# Patient Record
Sex: FEMALE | Race: BLACK OR AFRICAN AMERICAN | NOT HISPANIC OR LATINO | Employment: FULL TIME | URBAN - METROPOLITAN AREA
[De-identification: names, ages, dates, MRNs, and addresses within clinical notes are randomized per-mention and may not be internally consistent; named-entity substitution may affect disease eponyms.]

---

## 2017-01-06 DIAGNOSIS — Z11.59 SPECIAL SCREENING EXAMINATION FOR UNSPECIFIED VIRAL DISEASE: ICD-10-CM

## 2017-01-06 DIAGNOSIS — Z11.3 SCREENING EXAMINATION FOR VENEREAL DISEASE: ICD-10-CM

## 2017-01-06 DIAGNOSIS — Z11.9 SCREENING EXAMINATION FOR INFECTIOUS DISEASE: ICD-10-CM

## 2017-01-06 DIAGNOSIS — Z11.8 SPECIAL SCREENING EXAMINATION FOR UNSPECIFIED CHLAMYDIAL DISEASE: Primary | ICD-10-CM

## 2017-01-06 DIAGNOSIS — Z31.49 OTHER INVESTIGATION AND TESTING FOR PROCREATIVE MANAGEMENT: ICD-10-CM

## 2017-03-11 ENCOUNTER — OFFICE VISIT (OUTPATIENT)
Dept: INTERNAL MEDICINE | Facility: CLINIC | Age: 42
End: 2017-03-11
Payer: COMMERCIAL

## 2017-03-11 ENCOUNTER — TELEPHONE (OUTPATIENT)
Dept: INTERNAL MEDICINE | Facility: CLINIC | Age: 42
End: 2017-03-11

## 2017-03-11 VITALS
HEART RATE: 60 BPM | HEIGHT: 68 IN | SYSTOLIC BLOOD PRESSURE: 110 MMHG | DIASTOLIC BLOOD PRESSURE: 60 MMHG | WEIGHT: 152.75 LBS | RESPIRATION RATE: 10 BRPM | BODY MASS INDEX: 23.15 KG/M2

## 2017-03-11 DIAGNOSIS — H57.89 EYE SWELLING, RIGHT: Primary | ICD-10-CM

## 2017-03-11 PROCEDURE — 99999 PR PBB SHADOW E&M-EST. PATIENT-LVL III: CPT | Mod: PBBFAC,,, | Performed by: INTERNAL MEDICINE

## 2017-03-11 PROCEDURE — 1160F RVW MEDS BY RX/DR IN RCRD: CPT | Mod: S$GLB,,, | Performed by: INTERNAL MEDICINE

## 2017-03-11 PROCEDURE — 99214 OFFICE O/P EST MOD 30 MIN: CPT | Mod: S$GLB,,, | Performed by: INTERNAL MEDICINE

## 2017-03-11 RX ORDER — AMOXICILLIN AND CLAVULANATE POTASSIUM 875; 125 MG/1; MG/1
1 TABLET, FILM COATED ORAL 2 TIMES DAILY
Qty: 14 TABLET | Refills: 0 | Status: SHIPPED | OUTPATIENT
Start: 2017-03-11 | End: 2017-03-18

## 2017-03-11 NOTE — MR AVS SNAPSHOT
[facility]  [address]  [facility] [address]  Phone: [phone]  Fax: [phone]                  Radha Tabor   3/11/2017 1:20 PM   Office Visit    Description:  Female : 1975   Provider:  Jose Alberto Odonnell MD   Department:  [facility]           Reason for Visit     Facial Swelling           Diagnoses this Visit        Comments    Eye swelling, right    -  Primary            To Do List           Future Appointments        Provider Department Dept Phone    3/11/2017 1:20 PM Jose Alberto Odonnell MD [facility] [phone]    3/14/2017 8:20 AM Marry Christie, OD [facility] [phone]      Goals (5 Years of Data)     None       These Medications        Disp Refills Start End    amoxicillin-clavulanate 875-125mg (AUGMENTIN) 875-125 mg per tablet 14 tablet 0 3/11/2017 3/18/2017    Take 1 tablet by mouth 2 (two) times daily. - Oral    [facility] [address] [place] [address] AT [facility] Ph #: [phone]         [facility] On Call     [facility] On Call Nurse Care Line -  Assistance  Registered nurses in the [facility] provide clinical advisement, health education, appointment booking, and other advisory services.  Call for this free service at [phone].             Medications           Message regarding Medications     Verify the changes and/or additions to your medication regime listed below are the same as discussed with your clinician today.  If any of these changes or additions are incorrect, please notify your healthcare provider.        START taking these NEW medications        Refills    amoxicillin-clavulanate 875-125mg (AUGMENTIN) 875-125 mg per tablet 0    Sig: Take 1 tablet by mouth 2 (two) times daily.    Class: Normal    Route: Oral           Verify that the below list of medications is an accurate representation of the medications you are  "currently taking.  If none reported, the list may be blank. If incorrect, please contact your healthcare provider. Carry this list with you in case of emergency.           Current Medications     albuterol 90 mcg/actuation inhaler Inhale 1-2 puffs into the lungs every 6 (six) hours as needed for Wheezing.    amoxicillin-clavulanate 875-125mg (AUGMENTIN) 875-125 mg per tablet Take 1 tablet by mouth 2 (two) times daily.    fluticasone (FLONASE) 50 mcg/actuation nasal spray 2 sprays by Each Nare route once daily.    meloxicam (MOBIC) 15 MG tablet Take 1 tablet (15 mg total) by mouth once daily.           Clinical Reference Information           Your Vitals Were     BP Pulse Resp Height Weight Last Period    110/60 (BP Location: Left arm, Patient Position: Sitting, BP Method: Manual) 60 10 5' 8" (1.727 m) 69.3 kg (152 lb 12.5 oz) 03/07/2017    BMI                23.23 kg/m2          Blood Pressure          Most Recent Value    BP  110/60      Allergies as of 3/11/2017     No Known Allergies      Immunizations Administered on Date of Encounter - 3/11/2017     None      Orders Placed During Today's Visit      Normal Orders This Visit    Ambulatory consult to Optometry       Language Assistance Services     ATTENTION: Language assistance services are available, free of charge. Please call [phone].      ATENCIÓN: Si habla español, tiene a garcía disposición servicios gratuitos de asistencia lingüística. Llame al [phone].     ASHLEY Ý: N?u b?n nói Ti?ng Vi?t, có các d?ch v? h? tr? ngôn ng? mi?n phí dành cho b?n. G?i s? [phone].         [facility] complies with applicable Federal civil rights laws and does not discriminate on the basis of race, color, national origin, age, disability, or sex.        "

## 2017-03-11 NOTE — PROGRESS NOTES
Subjective:       Patient ID: Radha Tabor is a 41 y.o. female.    Chief Complaint: Facial Swelling (right eye)    HPI     41-year-old female here for evaluation of right eye swelling.  She noticed this yesterday.  She had makeup and fake eye lashes on.  She has pain and itching.  This started Friday.  She was rubbing her eye.  She has no trouble seeing.  The border of her eye is itchy.  She has tenderness on the medial lower lid. The swelling is localized to her lower lid.     Review of Systems    Objective:      Physical Exam   Constitutional: She is oriented to person, place, and time. She appears well-developed and well-nourished.   HENT:   Head: Normocephalic and atraumatic.   Mouth/Throat: No oropharyngeal exudate.   Eyes: EOM are normal. Pupils are equal, round, and reactive to light. Right eye exhibits no discharge. Left eye exhibits no discharge. Right conjunctiva is not injected. Right conjunctiva has no hemorrhage. Left conjunctiva is not injected. Left conjunctiva has no hemorrhage. No scleral icterus.   Neck: Normal range of motion. Neck supple. No tracheal deviation present. No thyromegaly present.   Cardiovascular: Normal rate, regular rhythm and normal heart sounds.  Exam reveals no gallop and no friction rub.    No murmur heard.  Pulmonary/Chest: Effort normal and breath sounds normal. No respiratory distress. She has no wheezes. She has no rales. She exhibits no tenderness.   Abdominal: Soft. Bowel sounds are normal. She exhibits no distension and no mass. There is no tenderness. There is no rebound and no guarding.   Musculoskeletal: Normal range of motion. She exhibits no edema or tenderness.   Neurological: She is alert and oriented to person, place, and time.   Skin: Skin is warm and dry. No rash noted. No erythema. No pallor.   Psychiatric: She has a normal mood and affect. Her behavior is normal.   Vitals reviewed.      Assessment:       1. Eye swelling, right        Plan:       1.   Augmentin 875 mg twice a day ×7 days.  Refer to optometry.  Patient advised to go to emergency room if she develops decreasing vision.

## 2017-03-31 ENCOUNTER — TELEPHONE (OUTPATIENT)
Dept: ENDOCRINOLOGY | Facility: CLINIC | Age: 42
End: 2017-03-31

## 2017-03-31 NOTE — TELEPHONE ENCOUNTER
----- Message from Julián Lopez sent at 3/30/2017  8:58 AM CDT -----  Contact: self   Pt is calling in to get an appointment for a follow up for a thyroid condition .     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     Please contact pt for more information [phone].    Thanks

## 2017-07-26 ENCOUNTER — OFFICE VISIT (OUTPATIENT)
Dept: INTERNAL MEDICINE | Facility: CLINIC | Age: 42
End: 2017-07-26
Payer: COMMERCIAL

## 2017-07-26 VITALS
WEIGHT: 147.69 LBS | TEMPERATURE: 98 F | BODY MASS INDEX: 22.38 KG/M2 | HEIGHT: 68 IN | HEART RATE: 68 BPM | SYSTOLIC BLOOD PRESSURE: 114 MMHG | DIASTOLIC BLOOD PRESSURE: 65 MMHG

## 2017-07-26 DIAGNOSIS — R10.9 ABDOMINAL PAIN, UNSPECIFIED LOCATION: Primary | ICD-10-CM

## 2017-07-26 LAB
BILIRUB UR QL STRIP: NEGATIVE
CLARITY UR REFRACT.AUTO: CLEAR
COLOR UR AUTO: YELLOW
GLUCOSE UR QL STRIP: NEGATIVE
HGB UR QL STRIP: NEGATIVE
KETONES UR QL STRIP: NEGATIVE
LEUKOCYTE ESTERASE UR QL STRIP: NEGATIVE
NITRITE UR QL STRIP: NEGATIVE
PH UR STRIP: 7 [PH] (ref 5–8)
PROT UR QL STRIP: NEGATIVE
SP GR UR STRIP: 1.01 (ref 1–1.03)
URN SPEC COLLECT METH UR: NORMAL
UROBILINOGEN UR STRIP-ACNC: NEGATIVE EU/DL

## 2017-07-26 PROCEDURE — 81003 URINALYSIS AUTO W/O SCOPE: CPT

## 2017-07-26 PROCEDURE — 99213 OFFICE O/P EST LOW 20 MIN: CPT | Mod: S$GLB,,, | Performed by: INTERNAL MEDICINE

## 2017-07-26 PROCEDURE — 99999 PR PBB SHADOW E&M-EST. PATIENT-LVL III: CPT | Mod: PBBFAC,,, | Performed by: INTERNAL MEDICINE

## 2017-07-26 PROCEDURE — 87591 N.GONORRHOEAE DNA AMP PROB: CPT

## 2017-07-26 NOTE — PROGRESS NOTES
"Clinic Note  7/26/2017      Subjective:       Patient ID:  Radha is a 42 y.o. female being seen for an established visit.    Chief Complaint: Abdominal Pain    HPI  Pt si a 43 Yo lady with PMH of endometriosis and infertility who presents to clinic complaining of suprapubic abdominal pain that started at 11:30 am yesterday, she describes it as sharp non radiating no aggravating factors alleviated with walking, denies any fever chills , n, v, her last BM was Monday describes as "pebble like" and denies any blood or melena. Pt states that she is on infertility injection treatment and she is due for retrieval in 5 days. She states that she had some spotting fri-Sunday but has stopped since. She denies any other vaginal secretions, any dysuria, frequency or urgency . She has not tried any medication for this states that drive to clinic was comfortable also and no worsening of it.       Review of Systems   Constitutional: Negative for fever and weight loss.   Gastrointestinal: Positive for abdominal pain. Negative for constipation, diarrhea, melena, nausea and vomiting.   Genitourinary: Negative for dysuria, frequency and hematuria.   Musculoskeletal: Negative for myalgias.   Neurological: Negative for sensory change, focal weakness and headaches.       Past Medical History:   Diagnosis Date    Acute hepatitis C without mention of hepatic coma     Endometriosis, site unspecified     Viral hepatitis C 6/18/2016    Viral hepatitis C 6/18/2016       Family History   Problem Relation Age of Onset    Hypertension Mother     Heart disease Mother      murmur    Hypertension Father     Heart disease Paternal Grandmother     Breast cancer Neg Hx     Colon cancer Neg Hx     Ovarian cancer Neg Hx         reports that she has never smoked. She has never used smokeless tobacco. She reports that she does not drink alcohol or use drugs.    Medication List with Changes/Refills   Current Medications    ALBUTEROL 90 " "MCG/ACTUATION INHALER    Inhale 1-2 puffs into the lungs every 6 (six) hours as needed for Wheezing.    FLUTICASONE (FLONASE) 50 MCG/ACTUATION NASAL SPRAY    2 sprays by Each Nare route once daily.    MELOXICAM (MOBIC) 15 MG TABLET    Take 1 tablet (15 mg total) by mouth once daily.     Review of patient's allergies indicates:  No Known Allergies    Patient Active Problem List   Diagnosis    Dysmenorrhea    Endometriosis of pelvis    Breast mass    Perennial allergic rhinitis    Viral hepatitis C    Primary osteoarthritis of left knee           Objective:      /65   Pulse 68   Temp 98.3 °F (36.8 °C)   Ht 5' 8" (1.727 m)   Wt 67 kg (147 lb 11.2 oz)   BMI 22.46 kg/m²   Estimated body mass index is 22.46 kg/m² as calculated from the following:    Height as of this encounter: 5' 8" (1.727 m).    Weight as of this encounter: 67 kg (147 lb 11.2 oz).  Physical Exam   Constitutional: She is oriented to person, place, and time and well-developed, well-nourished, and in no distress. No distress.   HENT:   Head: Normocephalic and atraumatic.   Mouth/Throat: Oropharynx is clear and moist.   Eyes: Right eye exhibits no discharge. Left eye exhibits no discharge. No scleral icterus.   Neck: Neck supple. No JVD present.   Cardiovascular: Normal rate, regular rhythm, normal heart sounds and intact distal pulses.  Exam reveals no friction rub.    No murmur heard.  Pulmonary/Chest: Effort normal and breath sounds normal. No respiratory distress. She has no wheezes. She has no rales.   Abdominal: Soft. Bowel sounds are normal. She exhibits no distension. There is no rebound and no guarding.   Suprapubic tenderness, no rebound or gaurding   Musculoskeletal: She exhibits no edema, tenderness or deformity.   Neurological: She is alert and oriented to person, place, and time.   Skin: Skin is warm and dry. No rash noted. She is not diaphoretic.   Psychiatric: Mood, affect and judgment normal.         Assessment and Plan: "         Radha was seen today for abdominal pain.    Diagnoses and all orders for this visit:    Abdominal pain, unspecified location  -     URINALYSIS was negative  -     C. trachomatis/N. gonorrhoeae by AMP DNA Urine  -abdominal pain likely 2/2 cyst rupture  - we recommend a vaginal Us which pt is already scheduled for tomorrow as part of her fertility treatment  -pt educated on warning signs and indications for going to ED        No Follow-up on file.    Other Orders Placed This Visit:  Orders Placed This Encounter   Procedures    C. trachomatis/N. gonorrhoeae by AMP DNA Urine    URINALYSIS         Nu Kauffman      Case reviewed and discussed with Dr. Kelly

## 2017-07-26 NOTE — PATIENT INSTRUCTIONS
if you started to develop fever (more than 100.5 F), vomiting, palpitations    ( heart rate more than 100) and low blood pressure (less than 100/60) with worsening abdominal pain please present to ED

## 2017-07-27 ENCOUNTER — TELEPHONE (OUTPATIENT)
Dept: INTERNAL MEDICINE | Facility: CLINIC | Age: 42
End: 2017-07-27

## 2017-07-27 LAB
C TRACH DNA SPEC QL NAA+PROBE: NOT DETECTED
N GONORRHOEA DNA SPEC QL NAA+PROBE: NOT DETECTED

## 2017-07-27 NOTE — TELEPHONE ENCOUNTER
attemoted to call pt and inform her of her negative gonorrhea and chlamydia test result. Unable to reach pt

## 2017-11-13 ENCOUNTER — OFFICE VISIT (OUTPATIENT)
Dept: INTERNAL MEDICINE | Facility: CLINIC | Age: 42
End: 2017-11-13
Payer: COMMERCIAL

## 2017-11-13 VITALS
HEART RATE: 76 BPM | WEIGHT: 152.13 LBS | DIASTOLIC BLOOD PRESSURE: 54 MMHG | BODY MASS INDEX: 23.05 KG/M2 | TEMPERATURE: 99 F | OXYGEN SATURATION: 99 % | HEIGHT: 68 IN | SYSTOLIC BLOOD PRESSURE: 92 MMHG

## 2017-11-13 DIAGNOSIS — H60.501 ACUTE OTITIS EXTERNA OF RIGHT EAR, UNSPECIFIED TYPE: Primary | ICD-10-CM

## 2017-11-13 DIAGNOSIS — J30.89 PERENNIAL ALLERGIC RHINITIS: ICD-10-CM

## 2017-11-13 PROCEDURE — 99214 OFFICE O/P EST MOD 30 MIN: CPT | Mod: S$GLB,,, | Performed by: NURSE PRACTITIONER

## 2017-11-13 PROCEDURE — 99999 PR PBB SHADOW E&M-EST. PATIENT-LVL III: CPT | Mod: PBBFAC,,, | Performed by: NURSE PRACTITIONER

## 2017-11-13 RX ORDER — OFLOXACIN 3 MG/ML
5 SOLUTION AURICULAR (OTIC) DAILY
Qty: 5 ML | Refills: 0 | Status: SHIPPED | OUTPATIENT
Start: 2017-11-13 | End: 2021-09-15

## 2017-11-13 RX ORDER — FLUTICASONE PROPIONATE 50 MCG
2 SPRAY, SUSPENSION (ML) NASAL DAILY
Qty: 16 G | Refills: 6 | Status: SHIPPED | OUTPATIENT
Start: 2017-11-13 | End: 2019-09-04

## 2017-11-13 NOTE — PROGRESS NOTES
"Subjective:       Patient ID: Radha Tabor is a 42 y.o. female.    Chief Complaint: Otalgia    Pt here c/o right ear pain started Tue or Wed.  Pt states that she always has a runny noise  Only using her Flonase sporadically        Otalgia    Associated symptoms include rhinorrhea. Pertinent negatives include no abdominal pain, coughing, diarrhea, headaches, neck pain, rash, sore throat or vomiting.     Past Medical History:   Diagnosis Date    Acute hepatitis C without mention of hepatic coma(070.51)     Endometriosis, site unspecified     Viral hepatitis C 6/18/2016    Viral hepatitis C 6/18/2016     Past Surgical History:   Procedure Laterality Date    Dx scope  10/16/2012    TDP hysteroscope    HYSTEROSCOPY      endometriosis    LASER LAPAROSCOPY      NASAL SEPTUM SURGERY       Social History     Social History Narrative     at [place], , 2 step kids.     Family History   Problem Relation Age of Onset    Hypertension Mother     Heart disease Mother      murmur    Hypertension Father     Heart disease Paternal Grandmother     Breast cancer Neg Hx     Colon cancer Neg Hx     Ovarian cancer Neg Hx      Vitals:    11/13/17 1502   BP: (!) 92/54   Pulse: 76   Temp: 98.8 °F (37.1 °C)   SpO2: 99%   Weight: 69 kg (152 lb 1.9 oz)   Height: 5' 8" (1.727 m)   PainSc:   4   PainLoc: Ear     Outpatient Encounter Prescriptions as of 11/13/2017   Medication Sig Dispense Refill    fluticasone (FLONASE) 50 mcg/actuation nasal spray 2 sprays by Each Nare route once daily. 16 g 6    meloxicam (MOBIC) 15 MG tablet Take 1 tablet (15 mg total) by mouth once daily. 30 tablet 2    [DISCONTINUED] fluticasone (FLONASE) 50 mcg/actuation nasal spray 2 sprays by Each Nare route once daily. 16 g 6    albuterol 90 mcg/actuation inhaler Inhale 1-2 puffs into the lungs every 6 (six) hours as needed for Wheezing. 1 Inhaler 0    ofloxacin (FLOXIN) 0.3 % otic solution Place 5 drops into the left ear once " "daily. 5 mL 0     No facility-administered encounter medications on file as of 11/13/2017.      Wt Readings from Last 3 Encounters:   11/13/17 69 kg (152 lb 1.9 oz)   07/26/17 67 kg (147 lb 11.2 oz)   03/11/17 69.3 kg (152 lb 12.5 oz)     Last 3 sets of Vitals    Vitals - 1 value per visit 3/11/2017 7/26/2017 11/13/2017   SYSTOLIC 110 114 92   DIASTOLIC 60 65 54   PULSE 60 68 76   TEMPERATURE - 98.3 98.8   RESPIRATIONS 10 - -   SPO2 - - 99   Weight (lb) 152.78 147.7 152.12   Weight (kg) 69.3 66.996 69   HEIGHT 5' 8" 5' 8" 5' 8"   BODY MASS INDEX 23.23 22.46 23.13   VISIT REPORT - - -   Pain Score  0 5 4   Some recent data might be hidden     No results found for: CBC  Review of Systems   Constitutional: Negative for chills and fever.   HENT: Positive for ear pain, postnasal drip and rhinorrhea. Negative for congestion, sore throat, tinnitus, trouble swallowing and voice change.    Eyes: Negative for discharge.   Respiratory: Negative for cough and shortness of breath.    Cardiovascular: Negative for chest pain and palpitations.   Gastrointestinal: Negative for abdominal pain, diarrhea, nausea and vomiting.   Musculoskeletal: Negative for arthralgias, myalgias, neck pain and neck stiffness.   Skin: Negative for rash.   Neurological: Negative for dizziness, facial asymmetry and headaches.   Hematological: Positive for adenopathy.   Psychiatric/Behavioral: Negative for sleep disturbance.       Objective:      Physical Exam   Constitutional: She is oriented to person, place, and time. She appears well-developed and well-nourished. No distress.   HENT:   Head: Normocephalic and atraumatic.   Right Ear: Hearing, tympanic membrane and external ear normal. There is drainage and tenderness. Tympanic membrane is not perforated.   Left Ear: Hearing, tympanic membrane, external ear and ear canal normal.   Nose: Mucosal edema and rhinorrhea present.   Mouth/Throat: Uvula is midline, oropharynx is clear and moist and mucous " membranes are normal.   Cloudy PND noted     Eyes: Conjunctivae are normal. Pupils are equal, round, and reactive to light. Right eye exhibits no discharge. Left eye exhibits no discharge.   Neck: Normal range of motion.   Cardiovascular: Normal rate, regular rhythm, normal heart sounds and intact distal pulses.    Pulmonary/Chest: Effort normal and breath sounds normal. No respiratory distress.   Abdominal: Soft.   Lymphadenopathy:        Head (right side): Submandibular adenopathy present.     She has cervical adenopathy.   Node x 1     Neurological: She is alert and oriented to person, place, and time.   Skin: Skin is warm and dry. Capillary refill takes less than 2 seconds. No rash noted. She is not diaphoretic. No erythema.   Psychiatric: She has a normal mood and affect. Her behavior is normal. Judgment and thought content normal.       Assessment:       1. Acute otitis externa of right ear, unspecified type    2. Perennial allergic rhinitis        Plan:       Recommended being consistent with Flonase  OTC Claritin or similar  Discussed keeping water out of ear  Radha was seen today for otalgia.    Diagnoses and all orders for this visit:    Acute otitis externa of right ear, unspecified type  -     ofloxacin (FLOXIN) 0.3 % otic solution; Place 5 drops into the left ear once daily.    Perennial allergic rhinitis  -     fluticasone (FLONASE) 50 mcg/actuation nasal spray; 2 sprays by Each Nare route once daily.      Patient Instructions   Ear drops once a day    Flonase twice a day for 7 days then once a day for 6 weeks    Take OTC Claritin, Zyrtec or Allegra daily.    No water in ears for 10 days

## 2017-11-13 NOTE — PATIENT INSTRUCTIONS
Ear drops once a day    Flonase twice a day for 7 days then once a day for 6 weeks    Take OTC Claritin, Zyrtec or Allegra daily.    No water in ears for 10 days

## 2018-11-25 ENCOUNTER — OFFICE VISIT (OUTPATIENT)
Dept: URGENT CARE | Facility: CLINIC | Age: 43
End: 2018-11-25
Payer: COMMERCIAL

## 2018-11-25 VITALS
BODY MASS INDEX: 22.73 KG/M2 | TEMPERATURE: 98 F | OXYGEN SATURATION: 98 % | DIASTOLIC BLOOD PRESSURE: 67 MMHG | HEIGHT: 68 IN | SYSTOLIC BLOOD PRESSURE: 122 MMHG | HEART RATE: 72 BPM | WEIGHT: 150 LBS | RESPIRATION RATE: 18 BRPM

## 2018-11-25 DIAGNOSIS — L30.9 DERMATITIS: Primary | ICD-10-CM

## 2018-11-25 PROCEDURE — 99214 OFFICE O/P EST MOD 30 MIN: CPT | Mod: S$GLB,,, | Performed by: PHYSICIAN ASSISTANT

## 2018-11-25 PROCEDURE — 3008F BODY MASS INDEX DOCD: CPT | Mod: CPTII,S$GLB,, | Performed by: PHYSICIAN ASSISTANT

## 2018-11-25 RX ORDER — METHYLPREDNISOLONE 4 MG/1
4 TABLET ORAL SEE ADMIN INSTRUCTIONS
Qty: 1 PACKAGE | Refills: 0 | Status: SHIPPED | OUTPATIENT
Start: 2018-11-25 | End: 2018-12-01

## 2018-11-25 RX ORDER — MUPIROCIN 20 MG/G
OINTMENT TOPICAL
Qty: 22 G | Refills: 1 | Status: SHIPPED | OUTPATIENT
Start: 2018-11-25 | End: 2021-09-15

## 2018-11-25 NOTE — PATIENT INSTRUCTIONS
Nonspecific Dermatitis  Dermatitis is a skin rash caused by something that touches the skin and makes it irritated and inflamed.  Your skin may be red, swollen, dry, and may be cracked. Blisters may form and ooze. The rash will itch.  Dermatitis can form on the face and neck, backs of hands, forearms, genitals, and lower legs. Dermatitis is not passed from person to person.  Talk with your health care provider about what may have caused the rash. Common things that cause skin allergies are metal in jewelry, plants like poison ivy or poison oak, and certain skin care products. You will need to avoid the source of your rash in the future to prevent it from coming back. In some cases, the cause of the dermatitis may not be found.  Treatment is done to relieve itching and prevent the rash from coming back. The rash should go away in a few days to a few weeks.  Home care  The health care provider may prescribe medications to relieve swelling and itching. Follow all instructions when using these medications.  · Avoid anything that heats up your skin, such as hot showers or baths, or direct sunlight. This can make itching worse.  · Stay away from whatever you think caused the rash.  · Bathe in warm, not hot, water. Apply a moisturizing lotion after bathing to prevent dry skin.  · Avoid skin irritants such as wool or silk clothing, grease, oils, harsh soaps, and detergents.  · Apply cold compresses to soothe your sores to help relieve your symptoms. Do this for 30 minutes 3 to 4 times a day. You can make a cold compress by soaking a cloth in cold water. Squeeze out excess water. You can add colloidal oatmeal to the water to help reduce itching. For severe itching in a small area, apply an ice pack wrapped in a thin towel. Do this for 20 minutes 3 to 4 times a day.  · You can also help relieve large areas of itching by taking a lukewarm bath with colloidal oatmeal added to the water.  · Use hydrocortisone cream for redness  and irritation, unless another medicine was prescribed. You can also use benzocaine anesthetic cream or spray.  · Use oral diphenhydramine to help reduce itching. This is an antihistamine you can buy at drug and grocery stores. It can make you sleepy, so use lower doses during the daytime. Or you can use loratadine. This is an antihistamine that will not make you sleepy. Dont use diphenhydramine if you have glaucoma or have trouble urinating because of an enlarged prostate.  · Wash your hands or use an antibacterial gel often to prevent the spread of the rash.  Follow-up care  Follow up with your health care provider. Make an appointment with your health care provider if your symptoms do not get better in the next 1 to 2 weeks.  When to seek medical advice  Call your health care provider right away if any of these occur:  · Spreading of the rash to other parts of your body  · Severe swelling of your face, eyelids, mouth, throat or tongue  · Trouble urinating due to swelling in the genital area  · Fever of 100.4°F (38°C) or higher  · Redness or swelling that gets worse  · Pain that gets worse  · Foul-smelling fluid leaking from the skin  · Yellow-brown crusts on the open blisters  · Joint pain   Date Last Reviewed: 7/23/2014  © 3964-3819 [employer]. [address] [place] [address], [place] [address]. All rights reserved. This information is not intended as a substitute for professional medical care. Always follow your healthcare professional's instructions.      Please follow up with your Primary care provider within 2-5 days if your signs and symptoms have not resolved or worsen.     If your condition worsens or fails to improve we recommend that you receive another evaluation at the emergency room immediately or contact your primary medical clinic to discuss your concerns.   You must understand that you have received an Urgent Care treatment only and that you may be released before all of your medical  problems are known or treated. You, the patient, will arrange for follow up care as instructed.     RED FLAGS/WARNING SYMPTOMS DISCUSSED WITH PATIENT THAT WOULD WARRANT EMERGENT MEDICAL ATTENTION. PATIENT VERBALIZED UNDERSTANDING.        No

## 2018-11-25 NOTE — PROGRESS NOTES
"Subjective:       Patient ID: Radha Tabor is a 43 y.o. female.    Vitals:  height is 5' 8" (1.727 m) and weight is 68 kg (150 lb). Her oral temperature is 98.4 °F (36.9 °C). Her blood pressure is 122/67 and her pulse is 72. Her respiration is 18 and oxygen saturation is 98%.     Chief Complaint: Rash    Pt states she has bumps on the back of her leg since friday. Pt states symptoms have worsened. Pt reports itching and swelling. Pt is unsure the source of the bumps.       Rash   This is a new problem. The current episode started in the past 7 days. The problem has been gradually worsening since onset. The affected locations include the left upper leg. The rash is characterized by redness, swelling and itchiness. It is unknown if there was an exposure to a precipitant. Pertinent negatives include no congestion, cough, diarrhea, fatigue, fever, shortness of breath, sore throat or vomiting.       Constitution: Negative for chills, fatigue and fever.   HENT: Negative for congestion and sore throat.    Neck: Negative for painful lymph nodes.   Cardiovascular: Negative for chest pain and leg swelling.   Eyes: Negative for double vision and blurred vision.   Respiratory: Negative for cough and shortness of breath.    Gastrointestinal: Negative for nausea, vomiting and diarrhea.   Genitourinary: Negative for dysuria, frequency, urgency and history of kidney stones.   Musculoskeletal: Negative for joint pain, joint swelling, muscle cramps and muscle ache.   Skin: Positive for rash. Negative for color change, pale, erythema and bruising.   Allergic/Immunologic: Positive for itching. Negative for seasonal allergies.   Neurological: Negative for dizziness, history of vertigo, light-headedness, passing out and headaches.   Hematologic/Lymphatic: Negative for swollen lymph nodes.   Psychiatric/Behavioral: Negative for nervous/anxious, sleep disturbance and depression. The patient is not nervous/anxious.        Objective:    "   Physical Exam   Constitutional: She is oriented to person, place, and time. She appears well-developed and well-nourished.   HENT:   Head: Normocephalic and atraumatic. Head is without abrasion, without contusion and without laceration.   Right Ear: External ear normal.   Left Ear: External ear normal.   Nose: Nose normal.   Mouth/Throat: Oropharynx is clear and moist.   Eyes: Conjunctivae, EOM and lids are normal. Pupils are equal, round, and reactive to light.   Neck: Trachea normal, full passive range of motion without pain and phonation normal. Neck supple.   Cardiovascular: Normal rate, regular rhythm and normal heart sounds.   Pulmonary/Chest: Effort normal and breath sounds normal. No stridor. No respiratory distress.   Musculoskeletal: Normal range of motion.   Neurological: She is alert and oriented to person, place, and time.   Skin: Skin is warm, dry and intact. Capillary refill takes less than 2 seconds. Rash noted. No abrasion, no bruising, no burn, no ecchymosis, no laceration, no lesion and no purpura noted. Rash is pustular. Rash is not nodular and not urticarial. No erythema.        Psychiatric: She has a normal mood and affect. Her speech is normal and behavior is normal. Judgment and thought content normal. Cognition and memory are normal.   Nursing note and vitals reviewed.      Assessment:       1. Dermatitis        Plan:         Dermatitis  -     methylPREDNISolone (MEDROL DOSEPACK) 4 mg tablet; Take 1 tablet (4 mg total) by mouth As instructed (Take as directed). Take as directed  Dispense: 1 Package; Refill: 0  -     mupirocin (BACTROBAN) 2 % ointment; Apply to affected area 3 times daily  Dispense: 22 g; Refill: 1          Nonspecific Dermatitis  Dermatitis is a skin rash caused by something that touches the skin and makes it irritated and inflamed.  Your skin may be red, swollen, dry, and may be cracked. Blisters may form and ooze. The rash will itch.  Dermatitis can form on the face and  neck, backs of hands, forearms, genitals, and lower legs. Dermatitis is not passed from person to person.  Talk with your health care provider about what may have caused the rash. Common things that cause skin allergies are metal in jewelry, plants like poison ivy or poison oak, and certain skin care products. You will need to avoid the source of your rash in the future to prevent it from coming back. In some cases, the cause of the dermatitis may not be found.  Treatment is done to relieve itching and prevent the rash from coming back. The rash should go away in a few days to a few weeks.  Home care  The health care provider may prescribe medications to relieve swelling and itching. Follow all instructions when using these medications.  · Avoid anything that heats up your skin, such as hot showers or baths, or direct sunlight. This can make itching worse.  · Stay away from whatever you think caused the rash.  · Bathe in warm, not hot, water. Apply a moisturizing lotion after bathing to prevent dry skin.  · Avoid skin irritants such as wool or silk clothing, grease, oils, harsh soaps, and detergents.  · Apply cold compresses to soothe your sores to help relieve your symptoms. Do this for 30 minutes 3 to 4 times a day. You can make a cold compress by soaking a cloth in cold water. Squeeze out excess water. You can add colloidal oatmeal to the water to help reduce itching. For severe itching in a small area, apply an ice pack wrapped in a thin towel. Do this for 20 minutes 3 to 4 times a day.  · You can also help relieve large areas of itching by taking a lukewarm bath with colloidal oatmeal added to the water.  · Use hydrocortisone cream for redness and irritation, unless another medicine was prescribed. You can also use benzocaine anesthetic cream or spray.  · Use oral diphenhydramine to help reduce itching. This is an antihistamine you can buy at drug and grocery stores. It can make you sleepy, so use lower doses  during the daytime. Or you can use loratadine. This is an antihistamine that will not make you sleepy. Dont use diphenhydramine if you have glaucoma or have trouble urinating because of an enlarged prostate.  · Wash your hands or use an antibacterial gel often to prevent the spread of the rash.  Follow-up care  Follow up with your health care provider. Make an appointment with your health care provider if your symptoms do not get better in the next 1 to 2 weeks.  When to seek medical advice  Call your health care provider right away if any of these occur:  · Spreading of the rash to other parts of your body  · Severe swelling of your face, eyelids, mouth, throat or tongue  · Trouble urinating due to swelling in the genital area  · Fever of 100.4°F (38°C) or higher  · Redness or swelling that gets worse  · Pain that gets worse  · Foul-smelling fluid leaking from the skin  · Yellow-brown crusts on the open blisters  · Joint pain   Date Last Reviewed: 7/23/2014 © 2000-2017 [employer]. [address] [place] [address]. All rights reserved. This information is not intended as a substitute for professional medical care. Always follow your healthcare professional's instructions.      Please follow up with your Primary care provider within 2-5 days if your signs and symptoms have not resolved or worsen.     If your condition worsens or fails to improve we recommend that you receive another evaluation at the emergency room immediately or contact your primary medical clinic to discuss your concerns.   You must understand that you have received an Urgent Care treatment only and that you may be released before all of your medical problems are known or treated. You, the patient, will arrange for follow up care as instructed.     RED FLAGS/WARNING SYMPTOMS DISCUSSED WITH PATIENT THAT WOULD WARRANT EMERGENT MEDICAL ATTENTION. PATIENT VERBALIZED UNDERSTANDING.

## 2018-12-05 ENCOUNTER — OFFICE VISIT (OUTPATIENT)
Dept: INTERNAL MEDICINE | Facility: CLINIC | Age: 43
End: 2018-12-05
Payer: COMMERCIAL

## 2018-12-05 VITALS
HEIGHT: 68 IN | OXYGEN SATURATION: 98 % | DIASTOLIC BLOOD PRESSURE: 68 MMHG | SYSTOLIC BLOOD PRESSURE: 110 MMHG | HEART RATE: 84 BPM | TEMPERATURE: 99 F | BODY MASS INDEX: 23.45 KG/M2 | WEIGHT: 154.75 LBS

## 2018-12-05 DIAGNOSIS — J02.9 SORE THROAT: Primary | ICD-10-CM

## 2018-12-05 DIAGNOSIS — J32.9 OTHER SINUSITIS, UNSPECIFIED CHRONICITY: ICD-10-CM

## 2018-12-05 LAB
CTP QC/QA: YES
S PYO RRNA THROAT QL PROBE: NEGATIVE

## 2018-12-05 PROCEDURE — 3008F BODY MASS INDEX DOCD: CPT | Mod: CPTII,S$GLB,, | Performed by: NURSE PRACTITIONER

## 2018-12-05 PROCEDURE — 99999 PR PBB SHADOW E&M-EST. PATIENT-LVL IV: CPT | Mod: PBBFAC,,, | Performed by: NURSE PRACTITIONER

## 2018-12-05 PROCEDURE — 96372 THER/PROPH/DIAG INJ SC/IM: CPT | Mod: 59,S$GLB,, | Performed by: NURSE PRACTITIONER

## 2018-12-05 PROCEDURE — 99214 OFFICE O/P EST MOD 30 MIN: CPT | Mod: 25,S$GLB,, | Performed by: NURSE PRACTITIONER

## 2018-12-05 PROCEDURE — 87081 CULTURE SCREEN ONLY: CPT

## 2018-12-05 PROCEDURE — 87880 STREP A ASSAY W/OPTIC: CPT | Mod: QW,S$GLB,, | Performed by: NURSE PRACTITIONER

## 2018-12-05 RX ORDER — BETAMETHASONE SODIUM PHOSPHATE AND BETAMETHASONE ACETATE 3; 3 MG/ML; MG/ML
6 INJECTION, SUSPENSION INTRA-ARTICULAR; INTRALESIONAL; INTRAMUSCULAR; SOFT TISSUE
Status: COMPLETED | OUTPATIENT
Start: 2018-12-05 | End: 2018-12-05

## 2018-12-05 RX ORDER — AMOXICILLIN AND CLAVULANATE POTASSIUM 875; 125 MG/1; MG/1
1 TABLET, FILM COATED ORAL EVERY 12 HOURS
Qty: 20 TABLET | Refills: 0 | Status: SHIPPED | OUTPATIENT
Start: 2018-12-05 | End: 2018-12-15

## 2018-12-05 RX ADMIN — BETAMETHASONE SODIUM PHOSPHATE AND BETAMETHASONE ACETATE 6 MG: 3; 3 INJECTION, SUSPENSION INTRA-ARTICULAR; INTRALESIONAL; INTRAMUSCULAR; SOFT TISSUE at 03:12

## 2018-12-05 NOTE — PROGRESS NOTES
Subjective:       Patient ID: Radha Tabor is a 43 y.o. female.    Chief Complaint: Sinusitis (sore throat, earache, cough)    HPI:  42 yo female that presents to clinic today with complaint of sore throat, cough and congestion.    States that her symptoms started about one day ago.  Reports that she is having thick brown mucus production.  States that she is also having increased head pressure.  Denies any fever, chest pain, SOB, n/v or dizziness.  States that appetite and energy level are down.    States that she took a dose of theraflu this morning but states minimal relief.    Review of Systems   Constitutional: Positive for appetite change and fatigue. Negative for activity change and fever.   HENT: Positive for congestion, postnasal drip, rhinorrhea, sinus pressure, sinus pain and sore throat. Negative for ear pain.    Eyes: Negative for photophobia and visual disturbance.   Respiratory: Positive for cough. Negative for apnea, shortness of breath and wheezing.    Cardiovascular: Negative for chest pain, palpitations and leg swelling.   Gastrointestinal: Negative for abdominal distention, abdominal pain, constipation, diarrhea, nausea and vomiting.   Musculoskeletal: Negative for arthralgias, back pain, myalgias, neck pain and neck stiffness.   Skin: Negative for color change and rash.   Neurological: Negative for dizziness, light-headedness, numbness and headaches.   Psychiatric/Behavioral: Negative for behavioral problems.       Objective:      Physical Exam   Constitutional: She is oriented to person, place, and time. She appears well-developed and well-nourished. She appears distressed.   HENT:   Head: Normocephalic and atraumatic.   Right Ear: External ear normal.   Left Ear: External ear normal.   Nose: Rhinorrhea present. Right sinus exhibits maxillary sinus tenderness and frontal sinus tenderness. Left sinus exhibits maxillary sinus tenderness and frontal sinus tenderness.   + post nasal drip and  moderate erythema to oropharynx.   Eyes: Conjunctivae and EOM are normal. Pupils are equal, round, and reactive to light.   Neck: Normal range of motion. Neck supple. No thyromegaly present.   Cardiovascular: Normal rate, regular rhythm, normal heart sounds and intact distal pulses.   No murmur heard.  Pulmonary/Chest: Effort normal and breath sounds normal. No stridor. No respiratory distress. She has no wheezes. She has no rales.   Abdominal: Soft. Bowel sounds are normal. She exhibits no distension and no mass. There is no tenderness.   Lymphadenopathy:     She has no cervical adenopathy.   Neurological: She is alert and oriented to person, place, and time. No cranial nerve deficit.   Skin: Skin is warm and dry. No erythema.   Psychiatric: Her behavior is normal.       Assessment:       1. Sore throat    2. Other sinusitis, unspecified chronicity        Plan:     1.  Sore throat  -Rapid strep screen in clinic is negative.  -Will send off for culture.  -Encouraged use of warm salt water gargles to help with sore throat relief.    2.  Sinusitis  -Given brown mucus production, will give prescription for Augmentin po bid x 10 days.  -Will give 6mg IM of betamethasone in clinic to help with symptom relief.  -Encouraged to increase water intake and get plenty of rest.  -Can take advil or tylenol for any fever, aches or pains.

## 2018-12-07 LAB — BACTERIA THROAT CULT: NORMAL

## 2019-01-20 ENCOUNTER — PATIENT MESSAGE (OUTPATIENT)
Dept: INTERNAL MEDICINE | Facility: CLINIC | Age: 44
End: 2019-01-20

## 2019-01-20 DIAGNOSIS — Z12.31 ENCOUNTER FOR SCREENING MAMMOGRAM FOR MALIGNANT NEOPLASM OF BREAST: Primary | ICD-10-CM

## 2019-01-21 ENCOUNTER — PATIENT MESSAGE (OUTPATIENT)
Dept: INTERNAL MEDICINE | Facility: CLINIC | Age: 44
End: 2019-01-21

## 2019-01-22 NOTE — TELEPHONE ENCOUNTER
Hi, please contact the patient to assist in scheduling    Orders Placed This Encounter    Mammo Digital Screening Bilateral With CAD     And a a checkup with me.    Thank you, Landon Felix

## 2019-08-03 ENCOUNTER — HOSPITAL ENCOUNTER (OUTPATIENT)
Dept: RADIOLOGY | Facility: HOSPITAL | Age: 44
Discharge: HOME | End: 2019-08-03
Attending: INTERNAL MEDICINE
Payer: COMMERCIAL

## 2019-08-03 VITALS — HEIGHT: 68 IN | WEIGHT: 154 LBS | BODY MASS INDEX: 23.34 KG/M2

## 2019-08-03 DIAGNOSIS — Z12.31 ENCOUNTER FOR SCREENING MAMMOGRAM FOR MALIGNANT NEOPLASM OF BREAST: ICD-10-CM

## 2019-08-03 PROCEDURE — 77067 SCR MAMMO BI INCL CAD: CPT | Mod: 26,,, | Performed by: RADIOLOGY

## 2019-08-03 PROCEDURE — 77067 SCR MAMMO BI INCL CAD: CPT | Mod: TC

## 2019-08-03 PROCEDURE — 77067 MAMMO DIGITAL SCREENING BILAT WITH TOMOSYNTHESIS_CAD: ICD-10-PCS | Mod: 26,,, | Performed by: RADIOLOGY

## 2019-08-03 PROCEDURE — 77063 BREAST TOMOSYNTHESIS BI: CPT | Mod: 26,,, | Performed by: RADIOLOGY

## 2019-08-03 PROCEDURE — 77063 MAMMO DIGITAL SCREENING BILAT WITH TOMOSYNTHESIS_CAD: ICD-10-PCS | Mod: 26,,, | Performed by: RADIOLOGY

## 2019-09-04 ENCOUNTER — OFFICE VISIT (OUTPATIENT)
Dept: INTERNAL MEDICINE | Facility: CLINIC | Age: 44
End: 2019-09-04
Payer: COMMERCIAL

## 2019-09-04 VITALS
HEIGHT: 68 IN | OXYGEN SATURATION: 98 % | SYSTOLIC BLOOD PRESSURE: 116 MMHG | DIASTOLIC BLOOD PRESSURE: 60 MMHG | HEART RATE: 82 BPM | BODY MASS INDEX: 24.63 KG/M2 | WEIGHT: 162.5 LBS

## 2019-09-04 DIAGNOSIS — J30.89 PERENNIAL ALLERGIC RHINITIS: ICD-10-CM

## 2019-09-04 PROCEDURE — 99999 PR PBB SHADOW E&M-EST. PATIENT-LVL III: CPT | Mod: PBBFAC,,, | Performed by: INTERNAL MEDICINE

## 2019-09-04 PROCEDURE — 3008F BODY MASS INDEX DOCD: CPT | Mod: CPTII,S$GLB,, | Performed by: INTERNAL MEDICINE

## 2019-09-04 PROCEDURE — 3008F PR BODY MASS INDEX (BMI) DOCUMENTED: ICD-10-PCS | Mod: CPTII,S$GLB,, | Performed by: INTERNAL MEDICINE

## 2019-09-04 PROCEDURE — 99213 OFFICE O/P EST LOW 20 MIN: CPT | Mod: S$GLB,,, | Performed by: INTERNAL MEDICINE

## 2019-09-04 PROCEDURE — 99213 PR OFFICE/OUTPT VISIT, EST, LEVL III, 20-29 MIN: ICD-10-PCS | Mod: S$GLB,,, | Performed by: INTERNAL MEDICINE

## 2019-09-04 PROCEDURE — 99999 PR PBB SHADOW E&M-EST. PATIENT-LVL III: ICD-10-PCS | Mod: PBBFAC,,, | Performed by: INTERNAL MEDICINE

## 2019-09-04 RX ORDER — FLUTICASONE PROPIONATE 50 MCG
2 SPRAY, SUSPENSION (ML) NASAL DAILY
Qty: 16 G | Refills: 6 | Status: SHIPPED | OUTPATIENT
Start: 2019-09-04 | End: 2020-10-01 | Stop reason: SDUPTHER

## 2019-09-04 RX ORDER — MINERAL OIL
180 ENEMA (ML) RECTAL DAILY
Qty: 30 TABLET | Refills: 6 | Status: SHIPPED | OUTPATIENT
Start: 2019-09-04 | End: 2021-09-15

## 2019-09-04 NOTE — PROGRESS NOTES
Subjective:       Patient ID: Radha Tabor is a 44 y.o. female.    Chief Complaint: Sinusitis (thick phlegm in the back of the throat, either runny or nasal congestion (never at the same time) did take Sudafed and Benadryl, had a really bad day on labor day with the sinus infection, ears clogged/popping once a month )    Here for urgent care --- nasal congestion, sneezing and hot feeling, some hives as well. Has thick phlegm at times, which she gags on on occ. No thick phlegm today. No f/c/ns. No known sick contacts. Has had long term allergies, and thinks she has hay fever. [place] snot recall every having allergy testing. No current f/c/ns.    Review of Systems   Constitutional: Negative for activity change, fatigue and fever.   HENT: Positive for congestion, postnasal drip, rhinorrhea and sneezing. Negative for sinus pain, trouble swallowing and voice change.    Respiratory: Negative for shortness of breath.        Objective:      Physical Exam   Constitutional: She appears well-developed and well-nourished. No distress.   HENT:   Head: Normocephalic and atraumatic.   Mouth/Throat: No oropharyngeal exudate.   TMs clear (L side mildly cerumen obscures view partially), sinuses nontender, nasal mucosa w/o purulence.  OP with post nasal gtt seen   Eyes: Pupils are equal, round, and reactive to light. EOM are normal. No scleral icterus.   Neck: Normal range of motion. Neck supple. No thyromegaly present.   Cardiovascular: Normal rate and regular rhythm.   Pulmonary/Chest: Effort normal and breath sounds normal. No respiratory distress. She has no wheezes. She has no rales.   Lymphadenopathy:     She has no cervical adenopathy.   Skin: She is not diaphoretic.   Psychiatric: She has a normal mood and affect. Her behavior is normal.       Assessment:       1. Perennial allergic rhinitis        Plan:       Radha was seen today for sinusitis.    Diagnoses and all orders for this visit:    Perennial allergic rhinitis  -      fluticasone propionate (FLONASE) 50 mcg/actuation nasal spray; 2 sprays (100 mcg total) by Each Nostril route once daily.  -     fexofenadine (ALLEGRA) 180 MG tablet; Take 1 tablet (180 mg total) by mouth once daily.  Sounds like allergies more than acute bactreial sinusitits.  Explained that if not better in 1-2 weeks, pt should rtc/call PCP, or call if sputum/mucous starts appearing more purulent          Health Maintenance       Date Due Completion Date    TETANUS VACCINE 04/09/1993 ---    Pneumococcal Vaccine (Medium Risk) (1 of 1 - PPSV23) 04/09/1994 ---    Pap Smear with HPV Cotest 05/09/2019 5/9/2016    Influenza Vaccine (1) 09/01/2019 ---    Mammogram 08/03/2021 8/3/2019      rtc for annual    Follow up in about 3 weeks (around 9/25/2019).    Future Appointments   Date Time Provider Department Center   9/25/2019  3:20 PM Landon Felix MD [facility]

## 2019-09-25 ENCOUNTER — IMMUNIZATION (OUTPATIENT)
Dept: PHARMACY | Facility: CLINIC | Age: 44
End: 2019-09-25
Payer: COMMERCIAL

## 2019-09-25 ENCOUNTER — LAB VISIT (OUTPATIENT)
Dept: LAB | Facility: HOSPITAL | Age: 44
End: 2019-09-25
Attending: INTERNAL MEDICINE
Payer: COMMERCIAL

## 2019-09-25 ENCOUNTER — OFFICE VISIT (OUTPATIENT)
Dept: INTERNAL MEDICINE | Facility: CLINIC | Age: 44
End: 2019-09-25
Payer: COMMERCIAL

## 2019-09-25 VITALS
WEIGHT: 160.06 LBS | DIASTOLIC BLOOD PRESSURE: 66 MMHG | HEIGHT: 68 IN | BODY MASS INDEX: 24.26 KG/M2 | HEART RATE: 75 BPM | SYSTOLIC BLOOD PRESSURE: 114 MMHG | OXYGEN SATURATION: 98 %

## 2019-09-25 DIAGNOSIS — J30.89 PERENNIAL ALLERGIC RHINITIS: ICD-10-CM

## 2019-09-25 DIAGNOSIS — Z00.00 ROUTINE GENERAL MEDICAL EXAMINATION AT A HEALTH CARE FACILITY: Primary | ICD-10-CM

## 2019-09-25 DIAGNOSIS — Z00.00 ROUTINE GENERAL MEDICAL EXAMINATION AT A HEALTH CARE FACILITY: ICD-10-CM

## 2019-09-25 LAB
ALBUMIN SERPL BCP-MCNC: 3.9 G/DL (ref 3.5–5.2)
ALP SERPL-CCNC: 41 U/L (ref 55–135)
ALT SERPL W/O P-5'-P-CCNC: 37 U/L (ref 10–44)
ANION GAP SERPL CALC-SCNC: 8 MMOL/L (ref 8–16)
AST SERPL-CCNC: 39 U/L (ref 10–40)
BASOPHILS # BLD AUTO: 0.04 K/UL (ref 0–0.2)
BASOPHILS NFR BLD: 0.9 % (ref 0–1.9)
BILIRUB SERPL-MCNC: 0.7 MG/DL (ref 0.1–1)
BUN SERPL-MCNC: 11 MG/DL (ref 6–20)
CALCIUM SERPL-MCNC: 9.1 MG/DL (ref 8.7–10.5)
CHLORIDE SERPL-SCNC: 103 MMOL/L (ref 95–110)
CO2 SERPL-SCNC: 25 MMOL/L (ref 23–29)
CREAT SERPL-MCNC: 0.9 MG/DL (ref 0.5–1.4)
DIFFERENTIAL METHOD: ABNORMAL
EOSINOPHIL # BLD AUTO: 0.2 K/UL (ref 0–0.5)
EOSINOPHIL NFR BLD: 4.2 % (ref 0–8)
ERYTHROCYTE [DISTWIDTH] IN BLOOD BY AUTOMATED COUNT: 12.8 % (ref 11.5–14.5)
EST. GFR  (AFRICAN AMERICAN): >60 ML/MIN/1.73 M^2
EST. GFR  (NON AFRICAN AMERICAN): >60 ML/MIN/1.73 M^2
GLUCOSE SERPL-MCNC: 79 MG/DL (ref 70–110)
HCT VFR BLD AUTO: 36.3 % (ref 37–48.5)
HGB BLD-MCNC: 12.2 G/DL (ref 12–16)
LYMPHOCYTES # BLD AUTO: 1.8 K/UL (ref 1–4.8)
LYMPHOCYTES NFR BLD: 42.3 % (ref 18–48)
MCH RBC QN AUTO: 29.8 PG (ref 27–31)
MCHC RBC AUTO-ENTMCNC: 33.6 G/DL (ref 32–36)
MCV RBC AUTO: 89 FL (ref 82–98)
MONOCYTES # BLD AUTO: 0.3 K/UL (ref 0.3–1)
MONOCYTES NFR BLD: 7.7 % (ref 4–15)
NEUTROPHILS # BLD AUTO: 1.9 K/UL (ref 1.8–7.7)
NEUTROPHILS NFR BLD: 44.9 % (ref 38–73)
PLATELET # BLD AUTO: 215 K/UL (ref 150–350)
PMV BLD AUTO: 9.9 FL (ref 9.2–12.9)
POTASSIUM SERPL-SCNC: 4 MMOL/L (ref 3.5–5.1)
PROT SERPL-MCNC: 7.7 G/DL (ref 6–8.4)
RBC # BLD AUTO: 4.09 M/UL (ref 4–5.4)
SODIUM SERPL-SCNC: 136 MMOL/L (ref 136–145)
WBC # BLD AUTO: 4.28 K/UL (ref 3.9–12.7)

## 2019-09-25 PROCEDURE — 85025 COMPLETE CBC W/AUTO DIFF WBC: CPT

## 2019-09-25 PROCEDURE — 99999 PR PBB SHADOW E&M-EST. PATIENT-LVL IV: ICD-10-PCS | Mod: PBBFAC,,, | Performed by: INTERNAL MEDICINE

## 2019-09-25 PROCEDURE — 99396 PREV VISIT EST AGE 40-64: CPT | Mod: S$GLB,,, | Performed by: INTERNAL MEDICINE

## 2019-09-25 PROCEDURE — 80061 LIPID PANEL: CPT

## 2019-09-25 PROCEDURE — 99999 PR PBB SHADOW E&M-EST. PATIENT-LVL IV: CPT | Mod: PBBFAC,,, | Performed by: INTERNAL MEDICINE

## 2019-09-25 PROCEDURE — 99396 PR PREVENTIVE VISIT,EST,40-64: ICD-10-PCS | Mod: S$GLB,,, | Performed by: INTERNAL MEDICINE

## 2019-09-25 PROCEDURE — 36415 COLL VENOUS BLD VENIPUNCTURE: CPT

## 2019-09-25 PROCEDURE — 80053 COMPREHEN METABOLIC PANEL: CPT

## 2019-09-25 NOTE — PROGRESS NOTES
Subjective:       Patient ID: Radha Tabor is a 44 y.o. female.    Chief Complaint: Follow-up (3 week (patient stated she is feeling much better))    Here for annual exam    Some wt gain. Drinks sweet drinks.    Review of Systems   Constitutional: Negative for activity change and unexpected weight change.   HENT: Positive for rhinorrhea (improved with nasal spray and allegra). Negative for hearing loss and trouble swallowing.    Eyes: Negative for discharge and visual disturbance.   Respiratory: Negative for chest tightness and wheezing.    Cardiovascular: Negative for chest pain and palpitations.   Gastrointestinal: Negative for blood in stool, constipation, diarrhea and vomiting.   Endocrine: Negative for polydipsia and polyuria.   Genitourinary: Negative for difficulty urinating, dysuria, hematuria and menstrual problem.   Musculoskeletal: Negative for arthralgias, joint swelling and neck pain.   Neurological: Negative for weakness and headaches.   Psychiatric/Behavioral: Negative for confusion and dysphoric mood.       Objective:      Physical Exam   Constitutional: She is oriented to person, place, and time. She appears well-developed and well-nourished. No distress.   HENT:   Head: Normocephalic and atraumatic.   Eyes: Pupils are equal, round, and reactive to light. No scleral icterus.   Neck: Normal range of motion. No thyromegaly present.   Cardiovascular: Normal rate, regular rhythm and normal heart sounds. Exam reveals no gallop and no friction rub.   No murmur heard.  Pulmonary/Chest: Effort normal and breath sounds normal. No respiratory distress. She has no wheezes. She has no rales.   Abdominal: Soft. Bowel sounds are normal. She exhibits no distension and no mass. There is no tenderness. There is no rebound and no guarding.   Musculoskeletal: Normal range of motion. She exhibits no edema or tenderness.   Lymphadenopathy:     She has no cervical adenopathy.   Neurological: She is alert and oriented to  person, place, and time.   Skin: She is not diaphoretic.   Psychiatric: She has a normal mood and affect. Her speech is normal and behavior is normal. Cognition and memory are normal.       Assessment:       1. Routine general medical examination at a health care facility    2. Perennial allergic rhinitis        Plan:       Radha was seen today for follow-up.    Diagnoses and all orders for this visit:    Routine general medical examination at a health care facility  -     CBC auto differential; Future  -     Comprehensive metabolic panel; Future  -     Lipid panel; Future  -     Ambulatory Referral to Gynecology  Due for pap    Perennial allergic rhinitis  btr controlled now    Discussed wt gain and trying to cut back on coke/sugary drinks      Health Maintenance       Date Due Completion Date    TETANUS VACCINE 04/09/1993 ---    Pneumococcal Vaccine (Medium Risk) (1 of 1 - PPSV23) 04/09/1994 ---    Pap Smear with HPV Cotest 05/09/2019 5/9/2016    Influenza Vaccine (1) 09/01/2019 ---    Mammogram 08/03/2021 8/3/2019          Follow up in about 1 year (around 9/25/2020) for Flu vax please.    No future appointments.

## 2019-09-26 LAB
CHOLEST SERPL-MCNC: 200 MG/DL (ref 120–199)
CHOLEST/HDLC SERPL: 3.3 {RATIO} (ref 2–5)
HDLC SERPL-MCNC: 61 MG/DL (ref 40–75)
HDLC SERPL: 30.5 % (ref 20–50)
LDLC SERPL CALC-MCNC: 123.6 MG/DL (ref 63–159)
NONHDLC SERPL-MCNC: 139 MG/DL
TRIGL SERPL-MCNC: 77 MG/DL (ref 30–150)

## 2019-12-11 ENCOUNTER — OFFICE VISIT (OUTPATIENT)
Dept: URGENT CARE | Facility: CLINIC | Age: 44
End: 2019-12-11
Payer: COMMERCIAL

## 2019-12-11 VITALS
DIASTOLIC BLOOD PRESSURE: 70 MMHG | WEIGHT: 160 LBS | HEIGHT: 68 IN | OXYGEN SATURATION: 98 % | RESPIRATION RATE: 18 BRPM | SYSTOLIC BLOOD PRESSURE: 123 MMHG | BODY MASS INDEX: 24.25 KG/M2 | HEART RATE: 106 BPM | TEMPERATURE: 102 F

## 2019-12-11 DIAGNOSIS — R50.9 FEVER, UNSPECIFIED FEVER CAUSE: ICD-10-CM

## 2019-12-11 DIAGNOSIS — J02.0 STREP PHARYNGITIS: Primary | ICD-10-CM

## 2019-12-11 LAB
CTP QC/QA: YES
CTP QC/QA: YES
FLUAV AG NPH QL: NEGATIVE
FLUBV AG NPH QL: NEGATIVE
S PYO RRNA THROAT QL PROBE: POSITIVE

## 2019-12-11 PROCEDURE — 87880 POCT RAPID STREP A: ICD-10-PCS | Mod: QW,S$GLB,, | Performed by: NURSE PRACTITIONER

## 2019-12-11 PROCEDURE — 99214 PR OFFICE/OUTPT VISIT, EST, LEVL IV, 30-39 MIN: ICD-10-PCS | Mod: 25,S$GLB,, | Performed by: NURSE PRACTITIONER

## 2019-12-11 PROCEDURE — 87804 POCT INFLUENZA A/B: ICD-10-PCS | Mod: QW,S$GLB,, | Performed by: NURSE PRACTITIONER

## 2019-12-11 PROCEDURE — 87880 STREP A ASSAY W/OPTIC: CPT | Mod: QW,S$GLB,, | Performed by: NURSE PRACTITIONER

## 2019-12-11 PROCEDURE — 87804 INFLUENZA ASSAY W/OPTIC: CPT | Mod: QW,S$GLB,, | Performed by: NURSE PRACTITIONER

## 2019-12-11 PROCEDURE — 99214 OFFICE O/P EST MOD 30 MIN: CPT | Mod: 25,S$GLB,, | Performed by: NURSE PRACTITIONER

## 2019-12-11 RX ORDER — ACETAMINOPHEN 500 MG
1000 TABLET ORAL
Status: COMPLETED | OUTPATIENT
Start: 2019-12-11 | End: 2019-12-11

## 2019-12-11 RX ORDER — AMOXICILLIN 875 MG/1
875 TABLET, FILM COATED ORAL 2 TIMES DAILY
Qty: 20 TABLET | Refills: 0 | Status: SHIPPED | OUTPATIENT
Start: 2019-12-11 | End: 2019-12-21

## 2019-12-11 RX ADMIN — Medication 1000 MG: at 10:12

## 2019-12-11 NOTE — PROGRESS NOTES
"Subjective:       Patient ID: Radha Tabor is a 44 y.o. female.    Vitals:  height is 5' 8" (1.727 m) and weight is 72.6 kg (160 lb). Her temperature is 101.5 °F (38.6 °C) (abnormal). Her blood pressure is 123/70 and her pulse is 106. Her respiration is 18 and oxygen saturation is 98%.     Chief Complaint: Sore Throat (started on yesterday ) and Otalgia (BOTH EARS )    Patient presents with a sore throat since yesterday.  She has not taken anything for fever.  Denies sick contacts.    Sore Throat    This is a new problem. The current episode started yesterday. The problem has been gradually worsening. The maximum temperature recorded prior to her arrival was 101 - 101.9 F. The pain is at a severity of 8/10. The pain is severe. Associated symptoms include congestion and ear pain. Pertinent negatives include no abdominal pain, coughing, diarrhea, drooling, ear discharge, headaches, hoarse voice, plugged ear sensation, neck pain, shortness of breath, stridor, swollen glands, trouble swallowing or vomiting. She has had no exposure to strep or mono. Treatments tried: ORANGE JUICE AND OTC COLD MEDS. The treatment provided mild relief.       Constitution: Negative for chills, sweating, fatigue and fever.   HENT: Positive for ear pain, congestion and sore throat. Negative for ear discharge, drooling, sinus pain, sinus pressure, trouble swallowing and voice change.    Neck: Negative for neck pain and painful lymph nodes.   Eyes: Negative for eye redness.   Respiratory: Negative for chest tightness, cough, sputum production, bloody sputum, COPD, shortness of breath, stridor, wheezing and asthma.    Gastrointestinal: Negative for abdominal pain, nausea, vomiting and diarrhea.   Musculoskeletal: Negative for muscle ache.   Skin: Negative for rash.   Allergic/Immunologic: Negative for seasonal allergies and asthma.   Neurological: Negative for headaches.   Hematologic/Lymphatic: Negative for swollen lymph nodes.     "   Objective:      Physical Exam   Constitutional: She is oriented to person, place, and time. She appears well-developed and well-nourished. She is cooperative.  Non-toxic appearance. She does not have a sickly appearance. She does not appear ill. No distress.   HENT:   Head: Normocephalic and atraumatic.   Right Ear: Hearing, tympanic membrane, external ear and ear canal normal.   Left Ear: Hearing, tympanic membrane, external ear and ear canal normal.   Nose: Nose normal. No mucosal edema, rhinorrhea or nasal deformity. No epistaxis. Right sinus exhibits no maxillary sinus tenderness and no frontal sinus tenderness. Left sinus exhibits no maxillary sinus tenderness and no frontal sinus tenderness.   Mouth/Throat: Uvula is midline and mucous membranes are normal. No trismus in the jaw. Normal dentition. No uvula swelling. Posterior oropharyngeal erythema present. No oropharyngeal exudate, posterior oropharyngeal edema, tonsillar abscesses or cobblestoning. Tonsils are 2+ on the right. Tonsils are 1+ on the left. Tonsillar exudate.   Eyes: Conjunctivae and lids are normal. No scleral icterus.   Neck: Trachea normal, full passive range of motion without pain and phonation normal. Neck supple. No neck rigidity. No edema and no erythema present.   Cardiovascular: Normal rate, regular rhythm, normal heart sounds, intact distal pulses and normal pulses.   Pulmonary/Chest: Effort normal and breath sounds normal. No respiratory distress. She has no decreased breath sounds. She has no rhonchi.   Abdominal: Normal appearance.   Musculoskeletal: Normal range of motion. She exhibits no edema or deformity.   Lymphadenopathy:     She has cervical adenopathy.        Right cervical: Superficial cervical adenopathy present.        Left cervical: Superficial cervical adenopathy present.   Neurological: She is alert and oriented to person, place, and time. She exhibits normal muscle tone. Coordination normal.   Skin: Skin is warm,  dry, intact, not diaphoretic and not pale.   Psychiatric: She has a normal mood and affect. Her speech is normal and behavior is normal. Judgment and thought content normal. Cognition and memory are normal.   Nursing note and vitals reviewed.    Results for orders placed or performed in visit on 12/11/19   POCT Influenza A/B   Result Value Ref Range    Rapid Influenza A Ag Negative Negative    Rapid Influenza B Ag Negative Negative     Acceptable Yes    POCT rapid strep A   Result Value Ref Range    Rapid Strep A Screen Positive (A) Negative     Acceptable Yes          Assessment:       1. Strep pharyngitis    2. Fever, unspecified fever cause        Plan:         Strep pharyngitis  -     amoxicillin (AMOXIL) 875 MG tablet; Take 1 tablet (875 mg total) by mouth 2 (two) times daily. for 10 days  Dispense: 20 tablet; Refill: 0    Fever, unspecified fever cause  -     POCT Influenza A/B  -     POCT rapid strep A  -     acetaminophen tablet 1,000 mg      Patient Instructions                                                         Pharyngitis   If your condition worsens or fails to improve we recommend that you receive another evaluation at the ER immediately or contact your PCP to discuss your concerns or return here. You must understand that you've received an urgent care treatment only and that you may be released before all your medical problems are known or treated. You the patient will arrange for followup care as instructed.   If the strept culture was done and returns negative in 3-5 days and you are still having a sore throat, you may need to get a mono spot test done or repeated.   Tylenol or ibuprofen for pain may help as long as you are not allergic to these meds or have a medical condition such as stomach ulcers, liver or kidney disease or taking blood thinners etc that would   prevent you from using these medications.   Rest and fluids will help as well.   If you were  prescribed antibiotics and are female and on BCP use additional methods to prevent pregnancy while on the antibiotics and for one cycle after       Pharyngitis: Strep (Confirmed)    You have had a positive test for strep throat. Strep throat is a contagious illness. It is spread by coughing, kissing or by touching others after touching your mouth or nose. Symptoms include throat pain that is worse with swallowing, aching all over, headache, and fever. It is treated with antibiotic medicine. This should help you start to feel better in 1 to 2 days.  Home care  · Rest at home. Drink plenty of fluids to you won't get dehydrated.  · No work or school for the first 2 days of taking the antibiotics. After this time, you will not be contagious. You can then return to school or work if you are feeling better.   · Take antibiotic medicine for the full 10 days, even if you feel better. This is very important to ensure the infection is treated. It is also important to prevent medicine-resistant germs from developing. If you were given an antibiotic shot, you don't need any more antibiotics.  · You may use acetaminophen or ibuprofen to control pain or fever, unless another medicine was prescribed for this. Talk with your doctor before taking these medicines if you have chronic liver or kidney disease. Also talk with your doctor if you have had a stomach ulcer or GI bleeding.  · Throat lozenges or sprays help reduce pain. Gargling with warm saltwater will also reduce throat pain. Dissolve 1/2 teaspoon of salt in 1 glass of warm water. This may be useful just before meals.   · Soft foods are OK. Avoid salty or spicy foods.  Follow-up care  Follow up with your healthcare provider or our staff if you don't get better over the next week.  When to seek medical advice  Call your healthcare provider right away if any of these occur:  · Fever of 100.4ºF (38ºC) or higher, or as directed by your healthcare provider  · New or worsening ear  pain, sinus pain, or headache  · Painful lumps in the back of neck  · Stiff neck  · Lymph nodes getting larger or becoming soft in the middle  · You can't swallow liquids or you can't open your mouth wide because of throat pain  · Signs of dehydration. These include very dark urine or no urine, sunken eyes, and dizziness.  · Trouble breathing or noisy breathing  · Muffled voice  · Rash  Date Last Reviewed: 4/13/2015  © 8263-1340 [employer]. [address] [place] [address], [place] [address]. All rights reserved. This information is not intended as a substitute for professional medical care. Always follow your healthcare professional's instructions.

## 2019-12-11 NOTE — PATIENT INSTRUCTIONS
Pharyngitis   If your condition worsens or fails to improve we recommend that you receive another evaluation at the ER immediately or contact your PCP to discuss your concerns or return here. You must understand that you've received an urgent care treatment only and that you may be released before all your medical problems are known or treated. You the patient will arrange for followup care as instructed.   If the strept culture was done and returns negative in 3-5 days and you are still having a sore throat, you may need to get a mono spot test done or repeated.   Tylenol or ibuprofen for pain may help as long as you are not allergic to these meds or have a medical condition such as stomach ulcers, liver or kidney disease or taking blood thinners etc that would   prevent you from using these medications.   Rest and fluids will help as well.   If you were prescribed antibiotics and are female and on BCP use additional methods to prevent pregnancy while on the antibiotics and for one cycle after       Pharyngitis: Strep (Confirmed)    You have had a positive test for strep throat. Strep throat is a contagious illness. It is spread by coughing, kissing or by touching others after touching your mouth or nose. Symptoms include throat pain that is worse with swallowing, aching all over, headache, and fever. It is treated with antibiotic medicine. This should help you start to feel better in 1 to 2 days.  Home care  · Rest at home. Drink plenty of fluids to you won't get dehydrated.  · No work or school for the first 2 days of taking the antibiotics. After this time, you will not be contagious. You can then return to school or work if you are feeling better.   · Take antibiotic medicine for the full 10 days, even if you feel better. This is very important to ensure the infection is treated. It is also important to prevent medicine-resistant germs from developing. If  you were given an antibiotic shot, you don't need any more antibiotics.  · You may use acetaminophen or ibuprofen to control pain or fever, unless another medicine was prescribed for this. Talk with your doctor before taking these medicines if you have chronic liver or kidney disease. Also talk with your doctor if you have had a stomach ulcer or GI bleeding.  · Throat lozenges or sprays help reduce pain. Gargling with warm saltwater will also reduce throat pain. Dissolve 1/2 teaspoon of salt in 1 glass of warm water. This may be useful just before meals.   · Soft foods are OK. Avoid salty or spicy foods.  Follow-up care  Follow up with your healthcare provider or our staff if you don't get better over the next week.  When to seek medical advice  Call your healthcare provider right away if any of these occur:  · Fever of 100.4ºF (38ºC) or higher, or as directed by your healthcare provider  · New or worsening ear pain, sinus pain, or headache  · Painful lumps in the back of neck  · Stiff neck  · Lymph nodes getting larger or becoming soft in the middle  · You can't swallow liquids or you can't open your mouth wide because of throat pain  · Signs of dehydration. These include very dark urine or no urine, sunken eyes, and dizziness.  · Trouble breathing or noisy breathing  · Muffled voice  · Rash  Date Last Reviewed: 4/13/2015  © 9976-2001 [employer]. [address] [place] [id number]. All rights reserved. This information is not intended as a substitute for professional medical care. Always follow your healthcare professional's instructions.

## 2019-12-11 NOTE — LETTER
December 11, 2019      [facility] [address] [facility] [address]  Phone: [phone]  Fax: [phone]       Patient: Radha Tabor   YOB: 1975  Date of Visit: 12/11/2019    To Whom It May Concern:    Kiersten Tabor  was at [facility] on 12/11/2019. Please excuse from work on 12/11/19. If you have any questions or concerns, or if I can be of further assistance, please do not hesitate to contact me.    Sincerely,        Marry Wills, NP

## 2020-06-20 ENCOUNTER — HOSPITAL ENCOUNTER (EMERGENCY)
Facility: OTHER | Age: 45
Discharge: HOME | End: 2020-06-20
Attending: EMERGENCY MEDICINE
Payer: COMMERCIAL

## 2020-06-20 ENCOUNTER — OFFICE VISIT (OUTPATIENT)
Dept: FAMILY MEDICINE | Facility: CLINIC | Age: 45
End: 2020-06-20
Payer: COMMERCIAL

## 2020-06-20 VITALS
BODY MASS INDEX: 23.79 KG/M2 | SYSTOLIC BLOOD PRESSURE: 125 MMHG | TEMPERATURE: 98 F | OXYGEN SATURATION: 100 % | HEART RATE: 64 BPM | DIASTOLIC BLOOD PRESSURE: 76 MMHG | HEIGHT: 68 IN | WEIGHT: 157 LBS | RESPIRATION RATE: 16 BRPM

## 2020-06-20 DIAGNOSIS — R07.89 CHEST PRESSURE: Primary | ICD-10-CM

## 2020-06-20 DIAGNOSIS — M79.602 ARM PAIN, LEFT: ICD-10-CM

## 2020-06-20 DIAGNOSIS — R07.9 CHEST PAIN, UNSPECIFIED TYPE: ICD-10-CM

## 2020-06-20 DIAGNOSIS — M25.512 ACUTE PAIN OF LEFT SHOULDER: Primary | ICD-10-CM

## 2020-06-20 DIAGNOSIS — R06.02 SOB (SHORTNESS OF BREATH): ICD-10-CM

## 2020-06-20 LAB
ANION GAP SERPL CALC-SCNC: 8 MMOL/L (ref 8–16)
BASOPHILS # BLD AUTO: 0.05 K/UL (ref 0–0.2)
BASOPHILS NFR BLD: 1.2 % (ref 0–1.9)
BUN SERPL-MCNC: 9 MG/DL (ref 6–20)
CALCIUM SERPL-MCNC: 9.1 MG/DL (ref 8.7–10.5)
CHLORIDE SERPL-SCNC: 107 MMOL/L (ref 95–110)
CO2 SERPL-SCNC: 22 MMOL/L (ref 23–29)
CREAT SERPL-MCNC: 0.8 MG/DL (ref 0.5–1.4)
DIFFERENTIAL METHOD: ABNORMAL
EOSINOPHIL # BLD AUTO: 0.3 K/UL (ref 0–0.5)
EOSINOPHIL NFR BLD: 6 % (ref 0–8)
ERYTHROCYTE [DISTWIDTH] IN BLOOD BY AUTOMATED COUNT: 12.4 % (ref 11.5–14.5)
EST. GFR  (AFRICAN AMERICAN): >60 ML/MIN/1.73 M^2
EST. GFR  (NON AFRICAN AMERICAN): >60 ML/MIN/1.73 M^2
GLUCOSE SERPL-MCNC: 82 MG/DL (ref 70–110)
HCT VFR BLD AUTO: 35.4 % (ref 37–48.5)
HGB BLD-MCNC: 11.8 G/DL (ref 12–16)
IMM GRANULOCYTES # BLD AUTO: 0.01 K/UL (ref 0–0.04)
IMM GRANULOCYTES NFR BLD AUTO: 0.2 % (ref 0–0.5)
LYMPHOCYTES # BLD AUTO: 2.1 K/UL (ref 1–4.8)
LYMPHOCYTES NFR BLD: 51.2 % (ref 18–48)
MCH RBC QN AUTO: 30.1 PG (ref 27–31)
MCHC RBC AUTO-ENTMCNC: 33.3 G/DL (ref 32–36)
MCV RBC AUTO: 90 FL (ref 82–98)
MONOCYTES # BLD AUTO: 0.4 K/UL (ref 0.3–1)
MONOCYTES NFR BLD: 9.1 % (ref 4–15)
NEUTROPHILS # BLD AUTO: 1.3 K/UL (ref 1.8–7.7)
NEUTROPHILS NFR BLD: 32.3 % (ref 38–73)
NRBC BLD-RTO: 0 /100 WBC
PLATELET # BLD AUTO: 190 K/UL (ref 150–350)
PMV BLD AUTO: 9.2 FL (ref 9.2–12.9)
POTASSIUM SERPL-SCNC: 3.9 MMOL/L (ref 3.5–5.1)
RBC # BLD AUTO: 3.92 M/UL (ref 4–5.4)
SODIUM SERPL-SCNC: 137 MMOL/L (ref 136–145)
TROPONIN I SERPL DL<=0.01 NG/ML-MCNC: <0.006 NG/ML (ref 0–0.03)
TROPONIN I SERPL DL<=0.01 NG/ML-MCNC: <0.006 NG/ML (ref 0–0.03)
WBC # BLD AUTO: 4.16 K/UL (ref 3.9–12.7)

## 2020-06-20 PROCEDURE — 80048 BASIC METABOLIC PNL TOTAL CA: CPT

## 2020-06-20 PROCEDURE — 99283 EMERGENCY DEPT VISIT LOW MDM: CPT

## 2020-06-20 PROCEDURE — 99214 PR OFFICE/OUTPT VISIT, EST, LEVL IV, 30-39 MIN: ICD-10-PCS | Mod: 95,,, | Performed by: FAMILY MEDICINE

## 2020-06-20 PROCEDURE — 84484 ASSAY OF TROPONIN QUANT: CPT | Mod: 91

## 2020-06-20 PROCEDURE — 85025 COMPLETE CBC W/AUTO DIFF WBC: CPT

## 2020-06-20 PROCEDURE — 99214 OFFICE O/P EST MOD 30 MIN: CPT | Mod: 95,,, | Performed by: FAMILY MEDICINE

## 2020-06-20 NOTE — PROGRESS NOTES
Assessment & Plan  Problem List Items Addressed This Visit     None      Visit Diagnoses     Chest pressure    -  Primary    Arm pain, left        SOB (shortness of breath)          please head to an ER to be evaluated.  While her symptoms have subsided it is very concerning based upon her history and description of the chest pressure and arm pain.       Health Maintenance reviewed.    Follow-up: as needed     ______________________________________________________________________    Chief Complaint  No chief complaint on file.      HPI  Radha Tabor is a 45 y.o. female with multiple medical diagnoses as listed in the medical history and problem list that presents for chest pain and SOB.  Pt is new to me.   The patient location is: home  The chief complaint leading to consultation is: chest pressure and SOB    Visit type: audiovisual    Face to Face time with patient: 10  16 minutes of total time spent on the encounter, which includes face to face time and non-face to face time preparing to see the patient (eg, review of tests), Obtaining and/or reviewing separately obtained history, Documenting clinical information in the electronic or other health record, Independently interpreting results (not separately reported) and communicating results to the patient/family/caregiver, or Care coordination (not separately reported).         Each patient to whom he or she provides medical services by telemedicine is:  (1) informed of the relationship between the physician and patient and the respective role of any other health care provider with respect to management of the patient; and (2) notified that he or she may decline to receive medical services by telemedicine and may withdraw from such care at any time.    Notes:   Answers for HPI/ROS submitted by the patient on 6/20/2020   Shortness of breath  Chronicity: new  Onset: in the past 7 days  Frequency: every several days  Progression since onset: unchanged  Episode  "duration: 3 hours  claudication: No  coryza: No  hemoptysis: No  leg pain: No  orthopnea: Yes  PND: No  sputum production: Yes  swollen glands: No  syncope: No  Aggravating factors: emotional upset  Risk factors for DVT/PE: no known risk factors  Treatments tried: nothing  asthma: No  allergies: Yes  COPD: No  pneumonia: Yes  aspirin allergies: No  CAD: No  DVT: No  heart failure: No  PE: No  recent surgery: No  bronchiolitis: No  chronic lung disease: No      She reports a few days ago she had chest pressure with noted SOB.  She does report increased stress at that time which may have caused her chest discomfort.  She does have a very benign medical history.  I reviewed her last set of blood work that did show a mild elevation in her cholesterol.  She did develop left sided arm pain that did radiate down her left arm.  This pain has improved, but it was a a very "weird" sensation.  This sensation is not one that she has had in the past.  She does indicate that she had to place her head on the table because of the pain.          PAST MEDICAL HISTORY:  Past Medical History:   Diagnosis Date    Acute hepatitis C without mention of hepatic coma(070.51)     Endometriosis, site unspecified     Viral hepatitis C 6/18/2016    Viral hepatitis C 6/18/2016       PAST SURGICAL HISTORY:  Past Surgical History:   Procedure Laterality Date    Dx scope  10/16/2012    TDP hysteroscope    HYSTEROSCOPY      endometriosis    LASER LAPAROSCOPY      NASAL SEPTUM SURGERY         SOCIAL HISTORY:  Social History     Socioeconomic History    Marital status:      Spouse name: Not on file    Number of children: Not on file    Years of education: Not on file    Highest education level: Not on file   Occupational History    Not on file   Social Needs    Financial resource strain: Not hard at all    Food insecurity     Worry: Never true     Inability: Never true    Transportation needs     Medical: No     Non-medical: No "   Tobacco Use    Smoking status: Never Smoker    Smokeless tobacco: Never Used   Substance and Sexual Activity    Alcohol use: No     Alcohol/week: 0.0 standard drinks     Frequency: Never     Drinks per session: Patient refused     Binge frequency: Never    Drug use: No    Sexual activity: Yes     Partners: Male     Birth control/protection: None   Lifestyle    Physical activity     Days per week: 1 day     Minutes per session: Not on file    Stress: Only a little   Relationships    Social connections     Talks on phone: More than three times a week     Gets together: Once a week     Attends Islam service: Not on file     Active member of club or organization: Yes     Attends meetings of clubs or organizations: More than 4 times per year     Relationship status:    Other Topics Concern    Not on file   Social History Narrative     at [employer],  (HTN), 2 step kids, 20, 18.       FAMILY HISTORY:  Family History   Problem Relation Age of Onset    Hypertension Mother     Heart disease Mother         murmur    Hypertension Father     Heart disease Paternal Grandmother     Breast cancer Neg Hx     Colon cancer Neg Hx     Ovarian cancer Neg Hx        ALLERGIES AND MEDICATIONS: updated and reviewed.  Review of patient's allergies indicates:   Allergen Reactions    Hay fever and allergy relief Hives and Itching     Current Outpatient Medications   Medication Sig Dispense Refill    albuterol 90 mcg/actuation inhaler Inhale 1-2 puffs into the lungs every 6 (six) hours as needed for Wheezing. 1 Inhaler 0    fexofenadine (ALLEGRA) 180 MG tablet Take 1 tablet (180 mg total) by mouth once daily. 30 tablet 6    flu vacc [id number] 6mos up,PF, (FLUARIX QUAD [id number], PF,) 60 mcg (15 mcg x 4)/0.5 mL Syrg Inject 0.5 mLs into the muscle. (Patient not taking: Reported on 12/11/2019) 0.5 mL 0    fluticasone propionate (FLONASE) 50 mcg/actuation nasal spray 2 sprays (100 mcg total)  by Each Nostril route once daily. 16 g 6    meloxicam (MOBIC) 15 MG tablet Take 1 tablet (15 mg total) by mouth once daily. (Patient not taking: Reported on 12/11/2019) 30 tablet 2    mupirocin (BACTROBAN) 2 % ointment Apply to affected area 3 times daily 22 g 1    ofloxacin (FLOXIN) 0.3 % otic solution Place 5 drops into the left ear once daily. 5 mL 0     No current facility-administered medications for this visit.          ROS  Review of Systems   Constitutional: Negative for fever.   HENT: Negative for ear pain, rhinorrhea and sore throat.    Respiratory: Positive for shortness of breath. Negative for wheezing.    Cardiovascular: Positive for chest pain. Negative for leg swelling.   Gastrointestinal: Negative for abdominal pain and vomiting.   Musculoskeletal: Negative for neck pain.   Skin: Negative for rash.   Neurological: Negative for headaches.         Physical Exam  There were no vitals filed for this visit. There is no height or weight on file to calculate BMI.          Physical Exam  Constitutional:       Appearance: Normal appearance.   HENT:      Head: Normocephalic.   Eyes:      Extraocular Movements: Extraocular movements intact.   Cardiovascular:      Comments: Heart beating spontaneously   Pulmonary:      Effort: Pulmonary effort is normal.   Neurological:      Mental Status: She is oriented to person, place, and time.   Psychiatric:         Mood and Affect: Mood normal.         Behavior: Behavior normal.         Health Maintenance       Date Due Completion Date    TETANUS VACCINE 04/09/1993 ---    Pneumococcal Vaccine (Medium Risk) (1 of 1 - PPSV23) 04/09/1994 ---    Cervical Cancer Screening 05/09/2019 5/9/2016    Mammogram 08/03/2021 8/3/2019    Lipid Panel 09/25/2024 9/25/2019              Patient note was created using M2G.  Any errors in syntax or even information may not have been identified and edited on initial review prior to signing this note.

## 2020-06-20 NOTE — ED TRIAGE NOTES
Pt AAOx3, pt presented to the ED with pain in her L arm x 1 day. Pt stated she has been having chest pains/heaviness with SOB as well intermitted. Pt denies N/V/D, chest pain currently, and/or fever/chills.

## 2020-06-20 NOTE — ED PROVIDER NOTES
"Encounter Date: 6/20/2020    SCRIBE #1 NOTE: I, Albania Kendrick, am scribing for, and in the presence of, Dr. Ferreira.       History     Chief Complaint   Patient presents with    Arm Problem     left arm pain since today.  patient had c/p two days ago      Time seen by provider: 6:11 PM    This is a 45 y.o. female who presents with complaint of left arm pain that began about five hours ago. Patient reports an episode of "burning" upper left arm pain and lightheadedness while sitting down earlier today. She states episode lasted about 15-20 minutes and rated a 5/10 at that time. She notes pain was unchanged by massaging the area. Patient additionally reports an episode of chest pain with associated SOB three days ago that felt like "someone sitting on her chest." She notes that episode lasted about 1 hour. Patient states that she had a similar episode of chest pain two weeks prior. Patient states that she scheduled a telemedicine appointment at that time, and the appointment was today. She states that she was instructed to come to the ED for further evaluation. She denies any nausea, vomiting, or diaphoresis. Patient denies history of HTN, DM, or HLD. She denies family history of CAD or MI. She denies tobacco use.    The history is provided by the patient.     Review of patient's allergies indicates:   Allergen Reactions    Hay fever and allergy relief Hives and Itching     Past Medical History:   Diagnosis Date    Acute hepatitis C without mention of hepatic coma(070.51)     Endometriosis, site unspecified     Viral hepatitis C 6/18/2016    Viral hepatitis C 6/18/2016     Past Surgical History:   Procedure Laterality Date    Dx scope  10/16/2012    TDP hysteroscope    HYSTEROSCOPY      endometriosis    LASER LAPAROSCOPY      NASAL SEPTUM SURGERY       Family History   Problem Relation Age of Onset    Hypertension Mother     Heart disease Mother         murmur    Hypertension Father     Heart " disease Paternal Grandmother     Breast cancer Neg Hx     Colon cancer Neg Hx     Ovarian cancer Neg Hx      Social History     Tobacco Use    Smoking status: Never Smoker    Smokeless tobacco: Never Used   Substance Use Topics    Alcohol use: No     Alcohol/week: 0.0 standard drinks     Frequency: Never     Drinks per session: Patient refused     Binge frequency: Never    Drug use: No     Review of Systems   Constitutional: Negative for chills, diaphoresis and fever.   HENT: Negative for sore throat.    Respiratory: Negative for shortness of breath.    Cardiovascular: Negative for chest pain.   Gastrointestinal: Negative for abdominal pain, diarrhea, nausea and vomiting.   Genitourinary: Negative for dysuria.   Musculoskeletal: Negative for back pain.        Positive for LUE pain.   Skin: Negative for rash.   Neurological: Positive for light-headedness. Negative for dizziness, syncope, weakness, numbness and headaches.   Hematological: Does not bruise/bleed easily.       Physical Exam     Initial Vitals [06/20/20 1723]   BP Pulse Resp Temp SpO2   114/61 64 16 97.6 °F (36.4 °C) 100 %      MAP       --         Physical Exam    Nursing note and vitals reviewed.  Constitutional: She appears well-developed and well-nourished.   HENT:   Head: Normocephalic and atraumatic.   Eyes: Conjunctivae and EOM are normal. Pupils are equal, round, and reactive to light.   Neck: Normal range of motion. Neck supple.   Cardiovascular: Normal rate, regular rhythm, normal heart sounds and normal pulses.   No murmur heard.  Pulmonary/Chest: Breath sounds normal. No respiratory distress.   Abdominal: Soft. Bowel sounds are normal. There is no abdominal tenderness. There is no rebound and no guarding.   Musculoskeletal: Normal range of motion. No tenderness.   Neurological: She is alert and oriented to person, place, and time. She has normal strength. No cranial nerve deficit.   Skin: Skin is warm and dry.   Psychiatric: She has a  normal mood and affect. Her behavior is normal. Thought content normal.         ED Course   Procedures  Labs Reviewed   CBC W/ AUTO DIFFERENTIAL - Abnormal; Notable for the following components:       Result Value    RBC 3.92 (*)     Hemoglobin 11.8 (*)     Hematocrit 35.4 (*)     Gran # (ANC) 1.3 (*)     Gran% 32.3 (*)     Lymph% 51.2 (*)     All other components within normal limits   BASIC METABOLIC PANEL - Abnormal; Notable for the following components:    CO2 22 (*)     All other components within normal limits   TROPONIN I   TROPONIN I     EKG Readings: (Independently Interpreted)   NSR rate of 74. No change from EKG on 6/16/2014.       Imaging Results    None          Medical Decision Making:   History:   Old Medical Records: I decided to obtain old medical records.  Initial Assessment:   A 45-year-old female presents with left arm pain that occurred approximately 3-4 hours prior to arrival.  Resolved in less 50 min period 3 days ago had some chest pain with tightness and shortness of breath.  States she has had this in the past however.  A based on her symptoms possible acute coronary syndrome although she  has no risk factors from past medical family or social history.  Possible musculoskeletal however did not improve or worsen with palpation.  She is in a job with a lot of stress going on right now possibly anxiety.  Her EKG is normal.  I do feel labs would be indicated on this patient an 2nd set of troponins.  If negative I feel she can be safely discharged home to follow up with primary care as an outpatient for further management.  Clinical Tests:   Lab Tests: Ordered and Reviewed  Medical Tests: Ordered and Reviewed    Additional MDM:   Heart Score:    History:          Slightly suspicious.  ECG:             Normal  Age:               45-65 years  Risk factors: no risk factors known  Troponin:       Less than or equal to normal limit  Final Score: 1             Scribe Attestation:   Scribe #1: I  performed the above scribed service and the documentation accurately describes the services I performed. I attest to the accuracy of the note.    Attending Attestation:           Physician Attestation for Scribe:  Physician Attestation Statement for Scribe #1: I, Dr. Ferreira, reviewed documentation, as scribed by Albania Kendrick in my presence, and it is both accurate and complete.                 ED Course as of Jun 20 2145   Sat Jun 20, 2020   1854 CBC reviewed whichIs normal.    [SM]   1940 Initial troponin is negative.  BMP is normal.    [SM]   2110 Repeat troponin is negative.  Will discharge the patient to follow up with primary care as an outpatient.    Patient discharged home in stable condition. Diagnosis and treatment plan explained to patient and/or family member who is at bedside. I have answered all questions and the patient is satisfied with the plan of care. Strict return precautions given. The patient demonstrates understanding of the care plan. This is the extent to the patients complaints today here in the emergency department.    [SM]      ED Course User Index  [SM] Noman Ferreira DO                Clinical Impression:     1. Acute pain of left shoulder    2. Chest pain, unspecified type                ED Disposition Condition    Discharge Stable        ED Prescriptions     None        Follow-up Information     Follow up With Specialties Details Why Contact Info    Landon Felix MD Internal Medicine Schedule an appointment as soon as possible for a visit   [address]  [place] [address]  [phone]      Franco Fisher MD INTERVENTIONAL CARDIOLOGY, Nuclear Medicine, Cardiovascular Disease Schedule an appointment as soon as possible for a visit  for cardiac stress test [address] [facility] [address]  [address]  [place] [address]  [phone]                                       Noman Ferreira DO  06/20/20 2146

## 2020-09-15 ENCOUNTER — PATIENT OUTREACH (OUTPATIENT)
Dept: ADMINISTRATIVE | Facility: OTHER | Age: 45
End: 2020-09-15

## 2020-09-15 NOTE — PROGRESS NOTES
Care Everywhere: updated   Immunization: updated  Health Maintenance: updated  Media Review:   Legacy Review:   Order placed:   Upcoming appts

## 2020-09-18 ENCOUNTER — PATIENT MESSAGE (OUTPATIENT)
Dept: INTERNAL MEDICINE | Facility: CLINIC | Age: 45
End: 2020-09-18

## 2020-09-18 DIAGNOSIS — Z12.31 ENCOUNTER FOR SCREENING MAMMOGRAM FOR MALIGNANT NEOPLASM OF BREAST: Primary | ICD-10-CM

## 2020-09-18 NOTE — TELEPHONE ENCOUNTER
Hi, please contact the patient to assist in scheduling    Orders Placed This Encounter    Mammo Digital Screening Bilat       Thank you, Landon Felix

## 2020-09-23 ENCOUNTER — OFFICE VISIT (OUTPATIENT)
Dept: SPORTS MEDICINE | Facility: CLINIC | Age: 45
End: 2020-09-23
Payer: COMMERCIAL

## 2020-09-23 ENCOUNTER — HOSPITAL ENCOUNTER (OUTPATIENT)
Dept: RADIOLOGY | Facility: HOSPITAL | Age: 45
Discharge: HOME | End: 2020-09-23
Attending: INTERNAL MEDICINE
Payer: COMMERCIAL

## 2020-09-23 VITALS
SYSTOLIC BLOOD PRESSURE: 117 MMHG | DIASTOLIC BLOOD PRESSURE: 78 MMHG | HEIGHT: 68 IN | HEART RATE: 89 BPM | WEIGHT: 162 LBS | BODY MASS INDEX: 24.55 KG/M2

## 2020-09-23 DIAGNOSIS — M99.06 SOMATIC DYSFUNCTION OF LOWER EXTREMITY: ICD-10-CM

## 2020-09-23 DIAGNOSIS — M99.02 SOMATIC DYSFUNCTION OF THORACIC REGION: ICD-10-CM

## 2020-09-23 DIAGNOSIS — Z12.31 ENCOUNTER FOR SCREENING MAMMOGRAM FOR MALIGNANT NEOPLASM OF BREAST: ICD-10-CM

## 2020-09-23 DIAGNOSIS — M53.3 SACRAL BACK PAIN: Primary | ICD-10-CM

## 2020-09-23 DIAGNOSIS — M99.05 SOMATIC DYSFUNCTION OF PELVIS REGION: ICD-10-CM

## 2020-09-23 DIAGNOSIS — M99.04 SOMATIC DYSFUNCTION OF SACRAL REGION: ICD-10-CM

## 2020-09-23 DIAGNOSIS — M99.03 SOMATIC DYSFUNCTION OF LUMBAR REGION: ICD-10-CM

## 2020-09-23 PROCEDURE — 3008F BODY MASS INDEX DOCD: CPT | Mod: CPTII,S$GLB,, | Performed by: ORTHOPAEDIC SURGERY

## 2020-09-23 PROCEDURE — 98927 PR OSTEOPATHIC MANIP,5-6 BODY REGN: ICD-10-PCS | Mod: S$GLB,,, | Performed by: ORTHOPAEDIC SURGERY

## 2020-09-23 PROCEDURE — 77063 MAMMO DIGITAL SCREENING BILAT WITH TOMO: ICD-10-PCS | Mod: 26,,, | Performed by: RADIOLOGY

## 2020-09-23 PROCEDURE — 99999 PR PBB SHADOW E&M-EST. PATIENT-LVL III: CPT | Mod: PBBFAC,,, | Performed by: ORTHOPAEDIC SURGERY

## 2020-09-23 PROCEDURE — 98927 OSTEOPATH MANJ 5-6 REGIONS: CPT | Mod: S$GLB,,, | Performed by: ORTHOPAEDIC SURGERY

## 2020-09-23 PROCEDURE — 99203 PR OFFICE/OUTPT VISIT, NEW, LEVL III, 30-44 MIN: ICD-10-PCS | Mod: 25,S$GLB,, | Performed by: ORTHOPAEDIC SURGERY

## 2020-09-23 PROCEDURE — 77067 MAMMO DIGITAL SCREENING BILAT WITH TOMO: ICD-10-PCS | Mod: 26,,, | Performed by: RADIOLOGY

## 2020-09-23 PROCEDURE — 99203 OFFICE O/P NEW LOW 30 MIN: CPT | Mod: 25,S$GLB,, | Performed by: ORTHOPAEDIC SURGERY

## 2020-09-23 PROCEDURE — 3008F PR BODY MASS INDEX (BMI) DOCUMENTED: ICD-10-PCS | Mod: CPTII,S$GLB,, | Performed by: ORTHOPAEDIC SURGERY

## 2020-09-23 PROCEDURE — 99999 PR PBB SHADOW E&M-EST. PATIENT-LVL III: ICD-10-PCS | Mod: PBBFAC,,, | Performed by: ORTHOPAEDIC SURGERY

## 2020-09-23 PROCEDURE — 77063 BREAST TOMOSYNTHESIS BI: CPT | Mod: 26,,, | Performed by: RADIOLOGY

## 2020-09-23 PROCEDURE — 97110 PR THERAPEUTIC EXERCISES: ICD-10-PCS | Mod: S$GLB,,, | Performed by: ORTHOPAEDIC SURGERY

## 2020-09-23 PROCEDURE — 97110 THERAPEUTIC EXERCISES: CPT | Mod: S$GLB,,, | Performed by: ORTHOPAEDIC SURGERY

## 2020-09-23 PROCEDURE — 77067 SCR MAMMO BI INCL CAD: CPT | Mod: TC

## 2020-09-23 PROCEDURE — 77067 SCR MAMMO BI INCL CAD: CPT | Mod: 26,,, | Performed by: RADIOLOGY

## 2020-10-01 DIAGNOSIS — J30.89 PERENNIAL ALLERGIC RHINITIS: ICD-10-CM

## 2020-10-01 RX ORDER — FLUTICASONE PROPIONATE 50 MCG
2 SPRAY, SUSPENSION (ML) NASAL DAILY
Qty: 16 G | Refills: 6 | Status: SHIPPED | OUTPATIENT
Start: 2020-10-01 | End: 2022-01-04 | Stop reason: SDUPTHER

## 2020-10-09 ENCOUNTER — OFFICE VISIT (OUTPATIENT)
Dept: SPORTS MEDICINE | Facility: CLINIC | Age: 45
End: 2020-10-09
Payer: COMMERCIAL

## 2020-10-09 VITALS
HEIGHT: 68 IN | HEART RATE: 67 BPM | SYSTOLIC BLOOD PRESSURE: 115 MMHG | BODY MASS INDEX: 24.55 KG/M2 | WEIGHT: 162 LBS | DIASTOLIC BLOOD PRESSURE: 69 MMHG

## 2020-10-09 DIAGNOSIS — M99.05 SOMATIC DYSFUNCTION OF PELVIS REGION: ICD-10-CM

## 2020-10-09 DIAGNOSIS — M99.04 SOMATIC DYSFUNCTION OF SACRAL REGION: ICD-10-CM

## 2020-10-09 DIAGNOSIS — M53.3 SACRAL BACK PAIN: Primary | ICD-10-CM

## 2020-10-09 DIAGNOSIS — M99.03 SOMATIC DYSFUNCTION OF LUMBAR REGION: ICD-10-CM

## 2020-10-09 PROCEDURE — 99999 PR PBB SHADOW E&M-EST. PATIENT-LVL III: ICD-10-PCS | Mod: PBBFAC,,, | Performed by: ORTHOPAEDIC SURGERY

## 2020-10-09 PROCEDURE — 3008F BODY MASS INDEX DOCD: CPT | Mod: CPTII,S$GLB,, | Performed by: ORTHOPAEDIC SURGERY

## 2020-10-09 PROCEDURE — 99999 PR PBB SHADOW E&M-EST. PATIENT-LVL III: CPT | Mod: PBBFAC,,, | Performed by: ORTHOPAEDIC SURGERY

## 2020-10-09 PROCEDURE — 3008F PR BODY MASS INDEX (BMI) DOCUMENTED: ICD-10-PCS | Mod: CPTII,S$GLB,, | Performed by: ORTHOPAEDIC SURGERY

## 2020-10-09 PROCEDURE — 99213 PR OFFICE/OUTPT VISIT, EST, LEVL III, 20-29 MIN: ICD-10-PCS | Mod: 25,S$GLB,, | Performed by: ORTHOPAEDIC SURGERY

## 2020-10-09 PROCEDURE — 98926 OSTEOPATH MANJ 3-4 REGIONS: CPT | Mod: S$GLB,,, | Performed by: ORTHOPAEDIC SURGERY

## 2020-10-09 PROCEDURE — 99213 OFFICE O/P EST LOW 20 MIN: CPT | Mod: 25,S$GLB,, | Performed by: ORTHOPAEDIC SURGERY

## 2020-10-09 PROCEDURE — 98926 PR OSTEOPATHIC MANIP,3-4 BODY REGN: ICD-10-PCS | Mod: S$GLB,,, | Performed by: ORTHOPAEDIC SURGERY

## 2020-10-09 NOTE — PROGRESS NOTES
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 Patient reports her pain is 2/10 today and she is feeling at least 75% improved. She admits to experiencing pain relief with OMT that has continued to last and also admits to being compliant with her HEP daily. She notes periodic episodes of low back pain, but states these are not frequent and her pain improves with time and with the exercises that she has done regularly.      Recall from visit on 09/23/2020  45 y.o. Female presents today for evaluation of her low back pain. Patient is the  at [employer]. She admits to constant and diffuse low back pain for the past 4-5 months after bending forward to pick something up. She indicates her pain increased in severity as she returned to standing. She denies numbness/tingling, radiating pain, bowel/bladder incontinence. When asked where she hurts she gestures across her low back. She denies recent falls or trauma.   How long: Patient has been experiencing pain for the past 4-5 months.  What makes it better: Patient has been unable to find anything to improve her pain at this time.   What makes it worse: Patient admits to increased pain with sitting and standing.   Does it radiate: Denies radiating pain.   Numbness/tingling down legs: Denies numbness/tingling down legs.   Attempted treatments: Patient states she purchased a new mattress but has not appreciated improvement in her symptoms with this. She denies taking medications for this problem. Denies heat or ice. Denies physical therapy and denies history of back injection/surgery.   Pain score: 2/10  Any mechanical symptoms: Denies mechanical symptoms.   Feelings of instability: Denies feelings of instability.   Problems with ADLs: Admits to this problem affecting her ability to perform ADLs.     REVIEW OF SYSTEMS:   Constitution: Patient denies fever, chills, night sweats, and weight changes.  Eyes:  Patient denies eye pain or vision changes.  HENT: Patient denies headache, ear pain, sore throat, or nasal discharge.  CVS: Patient denies chest pain.  Lungs: Patient denies shortness of breath or cough.  Abd: Patient denies stomach pain, nausea, or vomiting.  Skin: Patient denies skin rash or itching.    Hematologic/Lymphatic: Patient denies easy bruising.   Musculoskeletal: Patient denies recent falls. See HPI.  Psych: Patient denies any current anxiety or nervousness.    PAST MEDICAL HISTORY:   Past Medical History:   Diagnosis Date    Acute hepatitis C without mention of hepatic coma(070.51)     Endometriosis, site unspecified     Viral hepatitis C 6/18/2016    Viral hepatitis C 6/18/2016       PAST SURGICAL HISTORY:   Past Surgical History:   Procedure Laterality Date    Dx scope  10/16/2012    TDP hysteroscope    HYSTEROSCOPY      endometriosis    LASER LAPAROSCOPY      NASAL SEPTUM SURGERY         FAMILY HISTORY:   Family History   Problem Relation Age of Onset    Hypertension Mother     Heart disease Mother         murmur    Hypertension Father     Heart disease Paternal Grandmother     Breast cancer Neg Hx     Colon cancer Neg Hx     Ovarian cancer Neg Hx        SOCIAL HISTORY:   Social History     Socioeconomic History    Marital status:      Spouse name: Not on file    Number of children: Not on file    Years of education: Not on file    Highest education level: Not on file   Occupational History    Not on file   Social Needs    Financial resource strain: Not hard at all    Food insecurity     Worry: Never true     Inability: Never true    Transportation needs     Medical: No     Non-medical: No   Tobacco Use    Smoking status: Never Smoker    Smokeless tobacco: Never Used   Substance and Sexual Activity    Alcohol use: No     Alcohol/week: 0.0 standard drinks     Frequency: Never     Drinks per session: Patient refused     Binge frequency: Never    Drug use: No     Sexual activity: Yes     Partners: Male     Birth control/protection: None   Lifestyle    Physical activity     Days per week: 1 day     Minutes per session: Not on file    Stress: Only a little   Relationships    Social connections     Talks on phone: More than three times a week     Gets together: Once a week     Attends Gnosticist service: Not on file     Active member of club or organization: Yes     Attends meetings of clubs or organizations: More than 4 times per year     Relationship status:    Other Topics Concern    Not on file   Social History Narrative     at [employer],  (HT[employer]), 2 step kids, 20, 18.       MEDICATIONS:     Current Outpatient Medications:     albuterol 90 mcg/actuation inhaler, Inhale 1-2 puffs into the lungs every 6 (six) hours as needed for Wheezing., Disp: 1 Inhaler, Rfl: 0    fexofenadine (ALLEGRA) 180 MG tablet, Take 1 tablet (180 mg total) by mouth once daily., Disp: 30 tablet, Rfl: 6    flu vacc [id number] 6mos up,PF, (FLUARIX QUAD [id number], PF,) 60 mcg (15 mcg x 4)/0.5 mL Syrg, Inject 0.5 mLs into the muscle. (Patient not taking: Reported on 12/11/2019), Disp: 0.5 mL, Rfl: 0    fluticasone propionate (FLONASE) 50 mcg/actuation nasal spray, 2 sprays (100 mcg total) by Each Nostril route once daily., Disp: 16 g, Rfl: 6    meloxicam (MOBIC) 15 MG tablet, Take 1 tablet (15 mg total) by mouth once daily. (Patient not taking: Reported on 12/11/2019), Disp: 30 tablet, Rfl: 2    mupirocin (BACTROBAN) 2 % ointment, Apply to affected area 3 times daily, Disp: 22 g, Rfl: 1    ofloxacin (FLOXIN) 0.3 % otic solution, Place 5 drops into the left ear once daily., Disp: 5 mL, Rfl: 0    ALLERGIES:   Review of patient's allergies indicates:   Allergen Reactions    Hay fever and allergy relief Hives and Itching        PHYSICAL EXAMINATION:  There were no vitals taken for this visit.  Vitals signs and nursing note have been reviewed.  General: In no acute  distress, well developed, well nourished, no diaphoresis  Eyes: EOM full and smooth, no eye redness or discharge  HENT: normocephalic and atraumatic, neck supple, trachea midline, no nasal discharge, no external ear redness or discharge  Cardiovascular: no LE edema  Lungs: respirations non-labored, no conversational dyspnea   Abd: non-distended, no rigidity  MSK: no amputation or deformity, no swelling of extremities  Neuro: AAOx3, CN2-12 grossly intact  Skin: No rashes, warm and dry  Psychiatric: cooperative, pleasant, mood and affect appropriate for age    Lumbar Spine: lumbar region    Observation:    Normal cervicothoracolumbar curves.    No obvious pelvic obliquity while standing.    No edema, erythema, or ecchymosis noted in lumbosacral region.    No midline skin abnormalities.    No atrophy of lower limb musculature.    Tenderness:  No tenderness throughout the lumbar spine, iliolumbar region, posterior pelvis.  Mild tenderness over the left sacral base  No tenderness over the piriformis, greater/lesser trochanters.  No bony deformities or step-offs palpated.     Range of Motion:  Active flexion to 60°.   Active extension to 25°.   Active rotation to 30° on left and 30° on right.    Active sidebending to 25° on left and 25° on right.  Passive hip flexion to 135° on left and 135° on right.    Passive hip internal rotation to 45° on left and 45° on right.    Passive hip external rotation to 45° on left and 45° on right.    No pain with range of motion testing.    Strength Testing:  Hip flexion - 5/5 on left and 5/5 on right  Hip extension - 5/5 on left and 5/5 on right  Knee flexion - 5/5 on left and 5/5 on right  Knee extension - 5/5 on left and 5/5 on right  Dorsiflexion - 5/5 on left and 5/5 on right  Plantarflexion - 5/5 on left and 5/5 on right  Great toe extension - 5/5 on left and 5/5 on right    Special Tests:  Standing Aleman's test - negative on left and negative on right  Stork test - negative on left  and negative on right    Seated straight leg raise - negative on left and negative on right  Supine straight leg raise - negative on left and negative on right  Slump test - negative on left and negative on right  Provocation maneuvers exhibit no worsening of symptoms on left or on right.    MANNY test - negative  FADIR test - negative  Log roll test - negative    Posture:  Upright  Gait: Non-antalgic with Neutral ankle mechanics     TART (Tissue texture abnormality, Asymmetry,  Restriction of motion and/or Tenderness) changes:     Cervical Spine  Thoracic Spine  Lumbar Spine   C1  T1 Neutral L1 Neutral   C2  T2 Neutral L2 Neutral   C3  T3 Neutral L3 Neutral   C4  T4 Neutral L4 Neutral   C5  T5 Neutral L5 FRSR   C6  T6 Neutral     C7  T7 Neutral       T8 Neutral       T9 Neutral       T10 Neutral       T11 Neutral       T12 Neutral       Ribs:    Upper Extremity:    Abdomen:    Pelvis:  · Innominate:Left superior shear  · Pubic bone:Left superior pubic shear    Sacrum:Left on Right sacral torsion     Lower Extremity:      Key   F= Flexed   E = Extended   R = Rotated   S = Sidebent   TTA = tissue texture abnormality       Neurovascular Exam:  Reflexes +2/4 and symmetric at the L4 and S1 dermatomes.    Sensation intact to light touch in the L2-S2 dermatomes bilaterally.   No pretibial edema or abnormal hair pattern of the shin.    Intact and symmetric DP and PT pulses bilaterally.  Normal gait without trendelenberg, heel walking, toe walking, and tandem walking.      ASSESSMENT:      ICD-10-CM ICD-9-CM   1. Sacral back pain  M53.3 724.6   2. Somatic dysfunction of lumbar region  M99.03 739.3   3. Somatic dysfunction of pelvis region  M99.05 739.5   4. Somatic dysfunction of sacral region  M99.04 739.4       PLAN:  1. 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 She admits to at least 75% improvement in her back pain following OMT  at her initial visit. She admits to compliance with her HEP.     - Based on her description of pain/body language and somatic dysfunction identified on exam, I discussed osteopathic manipulation as a treatment option today. She consents to evaluation and treatment. See below.     - Continue with previously prescribed HEP for basic core.      2-4.  Somatic dysfunction of lumbar, pelvis, sacral regions -     - OMT 3-4 regions. Oral consent obtained. Reviewed benefits and potential side effects. OMT indicated today due to signs and symptoms as well as local and remote somatic dysfunction findings and their related neurokinetic, lymphatic, fascial and/or arteriovenous body connections. OMT techniques used: Myofascial Release, Muscle Energy and High Velocity Low Amplitude. Treatment was tolerated well. Improvement noted in segmental mobility post-treatment in dysfunctional regions. There were no adverse events and no complications immediately following treatment. Advised plenty of water to help alleviate soreness.      Future planning includes - possibly more OMT if helpful and if indicated    All questions were answered to the best of my ability and all concerns were addressed at this time.    Follow up as needed.      This note is dictated using the M*Modal Fluency Direct word recognition program. There are word recognition mistakes that are occasionally missed on review.

## 2020-10-22 ENCOUNTER — PATIENT MESSAGE (OUTPATIENT)
Dept: SPORTS MEDICINE | Facility: CLINIC | Age: 45
End: 2020-10-22

## 2021-01-22 ENCOUNTER — CLINICAL SUPPORT (OUTPATIENT)
Dept: OTHER | Facility: CLINIC | Age: 46
End: 2021-01-22
Payer: COMMERCIAL

## 2021-01-22 DIAGNOSIS — Z00.8 ENCOUNTER FOR OTHER GENERAL EXAMINATION: ICD-10-CM

## 2021-01-23 VITALS — BODY MASS INDEX: 24.63 KG/M2 | HEIGHT: 68 IN

## 2021-01-23 LAB
GLUCOSE SERPL-MCNC: 81 MG/DL (ref 60–140)
HDLC SERPL-MCNC: 52 MG/DL
POC CHOLESTEROL, LDL (DOCK): 118 MG/DL
POC CHOLESTEROL, TOTAL: 181 MG/DL
TRIGL SERPL-MCNC: 56 MG/DL

## 2021-01-26 ENCOUNTER — PATIENT MESSAGE (OUTPATIENT)
Dept: SPORTS MEDICINE | Facility: CLINIC | Age: 46
End: 2021-01-26

## 2021-02-04 ENCOUNTER — TELEPHONE (OUTPATIENT)
Dept: INTERNAL MEDICINE | Facility: CLINIC | Age: 46
End: 2021-02-04

## 2021-02-04 DIAGNOSIS — Z00.00 ROUTINE GENERAL MEDICAL EXAMINATION AT A HEALTH CARE FACILITY: Primary | ICD-10-CM

## 2021-02-05 ENCOUNTER — PATIENT OUTREACH (OUTPATIENT)
Dept: ADMINISTRATIVE | Facility: HOSPITAL | Age: 46
End: 2021-02-05

## 2021-02-05 ENCOUNTER — PATIENT MESSAGE (OUTPATIENT)
Dept: ADMINISTRATIVE | Facility: HOSPITAL | Age: 46
End: 2021-02-05

## 2021-04-30 ENCOUNTER — PATIENT MESSAGE (OUTPATIENT)
Dept: ADMINISTRATIVE | Facility: HOSPITAL | Age: 46
End: 2021-04-30

## 2021-04-30 ENCOUNTER — PATIENT OUTREACH (OUTPATIENT)
Dept: ADMINISTRATIVE | Facility: HOSPITAL | Age: 46
End: 2021-04-30

## 2021-07-06 ENCOUNTER — PATIENT MESSAGE (OUTPATIENT)
Dept: ADMINISTRATIVE | Facility: HOSPITAL | Age: 46
End: 2021-07-06

## 2021-09-15 ENCOUNTER — HOSPITAL ENCOUNTER (OUTPATIENT)
Dept: RADIOLOGY | Facility: HOSPITAL | Age: 46
Discharge: HOME | End: 2021-09-15
Attending: ORTHOPAEDIC SURGERY
Payer: COMMERCIAL

## 2021-09-15 ENCOUNTER — OFFICE VISIT (OUTPATIENT)
Dept: SPORTS MEDICINE | Facility: CLINIC | Age: 46
End: 2021-09-15
Payer: COMMERCIAL

## 2021-09-15 VITALS
WEIGHT: 162 LBS | SYSTOLIC BLOOD PRESSURE: 112 MMHG | HEIGHT: 68 IN | BODY MASS INDEX: 24.55 KG/M2 | HEART RATE: 71 BPM | DIASTOLIC BLOOD PRESSURE: 69 MMHG

## 2021-09-15 DIAGNOSIS — M99.05 SOMATIC DYSFUNCTION OF PELVIS REGION: ICD-10-CM

## 2021-09-15 DIAGNOSIS — M99.04 SOMATIC DYSFUNCTION OF SACRAL REGION: ICD-10-CM

## 2021-09-15 DIAGNOSIS — M54.50 DORSALGIA OF LUMBAR REGION: Primary | ICD-10-CM

## 2021-09-15 DIAGNOSIS — M99.03 SOMATIC DYSFUNCTION OF LUMBAR REGION: ICD-10-CM

## 2021-09-15 DIAGNOSIS — M54.9 DORSALGIA, UNSPECIFIED: ICD-10-CM

## 2021-09-15 PROCEDURE — 99999 PR PBB SHADOW E&M-EST. PATIENT-LVL III: CPT | Mod: PBBFAC,,, | Performed by: ORTHOPAEDIC SURGERY

## 2021-09-15 PROCEDURE — 3078F PR MOST RECENT DIASTOLIC BLOOD PRESSURE < 80 MM HG: ICD-10-PCS | Mod: CPTII,S$GLB,, | Performed by: ORTHOPAEDIC SURGERY

## 2021-09-15 PROCEDURE — 3008F BODY MASS INDEX DOCD: CPT | Mod: CPTII,S$GLB,, | Performed by: ORTHOPAEDIC SURGERY

## 2021-09-15 PROCEDURE — 3008F PR BODY MASS INDEX (BMI) DOCUMENTED: ICD-10-PCS | Mod: CPTII,S$GLB,, | Performed by: ORTHOPAEDIC SURGERY

## 2021-09-15 PROCEDURE — 1160F RVW MEDS BY RX/DR IN RCRD: CPT | Mod: CPTII,S$GLB,, | Performed by: ORTHOPAEDIC SURGERY

## 2021-09-15 PROCEDURE — 98926 OSTEOPATH MANJ 3-4 REGIONS: CPT | Mod: S$GLB,,, | Performed by: ORTHOPAEDIC SURGERY

## 2021-09-15 PROCEDURE — 3074F SYST BP LT 130 MM HG: CPT | Mod: CPTII,S$GLB,, | Performed by: ORTHOPAEDIC SURGERY

## 2021-09-15 PROCEDURE — 72110 X-RAY EXAM L-2 SPINE 4/>VWS: CPT | Mod: TC

## 2021-09-15 PROCEDURE — 1160F PR REVIEW ALL MEDS BY PRESCRIBER/CLIN PHARMACIST DOCUMENTED: ICD-10-PCS | Mod: CPTII,S$GLB,, | Performed by: ORTHOPAEDIC SURGERY

## 2021-09-15 PROCEDURE — 99999 PR PBB SHADOW E&M-EST. PATIENT-LVL III: ICD-10-PCS | Mod: PBBFAC,,, | Performed by: ORTHOPAEDIC SURGERY

## 2021-09-15 PROCEDURE — 3078F DIAST BP <80 MM HG: CPT | Mod: CPTII,S$GLB,, | Performed by: ORTHOPAEDIC SURGERY

## 2021-09-15 PROCEDURE — 72110 XR LUMBAR SPINE COMPLETE 5 VIEW: ICD-10-PCS | Mod: 26,,, | Performed by: RADIOLOGY

## 2021-09-15 PROCEDURE — 99214 OFFICE O/P EST MOD 30 MIN: CPT | Mod: 25,S$GLB,, | Performed by: ORTHOPAEDIC SURGERY

## 2021-09-15 PROCEDURE — 72110 X-RAY EXAM L-2 SPINE 4/>VWS: CPT | Mod: 26,,, | Performed by: RADIOLOGY

## 2021-09-15 PROCEDURE — 1159F MED LIST DOCD IN RCRD: CPT | Mod: CPTII,S$GLB,, | Performed by: ORTHOPAEDIC SURGERY

## 2021-09-15 PROCEDURE — 1159F PR MEDICATION LIST DOCUMENTED IN MEDICAL RECORD: ICD-10-PCS | Mod: CPTII,S$GLB,, | Performed by: ORTHOPAEDIC SURGERY

## 2021-09-15 PROCEDURE — 3074F PR MOST RECENT SYSTOLIC BLOOD PRESSURE < 130 MM HG: ICD-10-PCS | Mod: CPTII,S$GLB,, | Performed by: ORTHOPAEDIC SURGERY

## 2021-09-15 PROCEDURE — 98926 PR OSTEOPATHIC MANIP,3-4 BODY REGN: ICD-10-PCS | Mod: S$GLB,,, | Performed by: ORTHOPAEDIC SURGERY

## 2021-09-15 PROCEDURE — 99214 PR OFFICE/OUTPT VISIT, EST, LEVL IV, 30-39 MIN: ICD-10-PCS | Mod: 25,S$GLB,, | Performed by: ORTHOPAEDIC SURGERY

## 2021-09-15 RX ORDER — MELOXICAM 15 MG/1
15 TABLET ORAL DAILY
Qty: 30 TABLET | Refills: 0 | Status: SHIPPED | OUTPATIENT
Start: 2021-09-15 | End: 2021-09-15

## 2021-10-04 ENCOUNTER — PATIENT MESSAGE (OUTPATIENT)
Dept: ADMINISTRATIVE | Facility: HOSPITAL | Age: 46
End: 2021-10-04

## 2021-11-18 ENCOUNTER — OFFICE VISIT (OUTPATIENT)
Dept: URGENT CARE | Facility: CLINIC | Age: 46
End: 2021-11-18
Payer: COMMERCIAL

## 2021-11-18 ENCOUNTER — TELEPHONE (OUTPATIENT)
Dept: OBSTETRICS AND GYNECOLOGY | Facility: CLINIC | Age: 46
End: 2021-11-18
Payer: COMMERCIAL

## 2021-11-18 ENCOUNTER — TELEPHONE (OUTPATIENT)
Dept: INTERNAL MEDICINE | Facility: CLINIC | Age: 46
End: 2021-11-18
Payer: COMMERCIAL

## 2021-11-18 VITALS
SYSTOLIC BLOOD PRESSURE: 123 MMHG | RESPIRATION RATE: 16 BRPM | DIASTOLIC BLOOD PRESSURE: 80 MMHG | BODY MASS INDEX: 25.76 KG/M2 | HEIGHT: 68 IN | TEMPERATURE: 97 F | HEART RATE: 87 BPM | OXYGEN SATURATION: 97 % | WEIGHT: 170 LBS

## 2021-11-18 DIAGNOSIS — Z12.31 ENCOUNTER FOR SCREENING MAMMOGRAM FOR MALIGNANT NEOPLASM OF BREAST: Primary | ICD-10-CM

## 2021-11-18 DIAGNOSIS — J30.2 SEASONAL ALLERGIES: ICD-10-CM

## 2021-11-18 DIAGNOSIS — R60.9 MUCOSAL EDEMA: ICD-10-CM

## 2021-11-18 DIAGNOSIS — R09.81 NASAL CONGESTION: Primary | ICD-10-CM

## 2021-11-18 LAB
CTP QC/QA: YES
SARS-COV-2 RDRP RESP QL NAA+PROBE: NEGATIVE

## 2021-11-18 PROCEDURE — 3074F PR MOST RECENT SYSTOLIC BLOOD PRESSURE < 130 MM HG: ICD-10-PCS | Mod: CPTII,S$GLB,,

## 2021-11-18 PROCEDURE — 1160F PR REVIEW ALL MEDS BY PRESCRIBER/CLIN PHARMACIST DOCUMENTED: ICD-10-PCS | Mod: CPTII,S$GLB,,

## 2021-11-18 PROCEDURE — 3008F BODY MASS INDEX DOCD: CPT | Mod: CPTII,S$GLB,,

## 2021-11-18 PROCEDURE — 1160F RVW MEDS BY RX/DR IN RCRD: CPT | Mod: CPTII,S$GLB,,

## 2021-11-18 PROCEDURE — U0002 COVID-19 LAB TEST NON-CDC: HCPCS | Mod: QW,S$GLB,,

## 2021-11-18 PROCEDURE — 3079F DIAST BP 80-89 MM HG: CPT | Mod: CPTII,S$GLB,,

## 2021-11-18 PROCEDURE — 99213 OFFICE O/P EST LOW 20 MIN: CPT | Mod: S$GLB,,,

## 2021-11-18 PROCEDURE — 3008F PR BODY MASS INDEX (BMI) DOCUMENTED: ICD-10-PCS | Mod: CPTII,S$GLB,,

## 2021-11-18 PROCEDURE — 1159F MED LIST DOCD IN RCRD: CPT | Mod: CPTII,S$GLB,,

## 2021-11-18 PROCEDURE — 3074F SYST BP LT 130 MM HG: CPT | Mod: CPTII,S$GLB,,

## 2021-11-18 PROCEDURE — 99213 PR OFFICE/OUTPT VISIT, EST, LEVL III, 20-29 MIN: ICD-10-PCS | Mod: S$GLB,,,

## 2021-11-18 PROCEDURE — 1159F PR MEDICATION LIST DOCUMENTED IN MEDICAL RECORD: ICD-10-PCS | Mod: CPTII,S$GLB,,

## 2021-11-18 PROCEDURE — U0002: ICD-10-PCS | Mod: QW,S$GLB,,

## 2021-11-18 PROCEDURE — 3079F PR MOST RECENT DIASTOLIC BLOOD PRESSURE 80-89 MM HG: ICD-10-PCS | Mod: CPTII,S$GLB,,

## 2021-11-18 RX ORDER — BROMPHENIRAMINE MALEATE, PSEUDOEPHEDRINE HYDROCHLORIDE, AND DEXTROMETHORPHAN HYDROBROMIDE 2; 30; 10 MG/5ML; MG/5ML; MG/5ML
10 SYRUP ORAL EVERY 4 HOURS PRN
Qty: 118 ML | Refills: 0 | Status: SHIPPED | OUTPATIENT
Start: 2021-11-18 | End: 2021-11-28

## 2021-11-18 RX ORDER — AZELASTINE 1 MG/ML
1 SPRAY, METERED NASAL 2 TIMES DAILY
Qty: 30 ML | Refills: 0 | Status: SHIPPED | OUTPATIENT
Start: 2021-11-18 | End: 2022-01-04 | Stop reason: SDUPTHER

## 2021-12-01 ENCOUNTER — OFFICE VISIT (OUTPATIENT)
Dept: OTOLARYNGOLOGY | Facility: CLINIC | Age: 46
End: 2021-12-01
Payer: COMMERCIAL

## 2021-12-01 ENCOUNTER — TELEPHONE (OUTPATIENT)
Dept: OTOLARYNGOLOGY | Facility: CLINIC | Age: 46
End: 2021-12-01
Payer: COMMERCIAL

## 2021-12-01 DIAGNOSIS — J32.8 OTHER CHRONIC SINUSITIS: Primary | ICD-10-CM

## 2021-12-01 DIAGNOSIS — H61.22 IMPACTED CERUMEN OF LEFT EAR: ICD-10-CM

## 2021-12-01 DIAGNOSIS — J31.0 CHRONIC RHINITIS: ICD-10-CM

## 2021-12-01 DIAGNOSIS — Q17.8 SPLIT EAR LOBE: ICD-10-CM

## 2021-12-01 DIAGNOSIS — R09.82 POST-NASAL DRIP: ICD-10-CM

## 2021-12-01 PROCEDURE — 99999 PR PBB SHADOW E&M-EST. PATIENT-LVL III: CPT | Mod: PBBFAC,,, | Performed by: NURSE PRACTITIONER

## 2021-12-01 PROCEDURE — 99999 PR PBB SHADOW E&M-EST. PATIENT-LVL III: ICD-10-PCS | Mod: PBBFAC,,, | Performed by: NURSE PRACTITIONER

## 2021-12-01 PROCEDURE — 69210 REMOVE IMPACTED EAR WAX UNI: CPT | Mod: 51,S$GLB,, | Performed by: NURSE PRACTITIONER

## 2021-12-01 PROCEDURE — 69210 EAR CERUMEN REMOVAL: ICD-10-PCS | Mod: 51,S$GLB,, | Performed by: NURSE PRACTITIONER

## 2021-12-01 PROCEDURE — 31231 NASAL/SINUS ENDOSCOPY: ICD-10-PCS | Mod: S$GLB,,, | Performed by: NURSE PRACTITIONER

## 2021-12-01 PROCEDURE — 99214 OFFICE O/P EST MOD 30 MIN: CPT | Mod: 25,S$GLB,, | Performed by: NURSE PRACTITIONER

## 2021-12-01 PROCEDURE — 31231 NASAL ENDOSCOPY DX: CPT | Mod: S$GLB,,, | Performed by: NURSE PRACTITIONER

## 2021-12-01 PROCEDURE — 99214 PR OFFICE/OUTPT VISIT, EST, LEVL IV, 30-39 MIN: ICD-10-PCS | Mod: 25,S$GLB,, | Performed by: NURSE PRACTITIONER

## 2021-12-02 ENCOUNTER — OFFICE VISIT (OUTPATIENT)
Dept: OTOLARYNGOLOGY | Facility: CLINIC | Age: 46
End: 2021-12-02
Payer: COMMERCIAL

## 2021-12-02 VITALS
BODY MASS INDEX: 25.79 KG/M2 | SYSTOLIC BLOOD PRESSURE: 112 MMHG | WEIGHT: 170.19 LBS | HEIGHT: 68 IN | TEMPERATURE: 98 F | HEART RATE: 78 BPM | RESPIRATION RATE: 20 BRPM | DIASTOLIC BLOOD PRESSURE: 68 MMHG | OXYGEN SATURATION: 97 %

## 2021-12-02 DIAGNOSIS — J31.0 CHRONIC RHINITIS: ICD-10-CM

## 2021-12-02 DIAGNOSIS — Q17.8 SPLIT EAR LOBE: Primary | ICD-10-CM

## 2021-12-02 PROCEDURE — 99214 PR OFFICE/OUTPT VISIT, EST, LEVL IV, 30-39 MIN: ICD-10-PCS | Mod: S$GLB,,, | Performed by: OTOLARYNGOLOGY

## 2021-12-02 PROCEDURE — 99214 OFFICE O/P EST MOD 30 MIN: CPT | Mod: S$GLB,,, | Performed by: OTOLARYNGOLOGY

## 2021-12-06 ENCOUNTER — OFFICE VISIT (OUTPATIENT)
Dept: OBSTETRICS AND GYNECOLOGY | Facility: CLINIC | Age: 46
End: 2021-12-06
Payer: COMMERCIAL

## 2021-12-06 VITALS
SYSTOLIC BLOOD PRESSURE: 114 MMHG | BODY MASS INDEX: 25.95 KG/M2 | WEIGHT: 170.63 LBS | DIASTOLIC BLOOD PRESSURE: 72 MMHG

## 2021-12-06 DIAGNOSIS — N94.6 DYSMENORRHEA: ICD-10-CM

## 2021-12-06 DIAGNOSIS — Z87.42 HISTORY OF ENDOMETRIOSIS: ICD-10-CM

## 2021-12-06 DIAGNOSIS — Z11.51 SCREENING FOR HPV (HUMAN PAPILLOMAVIRUS): ICD-10-CM

## 2021-12-06 DIAGNOSIS — Z12.4 ENCOUNTER FOR PAPANICOLAOU SMEAR FOR CERVICAL CANCER SCREENING: ICD-10-CM

## 2021-12-06 DIAGNOSIS — Z01.419 WOMEN'S ANNUAL ROUTINE GYNECOLOGICAL EXAMINATION: Primary | ICD-10-CM

## 2021-12-06 DIAGNOSIS — B00.9 HSV INFECTION: ICD-10-CM

## 2021-12-06 PROCEDURE — 99396 PR PREVENTIVE VISIT,EST,40-64: ICD-10-PCS | Mod: S$GLB,,, | Performed by: NURSE PRACTITIONER

## 2021-12-06 PROCEDURE — 88141 CYTOPATH C/V INTERPRET: CPT | Mod: ,,, | Performed by: PATHOLOGY

## 2021-12-06 PROCEDURE — 99999 PR PBB SHADOW E&M-EST. PATIENT-LVL III: CPT | Mod: PBBFAC,,, | Performed by: NURSE PRACTITIONER

## 2021-12-06 PROCEDURE — 88175 CYTOPATH C/V AUTO FLUID REDO: CPT | Performed by: PATHOLOGY

## 2021-12-06 PROCEDURE — 99999 PR PBB SHADOW E&M-EST. PATIENT-LVL III: ICD-10-PCS | Mod: PBBFAC,,, | Performed by: NURSE PRACTITIONER

## 2021-12-06 PROCEDURE — 88141 PR  CYTOPATH CERV/VAG INTERPRET: ICD-10-PCS | Mod: ,,, | Performed by: PATHOLOGY

## 2021-12-06 PROCEDURE — 99396 PREV VISIT EST AGE 40-64: CPT | Mod: S$GLB,,, | Performed by: NURSE PRACTITIONER

## 2021-12-06 PROCEDURE — 87624 HPV HI-RISK TYP POOLED RSLT: CPT | Performed by: NURSE PRACTITIONER

## 2021-12-06 RX ORDER — MINERAL OIL
ENEMA (ML) RECTAL
COMMUNITY
End: 2023-04-11 | Stop reason: SDUPTHER

## 2021-12-06 RX ORDER — IBUPROFEN 800 MG/1
800 TABLET ORAL EVERY 8 HOURS PRN
Qty: 60 TABLET | Refills: 2 | Status: SHIPPED | OUTPATIENT
Start: 2021-12-06 | End: 2022-12-06

## 2021-12-06 RX ORDER — VALACYCLOVIR HYDROCHLORIDE 1 G/1
1000 TABLET, FILM COATED ORAL EVERY 12 HOURS
Qty: 20 TABLET | Refills: 6 | Status: SHIPPED | OUTPATIENT
Start: 2021-12-06 | End: 2021-12-16

## 2021-12-13 LAB
FINAL PATHOLOGIC DIAGNOSIS: ABNORMAL
Lab: ABNORMAL

## 2021-12-14 ENCOUNTER — PATIENT MESSAGE (OUTPATIENT)
Dept: OBSTETRICS AND GYNECOLOGY | Facility: CLINIC | Age: 46
End: 2021-12-14
Payer: COMMERCIAL

## 2021-12-14 LAB
HPV HR 12 DNA SPEC QL NAA+PROBE: NEGATIVE
HPV16 AG SPEC QL: NEGATIVE
HPV18 DNA SPEC QL NAA+PROBE: NEGATIVE

## 2021-12-17 ENCOUNTER — PROCEDURE VISIT (OUTPATIENT)
Dept: OTOLARYNGOLOGY | Facility: CLINIC | Age: 46
End: 2021-12-17
Payer: COMMERCIAL

## 2021-12-17 VITALS
WEIGHT: 170.63 LBS | SYSTOLIC BLOOD PRESSURE: 118 MMHG | TEMPERATURE: 97 F | HEART RATE: 67 BPM | DIASTOLIC BLOOD PRESSURE: 81 MMHG | HEIGHT: 68 IN | BODY MASS INDEX: 25.86 KG/M2 | OXYGEN SATURATION: 99 %

## 2021-12-17 DIAGNOSIS — S01.319S: ICD-10-CM

## 2021-12-17 PROCEDURE — 12051 PR INTERMED WOUND REPAIR FACE/EAR/EYELID/NOSE/LIP/MUC MEBR, 2.5CM OR LESS: ICD-10-PCS | Mod: 59,S$GLB,, | Performed by: OTOLARYNGOLOGY

## 2021-12-17 PROCEDURE — 12051 INTMD RPR FACE/MM 2.5 CM/<: CPT | Mod: S$GLB,,, | Performed by: OTOLARYNGOLOGY

## 2021-12-27 ENCOUNTER — TELEPHONE (OUTPATIENT)
Dept: OTOLARYNGOLOGY | Facility: CLINIC | Age: 46
End: 2021-12-27
Payer: COMMERCIAL

## 2021-12-30 ENCOUNTER — OFFICE VISIT (OUTPATIENT)
Dept: OTOLARYNGOLOGY | Facility: CLINIC | Age: 46
End: 2021-12-30
Payer: COMMERCIAL

## 2021-12-30 ENCOUNTER — HOSPITAL ENCOUNTER (OUTPATIENT)
Dept: RADIOLOGY | Facility: HOSPITAL | Age: 46
Discharge: HOME | End: 2021-12-30
Attending: INTERNAL MEDICINE
Payer: COMMERCIAL

## 2021-12-30 VITALS
WEIGHT: 173.69 LBS | BODY MASS INDEX: 26.41 KG/M2 | SYSTOLIC BLOOD PRESSURE: 120 MMHG | HEART RATE: 72 BPM | TEMPERATURE: 98 F | DIASTOLIC BLOOD PRESSURE: 70 MMHG

## 2021-12-30 DIAGNOSIS — Z12.31 ENCOUNTER FOR SCREENING MAMMOGRAM FOR MALIGNANT NEOPLASM OF BREAST: ICD-10-CM

## 2021-12-30 DIAGNOSIS — S01.319S: Primary | ICD-10-CM

## 2021-12-30 DIAGNOSIS — Q17.8 SPLIT EAR LOBE: ICD-10-CM

## 2021-12-30 PROCEDURE — 3078F PR MOST RECENT DIASTOLIC BLOOD PRESSURE < 80 MM HG: ICD-10-PCS | Mod: CPTII,S$GLB,, | Performed by: OTOLARYNGOLOGY

## 2021-12-30 PROCEDURE — 77067 MAMMO DIGITAL SCREENING BILAT WITH TOMO: ICD-10-PCS | Mod: 26,,, | Performed by: RADIOLOGY

## 2021-12-30 PROCEDURE — 1159F PR MEDICATION LIST DOCUMENTED IN MEDICAL RECORD: ICD-10-PCS | Mod: CPTII,S$GLB,, | Performed by: OTOLARYNGOLOGY

## 2021-12-30 PROCEDURE — 3008F PR BODY MASS INDEX (BMI) DOCUMENTED: ICD-10-PCS | Mod: CPTII,S$GLB,, | Performed by: OTOLARYNGOLOGY

## 2021-12-30 PROCEDURE — 77067 SCR MAMMO BI INCL CAD: CPT | Mod: TC

## 2021-12-30 PROCEDURE — 3074F PR MOST RECENT SYSTOLIC BLOOD PRESSURE < 130 MM HG: ICD-10-PCS | Mod: CPTII,S$GLB,, | Performed by: OTOLARYNGOLOGY

## 2021-12-30 PROCEDURE — 3078F DIAST BP <80 MM HG: CPT | Mod: CPTII,S$GLB,, | Performed by: OTOLARYNGOLOGY

## 2021-12-30 PROCEDURE — 3074F SYST BP LT 130 MM HG: CPT | Mod: CPTII,S$GLB,, | Performed by: OTOLARYNGOLOGY

## 2021-12-30 PROCEDURE — 99024 PR POST-OP FOLLOW-UP VISIT: ICD-10-PCS | Mod: S$GLB,,, | Performed by: OTOLARYNGOLOGY

## 2021-12-30 PROCEDURE — 1159F MED LIST DOCD IN RCRD: CPT | Mod: CPTII,S$GLB,, | Performed by: OTOLARYNGOLOGY

## 2021-12-30 PROCEDURE — 3008F BODY MASS INDEX DOCD: CPT | Mod: CPTII,S$GLB,, | Performed by: OTOLARYNGOLOGY

## 2021-12-30 PROCEDURE — 99024 POSTOP FOLLOW-UP VISIT: CPT | Mod: S$GLB,,, | Performed by: OTOLARYNGOLOGY

## 2021-12-30 PROCEDURE — 77063 BREAST TOMOSYNTHESIS BI: CPT | Mod: 26,,, | Performed by: RADIOLOGY

## 2021-12-30 PROCEDURE — 1160F PR REVIEW ALL MEDS BY PRESCRIBER/CLIN PHARMACIST DOCUMENTED: ICD-10-PCS | Mod: CPTII,S$GLB,, | Performed by: OTOLARYNGOLOGY

## 2021-12-30 PROCEDURE — 1160F RVW MEDS BY RX/DR IN RCRD: CPT | Mod: CPTII,S$GLB,, | Performed by: OTOLARYNGOLOGY

## 2021-12-30 PROCEDURE — 77067 SCR MAMMO BI INCL CAD: CPT | Mod: 26,,, | Performed by: RADIOLOGY

## 2021-12-30 PROCEDURE — 77063 MAMMO DIGITAL SCREENING BILAT WITH TOMO: ICD-10-PCS | Mod: 26,,, | Performed by: RADIOLOGY

## 2021-12-30 NOTE — PROGRESS NOTES
Subjective:     Chief Complaint:   Chief Complaint   Patient presents with    Wound Check       Radha Tabor is a 46 y.o. female who present for postoperative visit.     Patient underwent bilateral earlobe repair on 12/17/21.   Since then, she has been doing well without complaints.     Past Medical History  She has a past medical history of Acute hepatitis C without mention of hepatic coma(070.51), Endometriosis, site unspecified, Viral hepatitis C, and Viral hepatitis C.    Past Surgical History  She has a past surgical history that includes Nasal septum surgery; Hysteroscopy; Dx scope (10/16/2012); and Laser laparoscopy.    Family History  Her family history includes Heart disease in her mother and paternal grandmother; Hypertension in her father and mother.    Social History  She reports that she has never smoked. She has never used smokeless tobacco. She reports that she does not drink alcohol and does not use drugs.    Allergies  She is allergic to hay fever and allergy relief.    Medications  She has a current medication list which includes the following prescription(s): azelastine, fexofenadine, ibuprofen, meloxicam, fluticasone propionate, and valacyclovir.       Objective:     /70 (BP Location: Right arm, Patient Position: Sitting, BP Method: Medium (Automatic))   Pulse 72   Temp 97.7 °F (36.5 °C) (Temporal)   Wt 78.8 kg (173 lb 11.2 oz)   LMP 12/15/2021   BMI 26.41 kg/m²        Ears:    Ears:        Procedure    None        Assessment:     1. Tear of earlobe, unspecified laterality, sequela    2. Split ear lobe          Plan:     Patient is doing well postoperatively. We discussed use of Aquaphor or Vaseline for 7 days b.i.d..  She will follow-up in about 6 weeks if needed.  All questions answered patient was encouraged call with any questions or concerns.

## 2022-01-03 ENCOUNTER — OFFICE VISIT (OUTPATIENT)
Dept: INTERNAL MEDICINE | Facility: CLINIC | Age: 47
End: 2022-01-03
Payer: COMMERCIAL

## 2022-01-03 VITALS
BODY MASS INDEX: 26.49 KG/M2 | DIASTOLIC BLOOD PRESSURE: 70 MMHG | HEIGHT: 68 IN | OXYGEN SATURATION: 99 % | RESPIRATION RATE: 18 BRPM | WEIGHT: 174.81 LBS | SYSTOLIC BLOOD PRESSURE: 103 MMHG | HEART RATE: 68 BPM

## 2022-01-03 DIAGNOSIS — Z00.00 ROUTINE GENERAL MEDICAL EXAMINATION AT A HEALTH CARE FACILITY: Primary | ICD-10-CM

## 2022-01-03 DIAGNOSIS — Z20.822 ENCOUNTER FOR LABORATORY TESTING FOR COVID-19 VIRUS: ICD-10-CM

## 2022-01-03 DIAGNOSIS — R05.9 COUGH: ICD-10-CM

## 2022-01-03 DIAGNOSIS — J30.89 PERENNIAL ALLERGIC RHINITIS: ICD-10-CM

## 2022-01-03 DIAGNOSIS — Z12.11 COLON CANCER SCREENING: ICD-10-CM

## 2022-01-03 DIAGNOSIS — Z11.52 ENCOUNTER FOR SCREENING FOR COVID-19: ICD-10-CM

## 2022-01-03 PROCEDURE — 3074F PR MOST RECENT SYSTOLIC BLOOD PRESSURE < 130 MM HG: ICD-10-PCS | Mod: CPTII,S$GLB,, | Performed by: INTERNAL MEDICINE

## 2022-01-03 PROCEDURE — 99396 PREV VISIT EST AGE 40-64: CPT | Mod: S$GLB,,, | Performed by: INTERNAL MEDICINE

## 2022-01-03 PROCEDURE — 99396 PR PREVENTIVE VISIT,EST,40-64: ICD-10-PCS | Mod: S$GLB,,, | Performed by: INTERNAL MEDICINE

## 2022-01-03 PROCEDURE — 3008F BODY MASS INDEX DOCD: CPT | Mod: CPTII,S$GLB,, | Performed by: INTERNAL MEDICINE

## 2022-01-03 PROCEDURE — 3078F DIAST BP <80 MM HG: CPT | Mod: CPTII,S$GLB,, | Performed by: INTERNAL MEDICINE

## 2022-01-03 PROCEDURE — 3078F PR MOST RECENT DIASTOLIC BLOOD PRESSURE < 80 MM HG: ICD-10-PCS | Mod: CPTII,S$GLB,, | Performed by: INTERNAL MEDICINE

## 2022-01-03 PROCEDURE — U0003 INFECTIOUS AGENT DETECTION BY NUCLEIC ACID (DNA OR RNA); SEVERE ACUTE RESPIRATORY SYNDROME CORONAVIRUS 2 (SARS-COV-2) (CORONAVIRUS DISEASE [COVID-19]), AMPLIFIED PROBE TECHNIQUE, MAKING USE OF HIGH THROUGHPUT TECHNOLOGIES AS DESCRIBED BY CMS-2020-01-R: HCPCS | Performed by: INTERNAL MEDICINE

## 2022-01-03 PROCEDURE — 3008F PR BODY MASS INDEX (BMI) DOCUMENTED: ICD-10-PCS | Mod: CPTII,S$GLB,, | Performed by: INTERNAL MEDICINE

## 2022-01-03 PROCEDURE — 99999 PR PBB SHADOW E&M-EST. PATIENT-LVL IV: CPT | Mod: PBBFAC,,, | Performed by: INTERNAL MEDICINE

## 2022-01-03 PROCEDURE — 3074F SYST BP LT 130 MM HG: CPT | Mod: CPTII,S$GLB,, | Performed by: INTERNAL MEDICINE

## 2022-01-03 PROCEDURE — 99999 PR PBB SHADOW E&M-EST. PATIENT-LVL IV: ICD-10-PCS | Mod: PBBFAC,,, | Performed by: INTERNAL MEDICINE

## 2022-01-03 NOTE — PROGRESS NOTES
Subjective:       Patient ID: Radha Tabor is a 46 y.o. female.    Chief Complaint: Annual Exam (Fasting )    Here for annual exam    Chronic mild back pains, not getting btr. No radicular pains. Pt denies f/c/ns/wt loss, no fecal incontinence or difficulty with retained urine, no hematuria, no dysuria, no perianal anesthesia, no focal weakness or loss of sensation in feet or legs.    Chronic sinus drip/allergies. Mucous btr on new nasal spray, but still nose runs daily.    Review of Systems   Constitutional: Negative for activity change and unexpected weight change.   HENT: Positive for rhinorrhea. Negative for hearing loss and trouble swallowing.    Eyes: Positive for visual disturbance. Negative for discharge.   Respiratory: Negative for chest tightness and wheezing.    Cardiovascular: Negative for chest pain and palpitations.   Gastrointestinal: Negative for blood in stool, constipation, diarrhea and vomiting.   Endocrine: Negative for polydipsia and polyuria.   Genitourinary: Negative for difficulty urinating, dysuria, hematuria and menstrual problem.   Musculoskeletal: Negative for arthralgias, joint swelling and neck pain.   Neurological: Negative for weakness and headaches.   Psychiatric/Behavioral: Negative for confusion and dysphoric mood.           Objective:      Physical Exam  Vitals reviewed.   Constitutional:       General: She is not in acute distress.     Appearance: Normal appearance. She is well-developed. She is not ill-appearing, toxic-appearing or diaphoretic.   HENT:      Head: Normocephalic and atraumatic.   Eyes:      General: No scleral icterus.     Pupils: Pupils are equal, round, and reactive to light.   Neck:      Thyroid: No thyromegaly.   Cardiovascular:      Rate and Rhythm: Normal rate and regular rhythm.      Heart sounds: Normal heart sounds. No murmur heard.  No friction rub. No gallop.    Pulmonary:      Effort: Pulmonary effort is normal. No respiratory distress.      Breath  sounds: Normal breath sounds. No wheezing or rales.   Abdominal:      General: Bowel sounds are normal. There is no distension.      Palpations: Abdomen is soft. There is no mass.      Tenderness: There is no abdominal tenderness. There is no guarding or rebound.   Musculoskeletal:         General: No tenderness. Normal range of motion.      Cervical back: Normal range of motion.      Comments: able to heel and tippie toe walk w/o any difficulty.  No midline spine tenderness to deep palpation  Neg SLR bilat   Lymphadenopathy:      Cervical: No cervical adenopathy.   Neurological:      General: No focal deficit present.      Mental Status: She is alert and oriented to person, place, and time.   Psychiatric:         Mood and Affect: Mood normal.         Speech: Speech normal.         Behavior: Behavior normal.         Assessment:       1. Routine general medical examination at a health care facility    2. Colon cancer screening    3. Perennial allergic rhinitis    4. Encounter for screening for COVID-19    5. Cough    6. Encounter for laboratory testing for COVID-19 virus        Plan:       Radha was seen today for annual exam.    Diagnoses and all orders for this visit:    Routine general medical examination at a health care facility  Has been healthy, but needs to lose some wt    Colon cancer screening  -     Cologuard Screening (Multitarget Stool DNA); Future  -     Cologuard Screening (Multitarget Stool DNA)    Perennial allergic rhinitis  -     Ambulatory referral/consult to Allergy; Future    Encounter for screening for COVID-19  -     COVID-19 Routine Screening; Future  Cough  -     COVID-19 Routine Screening    Encounter for laboratory testing for COVID-19 virus  -     COVID-19 Routine Screening        Health Maintenance       Date Due Completion Date    Pneumococcal Vaccines (Age 0-64) (1 of 2 - PPSV23) Never done ---    TETANUS VACCINE Never done ---    Colorectal Cancer Screening Never done ---    Mammogram  12/30/2022 12/30/2021    Cervical Cancer Screening 12/06/2024 12/6/2021    Lipid Panel 01/22/2026 1/22/2021          Follow up in about 1 year (around 1/3/2023).    Future Appointments   Date Time Provider Department Center   1/27/2022  9:30 AM Carissa Fontaine MD [facility]

## 2022-01-04 ENCOUNTER — TELEPHONE (OUTPATIENT)
Dept: ALLERGY | Facility: CLINIC | Age: 47
End: 2022-01-04

## 2022-01-04 ENCOUNTER — LAB VISIT (OUTPATIENT)
Dept: LAB | Facility: HOSPITAL | Age: 47
End: 2022-01-04
Payer: COMMERCIAL

## 2022-01-04 ENCOUNTER — OFFICE VISIT (OUTPATIENT)
Dept: ALLERGY | Facility: CLINIC | Age: 47
End: 2022-01-04
Payer: COMMERCIAL

## 2022-01-04 VITALS — BODY MASS INDEX: 26.27 KG/M2 | WEIGHT: 173.31 LBS | HEIGHT: 68 IN

## 2022-01-04 DIAGNOSIS — J31.0 CHRONIC RHINITIS: Primary | ICD-10-CM

## 2022-01-04 DIAGNOSIS — J31.0 CHRONIC RHINITIS: ICD-10-CM

## 2022-01-04 DIAGNOSIS — B99.9 RECURRENT INFECTIONS: ICD-10-CM

## 2022-01-04 PROCEDURE — 86003 ALLG SPEC IGE CRUDE XTRC EA: CPT | Performed by: STUDENT IN AN ORGANIZED HEALTH CARE EDUCATION/TRAINING PROGRAM

## 2022-01-04 PROCEDURE — 99244 PR OFFICE CONSULTATION,LEVEL IV: ICD-10-PCS | Mod: S$GLB,,, | Performed by: STUDENT IN AN ORGANIZED HEALTH CARE EDUCATION/TRAINING PROGRAM

## 2022-01-04 PROCEDURE — 99999 PR PBB SHADOW E&M-EST. PATIENT-LVL III: ICD-10-PCS | Mod: PBBFAC,,, | Performed by: STUDENT IN AN ORGANIZED HEALTH CARE EDUCATION/TRAINING PROGRAM

## 2022-01-04 PROCEDURE — 86003 ALLG SPEC IGE CRUDE XTRC EA: CPT | Mod: 59 | Performed by: STUDENT IN AN ORGANIZED HEALTH CARE EDUCATION/TRAINING PROGRAM

## 2022-01-04 PROCEDURE — 36415 COLL VENOUS BLD VENIPUNCTURE: CPT | Performed by: STUDENT IN AN ORGANIZED HEALTH CARE EDUCATION/TRAINING PROGRAM

## 2022-01-04 PROCEDURE — 86317 IMMUNOASSAY INFECTIOUS AGENT: CPT | Performed by: STUDENT IN AN ORGANIZED HEALTH CARE EDUCATION/TRAINING PROGRAM

## 2022-01-04 PROCEDURE — 99244 OFF/OP CNSLTJ NEW/EST MOD 40: CPT | Mod: S$GLB,,, | Performed by: STUDENT IN AN ORGANIZED HEALTH CARE EDUCATION/TRAINING PROGRAM

## 2022-01-04 PROCEDURE — 99999 PR PBB SHADOW E&M-EST. PATIENT-LVL III: CPT | Mod: PBBFAC,,, | Performed by: STUDENT IN AN ORGANIZED HEALTH CARE EDUCATION/TRAINING PROGRAM

## 2022-01-04 RX ORDER — OLOPATADINE HYDROCHLORIDE 1 MG/ML
1 SOLUTION/ DROPS OPHTHALMIC 2 TIMES DAILY PRN
Qty: 5 ML | Refills: 11 | Status: SHIPPED | OUTPATIENT
Start: 2022-01-04 | End: 2023-04-11 | Stop reason: SDUPTHER

## 2022-01-04 RX ORDER — FLUTICASONE PROPIONATE 50 MCG
2 SPRAY, SUSPENSION (ML) NASAL 2 TIMES DAILY
Qty: 16 G | Refills: 11 | Status: SHIPPED | OUTPATIENT
Start: 2022-01-04 | End: 2023-04-11 | Stop reason: SDUPTHER

## 2022-01-04 RX ORDER — IPRATROPIUM BROMIDE 21 UG/1
2 SPRAY, METERED NASAL 2 TIMES DAILY PRN
Qty: 30 ML | Refills: 6 | Status: SHIPPED | OUTPATIENT
Start: 2022-01-04 | End: 2023-04-11 | Stop reason: SDUPTHER

## 2022-01-04 RX ORDER — AZELASTINE 1 MG/ML
2 SPRAY, METERED NASAL 2 TIMES DAILY
Qty: 30 ML | Refills: 11 | Status: SHIPPED | OUTPATIENT
Start: 2022-01-04 | End: 2023-04-11 | Stop reason: SDUPTHER

## 2022-01-04 NOTE — TELEPHONE ENCOUNTER
Called patient as strep pneumo titers were not collected with this morning's blood draw. She will report back to lab to repeat blood draw. Also added orders for CBC and immunoglobulin levels.

## 2022-01-04 NOTE — PROGRESS NOTES
"ALLERGY & IMMUNOLOGY CLINIC - INITIAL CONSULTATION      HISTORY OF PRESENT ILLNESS     Patient ID: Radha Tabor is a 46 y.o. female    CC: "evaluation for allergies"    HPI: Ms. Tabor is a 46 year old female who presents as a referral from Dr. Felix for evaluation of chronic rhinitis. Symptoms consist of rhinorrhea, sneezing, nasal congestion, itching/watchy eyes, and ear fullness. Symptoms first started when she was in middle school. She was previously on flonase once daily and did notice some improvement in symptoms. She recently was seen by an ENT who prescribed azelastine 2 SEN daily and stopped flonase but continues to have symptoms. Rhinorrhea is also worse when exercising such as doing yoga. She notes worsening of symptoms when the weather changes from "cold to hot" and "hot to cold". She occasionally uses visine for her eye symptoms. She also notes history of at least three sinus infections or ear infections per year requiring antibiotics.     H/o Asthma: denies  H/o Eczema: denies  H/o Rhinitis: see above  Oral Allergy: denies  Food Allergy: denies  Venom Allergy: denies  Latex Allergy: denies  Other Allergies: denies  Env/Occ: denies any environmental or occupational exposures     REVIEW OF SYSTEMS     Review of Systems   Constitutional: Negative for chills and fever.   HENT: Positive for congestion. Negative for ear pain and sore throat.    Eyes: Negative for pain and redness.   Respiratory: Negative for cough, shortness of breath and wheezing.    Gastrointestinal: Negative for abdominal pain, constipation, diarrhea, nausea and vomiting.   Skin: Negative for itching and rash.      MEDICAL HISTORY     MedHx: active problems reviewed  SurgHx:   Past Surgical History:   Procedure Laterality Date    Dx scope  10/16/2012    TDP hysteroscope    HYSTEROSCOPY      endometriosis    LASER LAPAROSCOPY      NASAL SEPTUM SURGERY         FamHx:   -Asthma: denies  -Rhinitis: denies    Allergies: see " below  Medications: MAR reviewed       PHYSICAL EXAM   Physical Exam  Constitutional:       Appearance: Normal appearance.   HENT:      Head: Normocephalic and atraumatic.      Nose:      Comments: 3+ pale nasal turbinates     Mouth/Throat:      Mouth: Mucous membranes are moist.   Eyes:      Extraocular Movements: Extraocular movements intact.      Conjunctiva/sclera: Conjunctivae normal.      Pupils: Pupils are equal, round, and reactive to light.   Pulmonary:      Effort: Pulmonary effort is normal. No respiratory distress.      Breath sounds: Normal breath sounds. No wheezing.   Lymphadenopathy:      Cervical: No cervical adenopathy.   Skin:     General: Skin is warm and dry.      Findings: No rash.   Neurological:      Mental Status: She is alert.   Psychiatric:         Mood and Affect: Mood normal.         Behavior: Behavior normal.         Thought Content: Thought content normal.         Judgment: Judgment normal.        LABORATORY STUDIES     Reviewed     ALLERGEN TESTING     Skin Prick: none    Immunocaps: ordered     PULMONARY FUNCTION     Peak Flow (clinic): n/a    PFTs: none     IMAGING & OTHER DIAGNOSTICS     Reviewed     ASSESSMENT & PLAN     Radha Tabor is a 46 y.o. female with     Chronic rhinitis: DDx includes allergic vs non-allergic rhinitis. Per history likley allergic in nature so will order Region 6+ panel to evaluate for allergic trigger. Restart Flonase and increase to 2 SEN daily. Increase azelastine to 2 SEN daily. Also start olopatadine drops as needed for itchy/watery eyes. Will also start atrovent nasal spray as needed prior to exercise to prevent rhinorrhea.     Recurrent infections: will evaluate immune system with strep pneumo titers, CBC, immunoglobulin levels    Follow up: 6 weeks    Patient discussed with Dr. Janes Cole MD  Allergy/Immunology Fellow

## 2022-01-06 LAB
SARS-COV-2 RNA RESP QL NAA+PROBE: NOT DETECTED
SARS-COV-2- CYCLE NUMBER: NORMAL

## 2022-01-07 LAB
A ALTERNATA IGE QN: 6.68 KU/L
A FUMIGATUS IGE QN: 0.86 KU/L
BERMUDA GRASS IGE QN: <0.1 KU/L
CAT DANDER IGE QN: 2.21 KU/L
CEDAR IGE QN: <0.1 KU/L
D FARINAE IGE QN: 0.74 KU/L
D PTERONYSS IGE QN: 0.33 KU/L
DEPRECATED A ALTERNATA IGE RAST QL: ABNORMAL
DEPRECATED A FUMIGATUS IGE RAST QL: ABNORMAL
DEPRECATED BERMUDA GRASS IGE RAST QL: NORMAL
DEPRECATED CAT DANDER IGE RAST QL: ABNORMAL
DEPRECATED CEDAR IGE RAST QL: NORMAL
DEPRECATED D FARINAE IGE RAST QL: ABNORMAL
DEPRECATED D PTERONYSS IGE RAST QL: ABNORMAL
DEPRECATED DOG DANDER IGE RAST QL: ABNORMAL
DEPRECATED ELDER IGE RAST QL: NORMAL
DEPRECATED ENGL PLANTAIN IGE RAST QL: NORMAL
DEPRECATED PECAN/HICK TREE IGE RAST QL: NORMAL
DEPRECATED ROACH IGE RAST QL: NORMAL
DEPRECATED TIMOTHY IGE RAST QL: NORMAL
DEPRECATED WEST RAGWEED IGE RAST QL: ABNORMAL
DEPRECATED WHITE OAK IGE RAST QL: ABNORMAL
DOG DANDER IGE QN: 0.28 KU/L
ELDER IGE QN: <0.1 KU/L
ENGL PLANTAIN IGE QN: <0.1 KU/L
PECAN/HICK TREE IGE QN: <0.1 KU/L
ROACH IGE QN: <0.1 KU/L
TIMOTHY IGE QN: <0.1 KU/L
WEST RAGWEED IGE QN: 0.2 KU/L
WHITE OAK IGE QN: 0.12 KU/L

## 2022-01-13 LAB
DEPRECATED S PNEUM12 IGG SER-MCNC: <0.3 UG/ML
DEPRECATED S PNEUM23 IGG SER-MCNC: 0.9 UG/ML
DEPRECATED S PNEUM4 IGG SER-MCNC: 0.4 UG/ML
DEPRECATED S PNEUM8 IGG SER-MCNC: <0.3 UG/ML
DEPRECATED S PNEUM9 IGG SER-MCNC: <0.3 UG/ML
S PN DA SERO 19F IGG SER-MCNC: 2.2 UG/ML
S PNEUM DA 1 IGG SER-MCNC: 0.5 UG/ML
S PNEUM DA 14 IGG SER-MCNC: <0.3
S PNEUM DA 18C IGG SER-MCNC: <0.3
S PNEUM DA 3 IGG SER-MCNC: 0.7 UG/ML
S PNEUM DA 5 IGG SER-MCNC: 0.7 UG/ML
S PNEUM DA 6B IGG SER-MCNC: 1 UG/ML
S PNEUM DA 7F IGG SER-MCNC: 0.4 UG/ML
S PNEUM DA 9V IGG SER-MCNC: <0.3 UG/ML

## 2022-01-18 ENCOUNTER — TELEPHONE (OUTPATIENT)
Dept: ALLERGY | Facility: CLINIC | Age: 47
End: 2022-01-18

## 2022-01-18 ENCOUNTER — PATIENT MESSAGE (OUTPATIENT)
Dept: ALLERGY | Facility: CLINIC | Age: 47
End: 2022-01-18
Payer: COMMERCIAL

## 2022-01-18 DIAGNOSIS — B99.9 RECURRENT INFECTIONS: Primary | ICD-10-CM

## 2022-01-18 NOTE — TELEPHONE ENCOUNTER
----- Message from Ayesha Cole MD sent at 1/18/2022 10:09 AM CST -----  Regarding: Pneumovax  Ben Meraz,    Can you please schedule this patient for pneumovax injection?     Thank you so much!    Lee

## 2022-01-18 NOTE — TELEPHONE ENCOUNTER
Spoke with the patient and scheduled her on 01/24 at 8:30AM to receive her Pneumovax per Dr. Cole.

## 2022-01-20 ENCOUNTER — CLINICAL SUPPORT (OUTPATIENT)
Dept: OTHER | Facility: CLINIC | Age: 47
End: 2022-01-20
Payer: COMMERCIAL

## 2022-01-20 DIAGNOSIS — Z00.8 ENCOUNTER FOR OTHER GENERAL EXAMINATION: ICD-10-CM

## 2022-01-21 VITALS — HEIGHT: 68 IN | BODY MASS INDEX: 26.35 KG/M2

## 2022-01-21 LAB
HDLC SERPL-MCNC: 49 MG/DL
POC CHOLESTEROL, LDL (DOCK): 122 MG/DL
POC CHOLESTEROL, TOTAL: 191 MG/DL
POC GLUCOSE, FASTING: 77 MG/DL (ref 60–110)
TRIGL SERPL-MCNC: 97 MG/DL

## 2022-01-27 LAB — NONINV COLON CA DNA+OCC BLD SCRN STL QL: NEGATIVE

## 2022-02-09 ENCOUNTER — PATIENT MESSAGE (OUTPATIENT)
Dept: OTOLARYNGOLOGY | Facility: CLINIC | Age: 47
End: 2022-02-09
Payer: COMMERCIAL

## 2022-11-10 ENCOUNTER — OFFICE VISIT (OUTPATIENT)
Dept: URGENT CARE | Facility: CLINIC | Age: 47
End: 2022-11-10
Payer: COMMERCIAL

## 2022-11-10 VITALS
DIASTOLIC BLOOD PRESSURE: 75 MMHG | HEART RATE: 74 BPM | WEIGHT: 162 LBS | HEIGHT: 68 IN | RESPIRATION RATE: 18 BRPM | SYSTOLIC BLOOD PRESSURE: 122 MMHG | OXYGEN SATURATION: 99 % | TEMPERATURE: 98 F | BODY MASS INDEX: 24.55 KG/M2

## 2022-11-10 DIAGNOSIS — J06.9 BACTERIAL URI: ICD-10-CM

## 2022-11-10 DIAGNOSIS — H66.91 ACUTE RIGHT OTITIS MEDIA: ICD-10-CM

## 2022-11-10 DIAGNOSIS — J02.9 SORE THROAT: Primary | ICD-10-CM

## 2022-11-10 DIAGNOSIS — B96.89 BACTERIAL URI: ICD-10-CM

## 2022-11-10 LAB
CTP QC/QA: YES
MOLECULAR STREP A: NEGATIVE

## 2022-11-10 PROCEDURE — 99213 OFFICE O/P EST LOW 20 MIN: CPT | Mod: S$GLB,,, | Performed by: FAMILY MEDICINE

## 2022-11-10 PROCEDURE — 1159F MED LIST DOCD IN RCRD: CPT | Mod: CPTII,S$GLB,, | Performed by: FAMILY MEDICINE

## 2022-11-10 PROCEDURE — 3078F DIAST BP <80 MM HG: CPT | Mod: CPTII,S$GLB,, | Performed by: FAMILY MEDICINE

## 2022-11-10 PROCEDURE — 3074F SYST BP LT 130 MM HG: CPT | Mod: CPTII,S$GLB,, | Performed by: FAMILY MEDICINE

## 2022-11-10 PROCEDURE — 3008F BODY MASS INDEX DOCD: CPT | Mod: CPTII,S$GLB,, | Performed by: FAMILY MEDICINE

## 2022-11-10 PROCEDURE — 3074F PR MOST RECENT SYSTOLIC BLOOD PRESSURE < 130 MM HG: ICD-10-PCS | Mod: CPTII,S$GLB,, | Performed by: FAMILY MEDICINE

## 2022-11-10 PROCEDURE — 1160F RVW MEDS BY RX/DR IN RCRD: CPT | Mod: CPTII,S$GLB,, | Performed by: FAMILY MEDICINE

## 2022-11-10 PROCEDURE — 3008F PR BODY MASS INDEX (BMI) DOCUMENTED: ICD-10-PCS | Mod: CPTII,S$GLB,, | Performed by: FAMILY MEDICINE

## 2022-11-10 PROCEDURE — 87651 STREP A DNA AMP PROBE: CPT | Mod: QW,S$GLB,, | Performed by: FAMILY MEDICINE

## 2022-11-10 PROCEDURE — 99213 PR OFFICE/OUTPT VISIT, EST, LEVL III, 20-29 MIN: ICD-10-PCS | Mod: S$GLB,,, | Performed by: FAMILY MEDICINE

## 2022-11-10 PROCEDURE — 1160F PR REVIEW ALL MEDS BY PRESCRIBER/CLIN PHARMACIST DOCUMENTED: ICD-10-PCS | Mod: CPTII,S$GLB,, | Performed by: FAMILY MEDICINE

## 2022-11-10 PROCEDURE — 87651 POCT STREP A MOLECULAR: ICD-10-PCS | Mod: QW,S$GLB,, | Performed by: FAMILY MEDICINE

## 2022-11-10 PROCEDURE — 3078F PR MOST RECENT DIASTOLIC BLOOD PRESSURE < 80 MM HG: ICD-10-PCS | Mod: CPTII,S$GLB,, | Performed by: FAMILY MEDICINE

## 2022-11-10 PROCEDURE — 1159F PR MEDICATION LIST DOCUMENTED IN MEDICAL RECORD: ICD-10-PCS | Mod: CPTII,S$GLB,, | Performed by: FAMILY MEDICINE

## 2022-11-10 RX ORDER — AMOXICILLIN AND CLAVULANATE POTASSIUM 875; 125 MG/1; MG/1
1 TABLET, FILM COATED ORAL EVERY 12 HOURS
Qty: 20 TABLET | Refills: 0 | Status: SHIPPED | OUTPATIENT
Start: 2022-11-10 | End: 2022-11-20

## 2022-11-10 RX ORDER — IBUPROFEN 800 MG/1
800 TABLET ORAL 3 TIMES DAILY PRN
Qty: 21 TABLET | Refills: 0 | Status: SHIPPED | OUTPATIENT
Start: 2022-11-10 | End: 2022-11-17

## 2022-11-10 RX ORDER — BENZONATATE 200 MG/1
200 CAPSULE ORAL 3 TIMES DAILY PRN
Qty: 30 CAPSULE | Refills: 0 | Status: SHIPPED | OUTPATIENT
Start: 2022-11-10 | End: 2022-11-20

## 2022-11-10 RX ORDER — PROMETHAZINE HYDROCHLORIDE AND DEXTROMETHORPHAN HYDROBROMIDE 6.25; 15 MG/5ML; MG/5ML
5 SYRUP ORAL EVERY 4 HOURS PRN
Qty: 240 ML | Refills: 0 | Status: SHIPPED | OUTPATIENT
Start: 2022-11-10 | End: 2022-11-20

## 2022-11-10 RX ORDER — FLUTICASONE PROPIONATE 50 MCG
2 SPRAY, SUSPENSION (ML) NASAL 2 TIMES DAILY PRN
Qty: 9.9 ML | Refills: 0 | Status: CANCELLED | OUTPATIENT
Start: 2022-11-10

## 2022-11-10 NOTE — PATIENT INSTRUCTIONS
Use Flonase as needed  Follow up with primary care provider in 1 week for reevaluation.  May return to urgent care if needed.

## 2022-11-10 NOTE — PROGRESS NOTES
"Subjective:       Patient ID: Radha Tabor is a 47 y.o. female.      Chief Complaint: Sore Throat (Entered by patient)    Pt states her symptoms start on Tuesday. Pt states her symptoms are unchanged. Pt states she took desylm today and has had mild relief.     Sore Throat   This is a new problem. The current episode started in the past 7 days. The problem has been unchanged. Neither side of throat is experiencing more pain than the other. There has been no fever. The pain is at a severity of 7/10. The pain is moderate. Associated symptoms include coughing, ear discharge, ear pain, a hoarse voice, a plugged ear sensation and trouble swallowing. Pertinent negatives include no congestion, shortness of breath or vomiting. She has had no exposure to strep or mono. Treatments tried: desylm. The treatment provided mild relief.     Constitution: Negative for activity change, appetite change, chills, fatigue, fever and generalized weakness.   HENT:  Positive for ear pain, ear discharge, postnasal drip, sore throat and trouble swallowing. Negative for congestion and sinus pressure.    Neck: Negative for neck swelling.   Cardiovascular:  Negative for chest pain, leg swelling, palpitations, sob on exertion and passing out.   Eyes:  Negative for vision loss.   Respiratory:  Positive for cough. Negative for chest tightness and shortness of breath.    Gastrointestinal:  Negative for nausea and vomiting.   Genitourinary:  Negative for dysuria.   Skin:  Negative for rash.   Neurological:  Negative for passing out, altered mental status and numbness.   Psychiatric/Behavioral:  Negative for altered mental status, confusion and agitation.      Objective:      Vitals:    11/10/22 1419   BP: 122/75   Pulse: 74   Resp: 18   Temp: 98.4 °F (36.9 °C)   TempSrc: Oral   SpO2: 99%   Weight: 73.5 kg (162 lb)   Height: 5' 8" (1.727 m)      Physical Exam   Constitutional: She is oriented to person, place, and time. She appears well-developed. " She is cooperative.  Non-toxic appearance. She does not appear ill. No distress.   HENT:   Head: Normocephalic and atraumatic.   Ears:   Right Ear: Hearing, external ear and ear canal normal.   Left Ear: Hearing, tympanic membrane, external ear and ear canal normal.      Comments: Erythematous tympanic membrane with effusion  Nose: Congestion present. No mucosal edema, rhinorrhea or nasal deformity. No epistaxis. Right sinus exhibits no maxillary sinus tenderness and no frontal sinus tenderness. Left sinus exhibits no maxillary sinus tenderness and no frontal sinus tenderness.   Mouth/Throat: Uvula is midline and mucous membranes are normal. No trismus in the jaw. Normal dentition. No uvula swelling. Posterior oropharyngeal erythema present. No oropharyngeal exudate or posterior oropharyngeal edema.   Eyes: Conjunctivae and lids are normal. No scleral icterus.   Neck: Trachea normal and phonation normal. Neck supple. No edema present. No erythema present. No neck rigidity present.   Cardiovascular: Normal rate, regular rhythm, normal heart sounds and normal pulses.   Pulmonary/Chest: Effort normal and breath sounds normal. No respiratory distress. She has no decreased breath sounds. She has no rhonchi.   Abdominal: Normal appearance and bowel sounds are normal. Soft.   Neurological: She is alert and oriented to person, place, and time. She exhibits normal muscle tone. Coordination normal.   Skin: Skin is warm, intact and not diaphoretic.   Psychiatric: Her speech is normal and behavior is normal. Judgment and thought content normal.   Nursing note and vitals reviewed.      Results for orders placed or performed in visit on 11/10/22   POCT Strep A, Molecular   Result Value Ref Range    Molecular Strep A, POC Negative Negative     Acceptable Yes       Assessment:       1. Sore throat    2. Bacterial URI    3. Acute right otitis media          Plan:         Sore throat  -     POCT Strep A,  Molecular    2. Bacterial URI  3. Acute right otitis media    -     ibuprofen (ADVIL,MOTRIN) 800 MG tablet; Take 1 tablet (800 mg total) by mouth 3 (three) times daily as needed for Pain. Take with meals. Do not combine with mobic.  Dispense: 21 tablet; Refill: 0  -     promethazine-dextromethorphan (PROMETHAZINE-DM) 6.25-15 mg/5 mL Syrp; Take 5 mLs by mouth every 4 (four) hours as needed (cough).  Dispense: 240 mL; Refill: 0  -     benzonatate (TESSALON) 200 MG capsule; Take 1 capsule (200 mg total) by mouth 3 (three) times daily as needed for Cough.  Dispense: 30 capsule; Refill: 0  -     amoxicillin-clavulanate 875-125mg (AUGMENTIN) 875-125 mg per tablet; Take 1 tablet by mouth every 12 (twelve) hours. Take with yogurt or probiotic for 10 days  Dispense: 20 tablet; Refill: 0       Patient Instructions   Use Flonase as needed  Follow up with primary care provider in 1 week for reevaluation.  May return to urgent care if needed.

## 2023-01-18 DIAGNOSIS — Z12.31 OTHER SCREENING MAMMOGRAM: ICD-10-CM

## 2023-04-11 ENCOUNTER — OFFICE VISIT (OUTPATIENT)
Dept: FAMILY MEDICINE | Facility: CLINIC | Age: 48
End: 2023-04-11
Payer: COMMERCIAL

## 2023-04-11 VITALS
HEIGHT: 68 IN | SYSTOLIC BLOOD PRESSURE: 122 MMHG | DIASTOLIC BLOOD PRESSURE: 74 MMHG | OXYGEN SATURATION: 98 % | HEART RATE: 74 BPM | RESPIRATION RATE: 17 BRPM | TEMPERATURE: 99 F | BODY MASS INDEX: 25.53 KG/M2 | WEIGHT: 168.44 LBS

## 2023-04-11 DIAGNOSIS — J32.9 CHRONIC SINUSITIS, UNSPECIFIED LOCATION: Primary | ICD-10-CM

## 2023-04-11 DIAGNOSIS — H60.392 INFECTION OF LEFT EARLOBE: ICD-10-CM

## 2023-04-11 DIAGNOSIS — J31.0 CHRONIC RHINITIS: ICD-10-CM

## 2023-04-11 PROCEDURE — 99214 OFFICE O/P EST MOD 30 MIN: CPT | Mod: S$GLB,,, | Performed by: PHYSICIAN ASSISTANT

## 2023-04-11 PROCEDURE — 1159F MED LIST DOCD IN RCRD: CPT | Mod: CPTII,S$GLB,, | Performed by: PHYSICIAN ASSISTANT

## 2023-04-11 PROCEDURE — 1159F PR MEDICATION LIST DOCUMENTED IN MEDICAL RECORD: ICD-10-PCS | Mod: CPTII,S$GLB,, | Performed by: PHYSICIAN ASSISTANT

## 2023-04-11 PROCEDURE — 3008F BODY MASS INDEX DOCD: CPT | Mod: CPTII,S$GLB,, | Performed by: PHYSICIAN ASSISTANT

## 2023-04-11 PROCEDURE — 1160F PR REVIEW ALL MEDS BY PRESCRIBER/CLIN PHARMACIST DOCUMENTED: ICD-10-PCS | Mod: CPTII,S$GLB,, | Performed by: PHYSICIAN ASSISTANT

## 2023-04-11 PROCEDURE — 3078F DIAST BP <80 MM HG: CPT | Mod: CPTII,S$GLB,, | Performed by: PHYSICIAN ASSISTANT

## 2023-04-11 PROCEDURE — 99214 PR OFFICE/OUTPT VISIT, EST, LEVL IV, 30-39 MIN: ICD-10-PCS | Mod: S$GLB,,, | Performed by: PHYSICIAN ASSISTANT

## 2023-04-11 PROCEDURE — 3074F SYST BP LT 130 MM HG: CPT | Mod: CPTII,S$GLB,, | Performed by: PHYSICIAN ASSISTANT

## 2023-04-11 PROCEDURE — 1160F RVW MEDS BY RX/DR IN RCRD: CPT | Mod: CPTII,S$GLB,, | Performed by: PHYSICIAN ASSISTANT

## 2023-04-11 PROCEDURE — 3008F PR BODY MASS INDEX (BMI) DOCUMENTED: ICD-10-PCS | Mod: CPTII,S$GLB,, | Performed by: PHYSICIAN ASSISTANT

## 2023-04-11 PROCEDURE — 99999 PR PBB SHADOW E&M-EST. PATIENT-LVL III: CPT | Mod: PBBFAC,,, | Performed by: PHYSICIAN ASSISTANT

## 2023-04-11 PROCEDURE — 99999 PR PBB SHADOW E&M-EST. PATIENT-LVL III: ICD-10-PCS | Mod: PBBFAC,,, | Performed by: PHYSICIAN ASSISTANT

## 2023-04-11 PROCEDURE — 3074F PR MOST RECENT SYSTOLIC BLOOD PRESSURE < 130 MM HG: ICD-10-PCS | Mod: CPTII,S$GLB,, | Performed by: PHYSICIAN ASSISTANT

## 2023-04-11 PROCEDURE — 3078F PR MOST RECENT DIASTOLIC BLOOD PRESSURE < 80 MM HG: ICD-10-PCS | Mod: CPTII,S$GLB,, | Performed by: PHYSICIAN ASSISTANT

## 2023-04-11 RX ORDER — IPRATROPIUM BROMIDE 21 UG/1
2 SPRAY, METERED NASAL 2 TIMES DAILY PRN
Qty: 30 ML | Refills: 11 | Status: SHIPPED | OUTPATIENT
Start: 2023-04-11 | End: 2023-12-02 | Stop reason: SDUPTHER

## 2023-04-11 RX ORDER — FLUTICASONE PROPIONATE 50 MCG
2 SPRAY, SUSPENSION (ML) NASAL 2 TIMES DAILY
Qty: 16 G | Refills: 11 | Status: SHIPPED | OUTPATIENT
Start: 2023-04-11 | End: 2023-07-24 | Stop reason: SDUPTHER

## 2023-04-11 RX ORDER — OLOPATADINE HYDROCHLORIDE 1 MG/ML
1 SOLUTION/ DROPS OPHTHALMIC 2 TIMES DAILY PRN
Qty: 5 ML | Refills: 11 | Status: SHIPPED | OUTPATIENT
Start: 2023-04-11 | End: 2024-04-10

## 2023-04-11 RX ORDER — MINERAL OIL
180 ENEMA (ML) RECTAL DAILY
Qty: 90 TABLET | Refills: 3 | Status: SHIPPED | OUTPATIENT
Start: 2023-04-11

## 2023-04-11 RX ORDER — AZELASTINE 1 MG/ML
2 SPRAY, METERED NASAL 2 TIMES DAILY
Qty: 30 ML | Refills: 11 | Status: SHIPPED | OUTPATIENT
Start: 2023-04-11 | End: 2023-12-02 | Stop reason: SDUPTHER

## 2023-04-11 RX ORDER — MUPIROCIN 20 MG/G
OINTMENT TOPICAL 3 TIMES DAILY
Qty: 15 G | Refills: 0 | Status: SHIPPED | OUTPATIENT
Start: 2023-04-11

## 2023-04-11 NOTE — PROGRESS NOTES
Subjective     Patient ID: Radha Tabor is a 48 y.o. female.    Chief Complaint: Sore Throat    Sore Throat   This is a new problem. The current episode started in the past 7 days (4 days). The problem has been gradually worsening. Neither side of throat is experiencing more pain than the other. There has been no fever. Associated symptoms include congestion, coughing and ear pain. Pertinent negatives include no ear discharge, headaches, swollen glands or trouble swallowing. Treatments tried: dayquil, cough syrup.   Review of Systems   HENT:  Positive for nasal congestion, ear pain and sore throat. Negative for ear discharge and trouble swallowing.    Respiratory:  Positive for cough.    Neurological:  Negative for headaches.     Has h/o chronic sinusitis, rhinitis. On 3 nasal spray and allegra daily. Ran out 1 week ago. Symptoms started after that. Also having pain and drainage from left earlobe. Has recurrent infeciton.        Objective     Physical Exam  Constitutional:       Appearance: Normal appearance.   HENT:      Head: Normocephalic and atraumatic.      Right Ear: Tympanic membrane normal.      Left Ear: Tympanic membrane normal.      Ears:        Mouth/Throat:      Pharynx: Oropharynx is clear.   Eyes:      Conjunctiva/sclera: Conjunctivae normal.   Cardiovascular:      Rate and Rhythm: Normal rate and regular rhythm.   Pulmonary:      Effort: Pulmonary effort is normal.      Breath sounds: Normal breath sounds.   Skin:     General: Skin is warm and dry.   Neurological:      General: No focal deficit present.      Mental Status: She is alert.   Psychiatric:         Mood and Affect: Mood normal.          Assessment and Plan     Problem List Items Addressed This Visit    None       Radha was seen today for sore throat.    Diagnoses and all orders for this visit:    Chronic sinusitis, unspecified location  -     azelastine (ASTELIN) 137 mcg (0.1 %) nasal spray; 2 sprays (274 mcg total) by Nasal route 2 (two)  times daily.  -     fluticasone propionate (FLONASE) 50 mcg/actuation nasal spray; 2 sprays (100 mcg total) by Each Nostril route 2 (two) times a day.  -     ipratropium (ATROVENT) 21 mcg (0.03 %) nasal spray; 2 sprays by Each Nostril route 2 (two) times daily as needed for Rhinitis.  -     fexofenadine (ALLEGRA) 180 MG tablet; Take 1 tablet (180 mg total) by mouth once daily.    Chronic rhinitis  -     azelastine (ASTELIN) 137 mcg (0.1 %) nasal spray; 2 sprays (274 mcg total) by Nasal route 2 (two) times daily.  -     fluticasone propionate (FLONASE) 50 mcg/actuation nasal spray; 2 sprays (100 mcg total) by Each Nostril route 2 (two) times a day.  -     ipratropium (ATROVENT) 21 mcg (0.03 %) nasal spray; 2 sprays by Each Nostril route 2 (two) times daily as needed for Rhinitis.  -     fexofenadine (ALLEGRA) 180 MG tablet; Take 1 tablet (180 mg total) by mouth once daily.  -     olopatadine (PATANOL) 0.1 % ophthalmic solution; Place 1 drop into both eyes 2 (two) times daily as needed for Allergies (itchy watery eyes).    Infection of left earlobe  -     mupirocin (BACTROBAN) 2 % ointment; Apply topically 3 (three) times daily.

## 2023-05-01 ENCOUNTER — HOSPITAL ENCOUNTER (OUTPATIENT)
Dept: RADIOLOGY | Facility: OTHER | Age: 48
Discharge: HOME | End: 2023-05-01
Attending: NURSE PRACTITIONER
Payer: COMMERCIAL

## 2023-05-01 ENCOUNTER — OFFICE VISIT (OUTPATIENT)
Dept: SPINE | Facility: CLINIC | Age: 48
End: 2023-05-01
Payer: COMMERCIAL

## 2023-05-01 VITALS
OXYGEN SATURATION: 100 % | HEART RATE: 70 BPM | DIASTOLIC BLOOD PRESSURE: 72 MMHG | BODY MASS INDEX: 25.81 KG/M2 | WEIGHT: 169.75 LBS | TEMPERATURE: 98 F | RESPIRATION RATE: 18 BRPM | SYSTOLIC BLOOD PRESSURE: 134 MMHG

## 2023-05-01 DIAGNOSIS — M79.18 MYOFASCIAL PAIN: Primary | ICD-10-CM

## 2023-05-01 DIAGNOSIS — M47.817 SPONDYLOSIS WITHOUT MYELOPATHY OR RADICULOPATHY, LUMBOSACRAL REGION: ICD-10-CM

## 2023-05-01 DIAGNOSIS — M54.9 DORSALGIA, UNSPECIFIED: ICD-10-CM

## 2023-05-01 PROCEDURE — 99204 PR OFFICE/OUTPT VISIT, NEW, LEVL IV, 45-59 MIN: ICD-10-PCS | Mod: S$GLB,,, | Performed by: NURSE PRACTITIONER

## 2023-05-01 PROCEDURE — 3078F PR MOST RECENT DIASTOLIC BLOOD PRESSURE < 80 MM HG: ICD-10-PCS | Mod: CPTII,S$GLB,, | Performed by: NURSE PRACTITIONER

## 2023-05-01 PROCEDURE — 99999 PR PBB SHADOW E&M-EST. PATIENT-LVL IV: ICD-10-PCS | Mod: PBBFAC,,, | Performed by: NURSE PRACTITIONER

## 2023-05-01 PROCEDURE — 72114 XR LUMBAR SPINE 5 VIEW WITH FLEX AND EXT: ICD-10-PCS | Mod: 26,,, | Performed by: RADIOLOGY

## 2023-05-01 PROCEDURE — 3075F PR MOST RECENT SYSTOLIC BLOOD PRESS GE 130-139MM HG: ICD-10-PCS | Mod: CPTII,S$GLB,, | Performed by: NURSE PRACTITIONER

## 2023-05-01 PROCEDURE — 3008F BODY MASS INDEX DOCD: CPT | Mod: CPTII,S$GLB,, | Performed by: NURSE PRACTITIONER

## 2023-05-01 PROCEDURE — 99999 PR PBB SHADOW E&M-EST. PATIENT-LVL IV: CPT | Mod: PBBFAC,,, | Performed by: NURSE PRACTITIONER

## 2023-05-01 PROCEDURE — 1159F MED LIST DOCD IN RCRD: CPT | Mod: CPTII,S$GLB,, | Performed by: NURSE PRACTITIONER

## 2023-05-01 PROCEDURE — 72114 X-RAY EXAM L-S SPINE BENDING: CPT | Mod: 26,,, | Performed by: RADIOLOGY

## 2023-05-01 PROCEDURE — 1160F RVW MEDS BY RX/DR IN RCRD: CPT | Mod: CPTII,S$GLB,, | Performed by: NURSE PRACTITIONER

## 2023-05-01 PROCEDURE — 1159F PR MEDICATION LIST DOCUMENTED IN MEDICAL RECORD: ICD-10-PCS | Mod: CPTII,S$GLB,, | Performed by: NURSE PRACTITIONER

## 2023-05-01 PROCEDURE — 3075F SYST BP GE 130 - 139MM HG: CPT | Mod: CPTII,S$GLB,, | Performed by: NURSE PRACTITIONER

## 2023-05-01 PROCEDURE — 3078F DIAST BP <80 MM HG: CPT | Mod: CPTII,S$GLB,, | Performed by: NURSE PRACTITIONER

## 2023-05-01 PROCEDURE — 3008F PR BODY MASS INDEX (BMI) DOCUMENTED: ICD-10-PCS | Mod: CPTII,S$GLB,, | Performed by: NURSE PRACTITIONER

## 2023-05-01 PROCEDURE — 99204 OFFICE O/P NEW MOD 45 MIN: CPT | Mod: S$GLB,,, | Performed by: NURSE PRACTITIONER

## 2023-05-01 PROCEDURE — 72114 X-RAY EXAM L-S SPINE BENDING: CPT | Mod: TC,FY

## 2023-05-01 PROCEDURE — 1160F PR REVIEW ALL MEDS BY PRESCRIBER/CLIN PHARMACIST DOCUMENTED: ICD-10-PCS | Mod: CPTII,S$GLB,, | Performed by: NURSE PRACTITIONER

## 2023-05-01 NOTE — PROGRESS NOTES
Subjective:     Patient ID: Radha Tabor is a 48 y.o. female.    Chief Complaint: Low-back Pain    HPI Ms. Tabor is a 48 year old female here for evaluation of low back pain    C/o bandlike low back pain for the past 4 years  Denies leg pain or numbness/tingling  Worse with turning in bed and sit to stand  No PT or injections in the past  Not interested in medications    Lumbar xray 2021    FINDINGS:  No significant alignment abnormality.  Vertebral body heights are normally maintained, without compression deformity at any level.  No definite spondylitic defects.  No disc narrowing.  Vertebral endplates are well defined.  No osseous destructive process.  SI joints appear unremarkable.     Impression:     No significant abnormality identified.    Past Medical History:   Diagnosis Date    Acute hepatitis C without mention of hepatic coma(070.51)     Endometriosis, site unspecified     Viral hepatitis C 6/18/2016    Viral hepatitis C 6/18/2016       Past Surgical History:   Procedure Laterality Date    Dx scope  10/16/2012    TDP hysteroscope    HYSTEROSCOPY      endometriosis    LASER LAPAROSCOPY      NASAL SEPTUM SURGERY         Family History   Problem Relation Age of Onset    Hypertension Mother     Heart disease Mother         murmur    Hypertension Father     Heart disease Paternal Grandmother     Breast cancer Neg Hx     Colon cancer Neg Hx     Ovarian cancer Neg Hx        Social History     Socioeconomic History    Marital status:    Tobacco Use    Smoking status: Never    Smokeless tobacco: Never   Substance and Sexual Activity    Alcohol use: No     Alcohol/week: 0.0 standard drinks    Drug use: No    Sexual activity: Yes     Partners: Male     Birth control/protection: None   Social History Narrative     at [place],  (HTN), 2 step kids, 22, 21.     Social Determinants of Health     Financial Resource Strain: Low Risk     Difficulty of Paying Living Expenses: Not hard at all    Food Insecurity: No Food Insecurity    Worried About Running Out of Food in the Last Year: Never true    Ran Out of Food in the Last Year: Never true   Transportation Needs: No Transportation Needs    Lack of Transportation (Medical): No    Lack of Transportation (Non-Medical): No   Physical Activity: Inactive    Days of Exercise per Week: 0 days    Minutes of Exercise per Session: 30 min   Stress: No Stress Concern Present    Feeling of Stress : Only a little   Social Connections: Unknown    Frequency of Communication with Friends and Family: More than three times a week    Frequency of Social Gatherings with Friends and Family: Once a week    Active Member of Clubs or Organizations: Yes    Attends Club or Organization Meetings: More than 4 times per year    Marital Status:    Housing Stability: Low Risk     Unable to Pay for Housing in the Last Year: No    Number of Places Lived in the Last Year: 2    Unstable Housing in the Last Year: No       Current Outpatient Medications   Medication Sig Dispense Refill    azelastine (ASTELIN) 137 mcg (0.1 %) nasal spray 2 sprays (274 mcg total) by Nasal route 2 (two) times daily. 30 mL 11    fexofenadine (ALLEGRA) 180 MG tablet Take 1 tablet (180 mg total) by mouth once daily. 90 tablet 3    fluticasone propionate (FLONASE) 50 mcg/actuation nasal spray 2 sprays (100 mcg total) by Each Nostril route 2 (two) times a day. 16 g 11    ipratropium (ATROVENT) 21 mcg (0.03 %) nasal spray 2 sprays by Each Nostril route 2 (two) times daily as needed for Rhinitis. 30 mL 11    mupirocin (BACTROBAN) 2 % ointment Apply topically 3 (three) times daily. 15 g 0    olopatadine (PATANOL) 0.1 % ophthalmic solution Place 1 drop into both eyes 2 (two) times daily as needed for Allergies (itchy watery eyes). 5 mL 11    meloxicam (MOBIC) 15 MG tablet TAKE 1 TABLET(15 MG) BY MOUTH EVERY DAY (Patient not taking: Reported on 4/11/2023) 90 tablet 0    valACYclovir (VALTREX) 1000 MG tablet Take  1 tablet (1,000 mg total) by mouth every 12 (twelve) hours. for 10 days 20 tablet 6     No current facility-administered medications for this visit.       Review of patient's allergies indicates:   Allergen Reactions    Hay fever and allergy relief Hives and Itching         Review of Systems   Constitutional: Negative for fever.   Cardiovascular:  Negative for chest pain.   Respiratory:  Negative for shortness of breath.    Musculoskeletal:  Positive for back pain. Negative for falls.   Gastrointestinal:  Negative for abdominal pain, bowel incontinence, nausea and vomiting.   Genitourinary:  Negative for bladder incontinence.   Neurological:  Negative for numbness and paresthesias.      Objective:     General: Radha is well-developed, well-nourished, appears stated age, in no acute distress, alert and oriented to time, place and person.     General    Vitals reviewed.  Constitutional: She is oriented to person, place, and time. She appears well-developed and well-nourished.   HENT:   Head: Atraumatic.   Nose: Nose normal.   Eyes: Conjunctivae are normal.   Cardiovascular:  Normal rate.            Pulmonary/Chest: Effort normal.   Neurological: She is alert and oriented to person, place, and time.   Psychiatric: She has a normal mood and affect. Her behavior is normal. Judgment and thought content normal.     General Musculoskeletal Exam   Gait: normal     Back (L-Spine & T-Spine) / Neck (C-Spine) Exam     Tenderness Left paramedian tenderness of the Sacrum.     Back (L-Spine & T-Spine) Range of Motion   Extension:  20   Flexion:  90   Lateral bend right:  10   Lateral bend left:  10     Spinal Sensation   Right Side Sensation  L-Spine Level: normal  S-Spine Level: normal  Left Side Sensation  L-Spine Level: normal  S-Spine Level: normal    Other   She has no scoliosis .  Spinal Kyphosis:  Absent      Muscle Strength   Right Upper Extremity   Biceps: 5/5   Deltoid:  5/5  Triceps:  5/5  Left Upper Extremity  Biceps:  5/5   Deltoid:  5/5  Triceps:  5/5  Right Lower Extremity   Hip Flexion: 5/5   Quadriceps:  5/5   Ankle Dorsiflexion:  5/5   Anterior tibial:  5/5   EHL:  5/5  Left Lower Extremity   Hip Flexion: 5/5   Quadriceps:  5/5   Ankle Dorsiflexion:  5/5   Anterior tibial:  5/5   EHL:  5/5    Reflexes     Left Side  Biceps:  2+  Brachioradialis:  2+  Achilles:  2+  Left Vanessa's Sign:  Absent  Babinski Sign:  absent    Right Side   Biceps:  2+  Brachioradialis:  2+  Achilles:  2+  Right Vanessa's Sign:  absent  Babinski Sign:  absent    Vascular Exam     Right Pulses        Carotid:                  2+    Left Pulses        Carotid:                  2+        Assessment:     1. Myofascial pain    2. Dorsalgia, unspecified    3. Spondylosis without myelopathy or radiculopathy, lumbosacral region         Plan:        Prior records reviewed today  We discussed back pain and the nature of back pain.  We discussed that it is not one thing that causes the pain but an accumulation of multiple things that we do.    Order lumbar xray  Referral to [facility] for evaluation and treatment of low back pain and good HEP  Discussed muscle relaxer, but she would like to hold off for now  We discussed posture sitting and the importance of trying to sit better.    We discussed the benefits of therapy and exercise and continuing to move.   RTC for followup in 3 months    More than 50% of the total time  of 45 minutes was spent face to face in counseling on diagnosis and treatment options. I also counseled patient  on common and most usual side effect of prescribed medications.  I reviewed Primary care , and other specialty's notes to better coordinate patient's care. All questions were answered, and patient voiced understanding.      Follow-up: Follow up in about 3 months (around 8/1/2023). If there are any questions prior to this, the patient was instructed to contact the office.

## 2023-05-04 ENCOUNTER — HOSPITAL ENCOUNTER (OUTPATIENT)
Dept: RADIOLOGY | Facility: HOSPITAL | Age: 48
Discharge: HOME | End: 2023-05-04
Attending: INTERNAL MEDICINE
Payer: COMMERCIAL

## 2023-05-04 DIAGNOSIS — Z12.31 OTHER SCREENING MAMMOGRAM: ICD-10-CM

## 2023-05-04 PROCEDURE — 77067 SCR MAMMO BI INCL CAD: CPT | Mod: TC

## 2023-05-04 PROCEDURE — 77067 SCR MAMMO BI INCL CAD: CPT | Mod: 26,,, | Performed by: RADIOLOGY

## 2023-05-04 PROCEDURE — 77063 BREAST TOMOSYNTHESIS BI: CPT | Mod: 26,,, | Performed by: RADIOLOGY

## 2023-05-04 PROCEDURE — 77063 MAMMO DIGITAL SCREENING BILAT WITH TOMO: ICD-10-PCS | Mod: 26,,, | Performed by: RADIOLOGY

## 2023-05-04 PROCEDURE — 77067 MAMMO DIGITAL SCREENING BILAT WITH TOMO: ICD-10-PCS | Mod: 26,,, | Performed by: RADIOLOGY

## 2023-05-24 ENCOUNTER — CLINICAL SUPPORT (OUTPATIENT)
Dept: REHABILITATION | Facility: OTHER | Age: 48
End: 2023-05-24
Payer: COMMERCIAL

## 2023-05-24 DIAGNOSIS — Z78.9 ALTERATION IN MOBILITY ASSOCIATED WITH PAIN: ICD-10-CM

## 2023-05-24 DIAGNOSIS — M79.18 MYOFASCIAL PAIN: ICD-10-CM

## 2023-05-24 DIAGNOSIS — M47.817 SPONDYLOSIS WITHOUT MYELOPATHY OR RADICULOPATHY, LUMBOSACRAL REGION: ICD-10-CM

## 2023-05-24 DIAGNOSIS — R29.898 DECREASED STRENGTH OF TRUNK AND BACK: ICD-10-CM

## 2023-05-24 DIAGNOSIS — R52 ALTERATION IN MOBILITY ASSOCIATED WITH PAIN: ICD-10-CM

## 2023-05-24 DIAGNOSIS — M54.9 DORSALGIA, UNSPECIFIED: ICD-10-CM

## 2023-05-24 PROCEDURE — 97161 PT EVAL LOW COMPLEX 20 MIN: CPT

## 2023-05-24 PROCEDURE — 97530 THERAPEUTIC ACTIVITIES: CPT

## 2023-05-24 PROCEDURE — 97112 NEUROMUSCULAR REEDUCATION: CPT

## 2023-05-24 NOTE — PLAN OF CARE
[facility]  Physical Therapy Lumbar Evaluation      Name: Radha Tabor  Clinic Number: [id number]    Therapy Diagnosis:   Encounter Diagnoses   Name Primary?    Dorsalgia, unspecified     Spondylosis without myelopathy or radiculopathy, lumbosacral region     Myofascial pain     Decreased strength of trunk and back     Alteration in mobility associated with pain      Physician: Mariya Cheema, NP    Physician Orders: PT Eval and Treat  Medical Diagnosis from Referral:   M54.9 (ICD-10-CM) - Dorsalgia, unspecified    M47.817 (ICD-10-CM) - Spondylosis without myelopathy or radiculopathy, lumbosacral region    M79.18 (ICD-10-CM) - Myofascial pain  Evaluation Date: 5/24/2023  Authorization Period Expiration: 06/24/2023   Plan of Care Expiration: 8/2/2023  Reassessment Due: 6/24/2023  Visit # / Visits authorized: 1/1    Time In: 10:00  Time Out: 11:20  Total Billable Time: 80 minutes  INSURANCE and OUTCOMES: Value Based Insurance with FOTO Outcomes 1/3    Precautions: standard    Pattern of pain determined: 1 PEP    Subjective     Date of onset/History of current condition: Radha reports: she just got lazy, she is a forever athlete stopped working out and used to  and that would keep her somewhat moving. 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 She feels like she is in such bad shape she can't do anything anymore. The back pain is constant an aching. If she is sleeping on her side and starts having pain when she goes to turn over she feels like she needs help to turn. The only issue she had when a senior in college she went to a chiro to get the back cracked. Family recognizes that she grunts and groans when moving. Cannot wear heels. 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 Tabor is a 48 year old female here for evaluation of low back pain     C/o bandlike low back pain for the past 4 years  Denies leg pain or numbness/tingling  Worse with  turning in bed and sit to stand  No PT or injections in the past  Not interested in medications          Medical History:   Past Medical History:   Diagnosis Date    Acute hepatitis C without mention of hepatic coma(070.51)     Endometriosis, site unspecified     Viral hepatitis C 6/18/2016    Viral hepatitis C 6/18/2016       Surgical History:   Radha Tabor  has a past surgical history that includes Nasal septum surgery; Hysteroscopy; Dx scope (10/16/2012); and Laser laparoscopy.    Medications:   Radha has a current medication list which includes the following prescription(s): azelastine, fexofenadine, fluticasone propionate, ipratropium, meloxicam, mupirocin, olopatadine, and valacyclovir.    Allergies:   Review of patient's allergies indicates:   Allergen Reactions    Hay fever and allergy relief Hives and Itching        Imaging:   Lumbar xray 2021     FINDINGS:  No significant alignment abnormality.  Vertebral body heights are normally maintained, without compression deformity at any level.  No definite spondylitic defects.  No disc narrowing.  Vertebral endplates are well defined.  No osseous destructive process.  SI joints appear unremarkable.     Impression:     No significant abnormality identified.    Prior Therapy: None  Prior Treatment: Chiro back in college  Social History:  lives with their family; bedroom and office is upstairs in home  Occupation:  Does administrative desk work  Leisure:  Likes [employer] and [employer]  Prior Level of Function: I with ADL  Current Level of Function: I with ADL but has pain  DME owned/used: wait , hands weights  Gym Membership: yes    Pain:  Current 6/10, worst 8/10, best 3/10   Location:  across the lover back  Description: aching, pressure, stinging  Aggravating Factors:  Transferring from sit to stand, turning over in bed  Easing Factors:  waist traininer  Disturbed Sleep: will wake up with pain    Pattern of pain questions:  1.  Where is your pain the  worst? back  2.  Is your pain constant or intermittent? constant  3.  Does bending forward make your typical pain worse? yes  4.  Since the start of your back pain, has there been a change in your bowel or bladder? none  5.  What can't you do now that you use to be able to do? Turning over in bed, transferring sit to stand    Pts goals: get stronger    Red Flag Screening:   Cough/Sneeze Strain: (+)  Bladder/Bowel: (--)  Falls: (--)  Night pain: (--)  Unexplained weight loss: (--)  General health: good    Objective      Postural examination/scapula alignment: Rounded shoulder  Joint integrity: Firm end feeling  Skin integrity: intact    Edema: none noted  Sitting: Poor Fair  Standing: Good  Correction of posture: better with lumbar roll    MOVEMENT LOSS - Lumbar   Norms ROM Loss Initial   Flexion Fingers touch toes, sacral angle >/= 70 deg, uniform spinal curvature, posterior weight shift  moderate loss   Extension ASIS surpasses toes, spine of scapulae surpasses heels, uniform spinal curve within functional limits   Side glide Right  within functional limits   Side glide Left  within functional limits   Rotation Right PT observes contralateral shoulder within functional limits   Rotation Left PT observes contralateral shoulder within functional limits     Lower Extremity Strength  Right LE  Left LE    Hip flexion: 4/5 Hip flexion: 4/5   Hip extension:  4/5 Hip extension: 4/5   Hip abduction: 4/5 Hip abduction: 4/5   Hip adduction:  4/5 Hip adduction:  4/5   Knee Flexion 4+/5 Knee Flexion 4+/5   Knee Extension 4+/5 Knee Extension 4+/5   Ankle dorsiflexion: 5/5 Ankle dorsiflexion: 5/5   Ankle plantarflexion: 5/5 Ankle plantarflexion: 5/5     GAIT:  Assistive Device used: none  Level of Assistance: independent  Patient displays the following gait deviations: no gait deviations observed.     Special Tests:   Test Name  Test Result   Prone Instability Test (--)   SI Joint Provocation Test (--)   Straight Leg Raise (--)    Neural Tension Test (--)   Crossed Straight Leg Raise (--)     Bridge P!  MANNY L : hip pain  LLD: L appears shorter (not corrected with METs - but easier to do the bridges)    NEUROLOGICAL SCREENING:     Sensory deficits: intact to light touch    Reflexes:    Left Right   Patella Tendon 1+ 1+   Clonus (--) (--)     REPEATED TEST MOVEMENTS:    Baseline symptoms:  Repeated Flexion in Standing no effect   Repeated Extension in Standing better   Repeated Flexion in lying no effect   Repeated Extension in lying  no effect     LTR: Better  Pelvic tilt: better    STATIC TESTS and other movements:   Prone lie better   Prone lie on elbows better   Sitting slouched  worse   Sitting erect better       Baseline Isometric Testing on Med X equipment: Testing administered by PT    Date of testin2023  ROM 0-54 deg   Max Peak Torque 85    Min Peak Torque 39   Flex/Ext Ratio 2.17   % below normative data 54     Limitation/Restriction for FOTO Lumbar Survey    Therapist reviewed FOTO scores for Radha Tabor on 2023.   FOTO documents entered into EPIC - see Media section.    Visit 1 Score: 14%  Visit 5 Score:   Visit 10 Score:   Discharge Score:   Goal Score: 5%       Treatment     Treatment Time In: 10:50  Treatment Time Out: 11:20  Total Treatment time separate from Evaluation: 30 minutes      Written Home Exercises Provided: yes.    HEP AS FOLLOWS:  Prone on elbows  Extension in Lying  Posterior pelvic tilt   Posterior pelvic tilt + march  Extension in standing       Exercises were reviewed and Radha was able to demonstrate them prior to the end of the session.  Radha demonstrated good  understanding of the education provided.     See EMR under Patient Instructions for exercises provided 2023.    Radha received neuromuscular education to engage spinal musculature correctly for motor control and engagement of musculature for 15 minutes including the MedX exercise component and practice and standard testing. MedX  dynamic exercise and baseline isometric test performed with instructions to guide the patient safely through the testing procedure. Patient instructed to perform isometric test correctly and safely while building to an optimal force with a pain-free effort. Patient also instructed that she should feel support/pressure from MedX restraints but no pain/discomfort. Patient demonstrated appropriate understanding of information.     [facility] 5/24/2023   Visit Number 1   VAS Pain Rating 6   Lumbar Stretches - Slouch Overcorrection 10   Extension in Lying 10   Extension in Standing 10   Lumbar Extension Seat Pad 0   Femur Restraint 6   Top Dead Center 24   Counterweight 174   Lumbar Flexion 54   Lumbar Extension 0   Lumbar Peak Torque 85   Min Torque 39   Test Percent Below Normative Data 54   Ice - Z Lie (in min.) 10        Therapeutic Education/Activity provided for 15 minutes:   - Patient was given an [facility] Visit 1 handout which discusses the following:  - what to expect in therapy  - an overview of the program, including health coaching and wellness  - importance of spinal hygiene, proper posture, lifting mechanics, sleep quality, and nutrition/hydration   - Tereso roll trialed, recommended, and purchase information was provided.  - Patient received a handout regarding anticipated muscular soreness following the isometric test and strategies for management were reviewed with patient including stretching, using ice and scheduled rest.   - Patient received verbal education on the following:   - [facility] program,   - purpose of the isometric test,   - safe progression of lumbar strengthening, wellness approach, and systemic strengthening.   - safe usage of MedX machine and testing protocols.    Radha received cold pack for 10 minutes to low back in Z-lie.    Assessment     Radha is a 48 y.o. female referred to [facility] with a medical diagnosis of Dorsalgia, unspecified,  Spondylosis without myelopathy or radiculopathy, lumbosacral region, Myofascial pain. Pt presents with complaints of low back pain that has gotten progressively worse over the past 4 years since her activity level has decreased and she is in a more sedentary role. She has fair ROM with most limitation and pain in flexion and no change with repeated flexion, she has a leg length discrepancy with the L leg appearing shorter, it was not changed with METs but her pain level changed when competing a bridges. She also has pain on the L with MANNY that did not change with METs. She was able to tolerate extension and prone on elbows and reports the pain abolished in that position. Pt instructed to completed stretched in extension at home, education on benefits of completing extension after prolonged flexion as well at stabilization exercises at the trunk especially when turning over in bed. She was able to test on the lumbar medx and is 54% below strength averages    Pain Pattern: 1 PEP       Pt prognosis is Excellent.     Pt will benefit from skilled outpatient Physical Therapy to address the deficits stated above and in the chart below, to provide pt/family education, and to maximize pt's level of independence. Based on the above history and physical examination an active physical therapy program is recommended.      Plan of care discussed with patient: Yes  Pt's spiritual, cultural and educational needs considered and patient is agreeable to the plan of care and goals as stated below:     Anticipated Barriers for therapy: none    PT Evaluation Completed? Yes    Medical necessity is demonstrated by the following problem list:    History  Co-morbidities and personal factors that may impact the plan of care Co-morbidities:   none    Personal Factors:   lifestyle     low   Examination  Body Structures and Functions, activity limitations and participation restrictions that may impact the plan of care Body Regions:    back    Body Systems:    gross symmetry  ROM  strength  gross coordinated movement  transfers  transitions  motor control    Participation Restrictions:   none    Activity limitations:   Learning and applying knowledge  no deficits    General Tasks and Commands  no deficits    Communication  no deficits    Mobility  lifting and carrying objects  walking    Self care  no deficits    Domestic Life  doing house work (cleaning house, washing dishes, laundry)    Interactions/Relationships  no deficits    Life Areas  no deficits    Community and Social Life  no deficits         low   Clinical Presentation stable and uncomplicated low   Decision Making/ Complexity Score: low       GOALS: Pt is in agreement with the following goals.    Short term goals:  6 weeks or 10 visits   - Pt will demonstrate increased lumbar ROM by at least 3 degrees from the initial ROM value with improvements noted in functional ROM and ability to perform ADLs. Appropriate and Ongoing  - Pt will demonstrate increased MedX average isometric strength value by 15% from initial test resulting in improved ability to perform bending, lifting, and carrying activities safely, confidently. Appropriate and Ongoing  - Pt will report a reduction in worst pain score by 1-2 points for improved tolerance for transitional movements. Appropriate and Ongoing  - Pt able to perform HEP correctly with minimal cueing or supervision from therapist to encourage independent management of symptoms. Appropriate and Ongoing    Long term goals: 10 weeks or 20 visits   - Pt will demonstrate increased lumbar ROM by at least 6 degrees from initial ROM value, resulting in improved ability to perform functional forward bending while standing and sitting. Appropriate and Ongoing  - Pt will demonstrate increased MedX average isometric strength value by 30% from initial test resulting in improved ability to perform bending, lifting, and carrying activities safely and confidently.  Appropriate and Ongoing  - Pt to demonstrate ability to independently control and reduce their pain through posture positioning and mechanical movements throughout a typical day. Appropriate and Ongoing  - Pt will demonstrate reduced pain and improved functional outcomes as reported on the FOTO by reaching a limitation score of < or = 5% or less in order to demonstrate subjective improvement in pt's condition.   Appropriate and Ongoing  - Pt will demonstrate independence with the HEP at discharge. Appropriate and Ongoing  - Pt will be able to turn over in bed without increase in low back pain (patient goal) Appropriate and Ongoing  - Pt will be able to transfer from sit to stand without increase in pain (patient goal) Appropriate and Ongoing      Plan     Outpatient physical therapy 2x week for 10 weeks or 20 visits to include the following:   - Patient education  - Therapeutic exercise  - Manual therapy  - Performance testing   - Neuromuscular Re-education  - Therapeutic activity   - Modalities    Pt may be seen by PTA as part of the rehabilitation team.     Therapist: Gagan Oliver, PT  5/24/2023

## 2023-05-24 NOTE — PROGRESS NOTES
See plan of care for evaluation and treatment.     Thank you for the referral of this patient.

## 2023-06-07 ENCOUNTER — CLINICAL SUPPORT (OUTPATIENT)
Dept: REHABILITATION | Facility: OTHER | Age: 48
End: 2023-06-07
Payer: COMMERCIAL

## 2023-06-07 DIAGNOSIS — R52 ALTERATION IN MOBILITY ASSOCIATED WITH PAIN: ICD-10-CM

## 2023-06-07 DIAGNOSIS — Z78.9 ALTERATION IN MOBILITY ASSOCIATED WITH PAIN: ICD-10-CM

## 2023-06-07 DIAGNOSIS — R29.898 DECREASED STRENGTH OF TRUNK AND BACK: Primary | ICD-10-CM

## 2023-06-07 PROCEDURE — 97112 NEUROMUSCULAR REEDUCATION: CPT | Mod: CQ

## 2023-06-07 PROCEDURE — 97530 THERAPEUTIC ACTIVITIES: CPT | Mod: CQ

## 2023-06-07 PROCEDURE — 97110 THERAPEUTIC EXERCISES: CPT | Mod: CQ

## 2023-06-07 NOTE — PROGRESS NOTES
[facility]  Physical Therapy Treatment Note     Name: Radha Tabor  Clinic Number: [id number]    Therapy Diagnosis:   Encounter Diagnoses   Name Primary?    Decreased strength of trunk and back Yes    Alteration in mobility associated with pain      Physician: Mariya Cheema NP    Visit Date: 2023    Physician Orders: PT Eval and Treat  Medical Diagnosis from Referral:   M54.9 (ICD-10-CM) - Dorsalgia, unspecified    M47.817 (ICD-10-CM) - Spondylosis without myelopathy or radiculopathy, lumbosacral region    M79.18 (ICD-10-CM) - Myofascial pain  Evaluation Date: 2023  Authorization Period Expiration: 2023   Plan of Care Expiration: 2023  Reassessment Due: 2023  Visit # / Visits authorized:      Time In: 2:05pm  Time Out: 3:00 pm  Total Billable Time: 55 minutes  INSURANCE and OUTCOMES: Value Based Insurance with FOTO Outcomes 1/3     Precautions: standard     Pattern of pain determined: 1 PEP    Subjective     Radha reports she is having more pain when sitting, which she does for the majority of her work day. Pt states she takes standing EIS breaks throughout the day, which temporarily relieves her pain. Pt is compliant with HEP stretches, but not with HEP strengthening.     Patient reports tolerating previous visit without adverse effects.   Patient reports their pain to be 4/10 on a 0-10 scale with 0 being no pain and 10 being the worst pain imaginable.  Pain Location: [facility]     Occupation:  Does administrative desk work  Leisure:  Likes [employer] and [email or web address]    Pts goals: get stronger  Objective    Baseline Isometric Testing on Med X equipment: Testing administered by PT     Date of testin2023  ROM 0-54 deg   Max Peak Torque 85    Min Peak Torque 39   Flex/Ext Ratio 2.17   % below normative data 54      Limitation/Restriction for FOTO Lumbar Survey     Therapist reviewed FOTO scores for Radha Tabor on 2023.    FOTO documents entered into [employer] - see Media section.     Visit 1 Score: 14%  Visit 5 Score:   Visit 10 Score:   Discharge Score:   Goal Score: 5%        Treatment     Radha received the treatments listed below:      Radha received neuromuscular education for 10 minutes via participation on the [employer] Machine. Therapist assisted patient in isolating and engaging spinal stabilization musculature in order to improve functional ability and postural control. Patient performed exercise with therapist guidance in order to accurately use pacer function, avoid valsalva, and optimally exert effort within a safe and effective range via the Fer Exertion Rating Scale. Patient instructed to perform at a midrange of exertion and to complete 15-20 repetitions within appropriate split time, with proper technique, and while maintaining safety.     [facility] - Short 6/7/2023   Visit Number 2   VAS Pain Rating 4   Time 5   Lumbar Stretches - Slouch -   Extension in Lying 10   Extension in Standing 10   Lumbar Extension - Seat Pad -   Femur Restraint -   Top Dead Center -   Counterweight -   Lumbar Flexion -   Lumbar Extension -   Lumbar Peak Torque -   Lumbar Weight 42   Repetitions 18   Rating of Perceived Exertion 5            Radha participated in neuromuscular re-education activities to improve balance, coordination, proprioception, motor control and/or posture for  minutes. The following activities were included:        Radha participated in therapeutic exercises to develop  for 20 minutes including:    Prone on elbows 1 min  Extension in Lying x10   Extension in standing x10  +Supine hip flexor stretch 2x1 min (hug R knee in when stretching LLE)        Peripheral muscle strengthening which included one set of 15-20 repetitions at a slow and controlled 10-13 second per rep pace focused on strengthening supporting musculature in order to improve body mechanics and functional mobility. Patient and therapist focused  "on proper form during treatment to ensure optimal strengthening of each targeted muscle group.  Machines utilized include torso rotation, leg extension, leg curl, chest press, upright row. Tricep extension, bicep curl, leg press, and hip abduction added visit 3    Radha participated in dynamic functional therapeutic activities to improve functional performance and simulate household and community activities for 25 minutes. The following activities were included:    Posterior pelvic tilt ,x10,3"   Posterior pelvic tilt + march x10  +TrA activation w/ball + SLR x10   +Education on TrA contraction with functional activities and A&P   +Education on purpose of postural strengthening   +Lumbar roll fitting   +DOMS     Radha received manual therapy techniques for   minutes. The following activities were included:        Pt given cold pack for 5 minutes to LB in Z-lie    Patient Education and Home Exercises     Home exercises include:  Prone on elbows  Extension in Lying  Posterior pelvic tilt   Posterior pelvic tilt + march  Extension in standing   Cardio program (V5): -  Lifting education (V11): -  Posture/Lumbar roll: Has no yet obtained  [employer] Discharge handout (date given): -  Equipment at home/gym membership:     Education provided:   - PT role and POC  - HEP      Written Home Exercises Provided: Patient instructed to cont prior HEP.  Exercises were reviewed and Radha was able to demonstrate them prior to the end of the session.  Radha demonstrated  understanding of the education provided.     See EMR under  for exercises provided     Assessment   Pt presents to second healthy back visit reporting less back pain, was able to demo HEP with Min VC for form. Pt was able to start strengthening and endurance training on the lumbar MedX at 50% of max peak torque according to the initial visit isometric test. Pt was able to complete 18 reps, with 5/10. RPE. Pt was also able to complete half of the peripheral " strengthening exercises without increased discomfort and will complete the complete circuit next visit as tolerated.       Patient is making good progress towards established goals.  Pt will continue to benefit from skilled outpatient physical therapy to address the deficits stated in the impairment chart, provide pt/family education and to maximize pt's level of independence in the home and community environment.     Pt prognosis is Excellent    Anticipated Barriers for therapy: None  Pt's spiritual, cultural and educational needs considered and pt agreeable to plan of care and goals as stated below:     Goals:    Pt is in agreement with the following goals.     Short term goals:  6 weeks or 10 visits   - Pt will demonstrate increased lumbar ROM by at least 3 degrees from the initial ROM value with improvements noted in functional ROM and ability to perform ADLs. Appropriate and Ongoing  - Pt will demonstrate increased MedX average isometric strength value by 15% from initial test resulting in improved ability to perform bending, lifting, and carrying activities safely, confidently. Appropriate and Ongoing  - Pt will report a reduction in worst pain score by 1-2 points for improved tolerance for transitional movements. Appropriate and Ongoing  - Pt able to perform HEP correctly with minimal cueing or supervision from therapist to encourage independent management of symptoms. Appropriate and Ongoing     Long term goals: 10 weeks or 20 visits   - Pt will demonstrate increased lumbar ROM by at least 6 degrees from initial ROM value, resulting in improved ability to perform functional forward bending while standing and sitting. Appropriate and Ongoing  - Pt will demonstrate increased MedX average isometric strength value by 30% from initial test resulting in improved ability to perform bending, lifting, and carrying activities safely and confidently. Appropriate and Ongoing  - Pt to demonstrate ability to independently  control and reduce their pain through posture positioning and mechanical movements throughout a typical day. Appropriate and Ongoing  - Pt will demonstrate reduced pain and improved functional outcomes as reported on the FOTO by reaching a limitation score of < or = 5% or less in order to demonstrate subjective improvement in pt's condition.   Appropriate and Ongoing  - Pt will demonstrate independence with the HEP at discharge. Appropriate and Ongoing  - Pt will be able to turn over in bed without increase in low back pain (patient goal) Appropriate and Ongoing  - Pt will be able to transfer from sit to stand without increase in pain (patient goal) Appropriate and Ongoing       Plan   Outpatient physical therapy 2x week for 10 weeks or 20 visits to include the following:   - Patient education  - Therapeutic exercise  - Manual therapy  - Performance testing   - Neuromuscular Re-education  - Therapeutic activity   - Modalities     Pt may be seen by PTA as part of the rehabilitation team.     Therapist: Diamond Liu PTA  6/7/2023

## 2023-06-26 ENCOUNTER — CLINICAL SUPPORT (OUTPATIENT)
Dept: REHABILITATION | Facility: OTHER | Age: 48
End: 2023-06-26
Payer: COMMERCIAL

## 2023-06-26 DIAGNOSIS — R29.898 DECREASED STRENGTH OF TRUNK AND BACK: Primary | ICD-10-CM

## 2023-06-26 DIAGNOSIS — Z78.9 ALTERATION IN MOBILITY ASSOCIATED WITH PAIN: ICD-10-CM

## 2023-06-26 DIAGNOSIS — R52 ALTERATION IN MOBILITY ASSOCIATED WITH PAIN: ICD-10-CM

## 2023-06-26 PROCEDURE — 97112 NEUROMUSCULAR REEDUCATION: CPT

## 2023-06-26 PROCEDURE — 97110 THERAPEUTIC EXERCISES: CPT

## 2023-06-26 NOTE — PROGRESS NOTES
[facility]  Physical Therapy Treatment Note     Name: Radha Tabor  Clinic Number: [id number]    Therapy Diagnosis:   Encounter Diagnoses   Name Primary?    Decreased strength of trunk and back Yes    Alteration in mobility associated with pain      Physician: Mariya Cheema NP    Visit Date: 2023    Physician Orders: PT Eval and Treat  Medical Diagnosis from Referral:   M54.9 (ICD-10-CM) - Dorsalgia, unspecified    M47.817 (ICD-10-CM) - Spondylosis without myelopathy or radiculopathy, lumbosacral region    M79.18 (ICD-10-CM) - Myofascial pain  Evaluation Date: 2023  Authorization Period Expiration: 2023  Plan of Care Expiration: 2023  Reassessment Due: 2023  Visit # / Visits authorized: 3/20     Time In: 2:00 pm  Time Out: 2:40 pm  Total Billable Time: 40 minutes  INSURANCE and OUTCOMES: Value Based Insurance with FOTO Outcomes /3     Precautions: standard     Pattern of pain determined: 1 PEP    Subjective     Radha reports she has been traveling a lot and not keeping up with the exercises, she does the extension exercises a lot other than the standing exercises.       Patient reports tolerating previous visit without adverse effects.   Patient reports their pain to be 4/10 on a 0-10 scale with 0 being no pain and 10 being the worst pain imaginable.  Pain Location: [facility]     Occupation:  Does administrative desk work  Leisure:  Likes [employer] and [employer]    Pts goals: get stronger  Objective    Baseline Isometric Testing on Med X equipment: Testing administered by PT     Date of testin2023  ROM 0-54 deg   Max Peak Torque 85    Min Peak Torque 39   Flex/Ext Ratio 2.17   % below normative data 54      Limitation/Restriction for FOTO Lumbar Survey     Therapist reviewed FOTO scores for Radha Tabor on 2023.   FOTO documents entered into [employer] - see Media section.     Visit 1 Score: 14%  Visit 5 Score:   Visit 10  Score:   Discharge Score:   Goal Score: 5%        Treatment     Radha received the treatments listed below:      Radha received neuromuscular education for 10 minutes via participation on the [employer] Machine. Therapist assisted patient in isolating and engaging spinal stabilization musculature in order to improve functional ability and postural control. Patient performed exercise with therapist guidance in order to accurately use pacer function, avoid valsalva, and optimally exert effort within a safe and effective range via the Fer Exertion Rating Scale. Patient instructed to perform at a midrange of exertion and to complete 15-20 repetitions within appropriate split time, with proper technique, and while maintaining safety.     [facility] 6/26/2023   Visit Number 3   VAS Pain Rating -   Time -   Lumbar Stretches - Slouch Overcorrection -   Extension in Lying 20   Extension in Standing 10   Lumbar Extension Seat Pad -   Test Percent Below Normative Data -   Lumbar Weight 42   Repetitions 20   Rating of Perceived Exertion 4   Ice - Z Lie (in min.) 5          Radha participated in neuromuscular re-education activities to improve balance, coordination, proprioception, motor control and/or posture for  minutes. The following activities were included:        Radha participated in therapeutic exercises to develop  for 20 minutes including:    Prone on elbows 1 min  Extension in Lying 2x10   Extension in standing x10    Peripheral muscle strengthening which included one set of 15-20 repetitions at a slow and controlled 10-13 second per rep pace focused on strengthening supporting musculature in order to improve body mechanics and functional mobility. Patient and therapist focused on proper form during treatment to ensure optimal strengthening of each targeted muscle group.  Machines utilized include torso rotation, leg extension, leg curl, chest press, upright row. Tricep extension, bicep curl, leg press, and  "hip abduction added visit 3    Not performed 6/26/2023 :    Supine hip flexor stretch 2x1 min (hug R knee in when stretching LLE)  Posterior pelvic tilt ,x10,3"   Posterior pelvic tilt + march x10  TrA activation w/ball + SLR x10   Did not complete due to time constraints, Will Resume next visit        Radha participated in dynamic functional therapeutic activities to improve functional performance and simulate household and community activities for 10 minutes. The following activities were included:      Benefits of slow controled movement and extensions pause on Lumbar medx for strength and endurance  Benefits of completing exercises at home for the most benefit in the program    Not performed 6/26/2023 :    Education on TrA contraction with functional activities and A&P   Education on purpose of postural strengthening   Lumbar roll fitting   DOMS        Radha received manual therapy techniques for   minutes. The following activities were included:        Pt given cold pack for 5 minutes to LB in Z-lie    Patient Education and Home Exercises     Home exercises include:  Prone on elbows  Extension in Lying  Posterior pelvic tilt   Posterior pelvic tilt + march  Extension in standing   Cardio program (V5): -  Lifting education (V11): -  Posture/Lumbar roll: Has no yet obtained  [employer] Discharge handout (date given): -  Equipment at home/gym membership:     Education provided:   - PT role and POC  - HEP      Written Home Exercises Provided: Patient instructed to cont prior HEP.  Exercises were reviewed and Radha was able to demonstrate them prior to the end of the session.  Radha demonstrated  understanding of the education provided.     See EMR under  for exercises provided     Assessment     Pt presents to visit with some complaints of low back pain and not completing exercises at home. Pt educated on the benefits of keeping up with the exercises at home for maximum benefit of the program. Due to time " "constraints were not able to complete all exercises but plan to resume next visit. Pt required cues for relaxing the gluts when completing press ups and the difference between mobility and strengthening exercises, she may not "FEEL" her back muscle working but there is a benefit in mobility. Maintained resistance on the lumbar medx and she was able to complete 20 reps, with 4/10. RPE. Pt was also able to complete all of the peripheral strengthening exercises without increased discomfort and some challenge.      Patient is making good progress towards established goals.  Pt will continue to benefit from skilled outpatient physical therapy to address the deficits stated in the impairment chart, provide pt/family education and to maximize pt's level of independence in the home and community environment.     Pt prognosis is Excellent    Anticipated Barriers for therapy: None  Pt's spiritual, cultural and educational needs considered and pt agreeable to plan of care and goals as stated below:     Goals:    Pt is in agreement with the following goals.     Short term goals:  6 weeks or 10 visits   - Pt will demonstrate increased lumbar ROM by at least 3 degrees from the initial ROM value with improvements noted in functional ROM and ability to perform ADLs. Appropriate and Ongoing  - Pt will demonstrate increased MedX average isometric strength value by 15% from initial test resulting in improved ability to perform bending, lifting, and carrying activities safely, confidently. Appropriate and Ongoing  - Pt will report a reduction in worst pain score by 1-2 points for improved tolerance for transitional movements. Appropriate and Ongoing  - Pt able to perform HEP correctly with minimal cueing or supervision from therapist to encourage independent management of symptoms. Appropriate and Ongoing     Long term goals: 10 weeks or 20 visits   - Pt will demonstrate increased lumbar ROM by at least 6 degrees from initial ROM " value, resulting in improved ability to perform functional forward bending while standing and sitting. Appropriate and Ongoing  - Pt will demonstrate increased MedX average isometric strength value by 30% from initial test resulting in improved ability to perform bending, lifting, and carrying activities safely and confidently. Appropriate and Ongoing  - Pt to demonstrate ability to independently control and reduce their pain through posture positioning and mechanical movements throughout a typical day. Appropriate and Ongoing  - Pt will demonstrate reduced pain and improved functional outcomes as reported on the FOTO by reaching a limitation score of < or = 5% or less in order to demonstrate subjective improvement in pt's condition.   Appropriate and Ongoing  - Pt will demonstrate independence with the HEP at discharge. Appropriate and Ongoing  - Pt will be able to turn over in bed without increase in low back pain (patient goal) Appropriate and Ongoing  - Pt will be able to transfer from sit to stand without increase in pain (patient goal) Appropriate and Ongoing       Plan   Outpatient physical therapy 2x week for 10 weeks or 20 visits to include the following:   - Patient education  - Therapeutic exercise  - Manual therapy  - Performance testing   - Neuromuscular Re-education  - Therapeutic activity   - Modalities     Pt may be seen by PTA as part of the rehabilitation team.     Therapist: Gagan Oliver, PT  6/26/2023

## 2023-06-28 ENCOUNTER — CLINICAL SUPPORT (OUTPATIENT)
Dept: REHABILITATION | Facility: OTHER | Age: 48
End: 2023-06-28
Payer: COMMERCIAL

## 2023-06-28 DIAGNOSIS — R52 ALTERATION IN MOBILITY ASSOCIATED WITH PAIN: ICD-10-CM

## 2023-06-28 DIAGNOSIS — R29.898 DECREASED STRENGTH OF TRUNK AND BACK: Primary | ICD-10-CM

## 2023-06-28 DIAGNOSIS — Z78.9 ALTERATION IN MOBILITY ASSOCIATED WITH PAIN: ICD-10-CM

## 2023-06-28 PROCEDURE — 97112 NEUROMUSCULAR REEDUCATION: CPT

## 2023-06-28 PROCEDURE — 97110 THERAPEUTIC EXERCISES: CPT

## 2023-06-28 NOTE — PROGRESS NOTES
[facility]  Physical Therapy Treatment Note     Name: Radha Tabor  Clinic Number: [id number]    Therapy Diagnosis:   Encounter Diagnoses   Name Primary?    Decreased strength of trunk and back Yes    Alteration in mobility associated with pain      Physician: Mariya Cheema NP    Visit Date: 2023    Physician Orders: PT Eval and Treat  Medical Diagnosis from Referral:   M54.9 (ICD-10-CM) - Dorsalgia, unspecified    M47.817 (ICD-10-CM) - Spondylosis without myelopathy or radiculopathy, lumbosacral region    M79.18 (ICD-10-CM) - Myofascial pain  Evaluation Date: 2023  Authorization Period Expiration: 2023  Plan of Care Expiration: 2023  Reassessment Due: 2023  Visit # / Visits authorized:      Time In: 11:30  Time Out: 12:35  Total Billable Time: 60 minutes  INSURANCE and OUTCOMES: Value Based Insurance with FOTO Outcomes /3     Precautions: standard     Pattern of pain determined: 1 PEP    Subjective     Radha reports she is feeling ok right now and has the most disocmfort when sitting in her chair because the way it sinks/dips.      Patient reports tolerating previous visit without adverse effects.   Patient reports their pain to be 4/10 on a 0-10 scale with 0 being no pain and 10 being the worst pain imaginable.  Pain Location: [facility]     Occupation:  Does administrative desk work  Leisure:  Likes [employer] and [employer] movies    Pts goals: get stronger  Objective    Baseline Isometric Testing on Med X equipment: Testing administered by PT     Date of testin2023  ROM 0-54 deg   Max Peak Torque 85    Min Peak Torque 39   Flex/Ext Ratio 2.17   % below normative data 54      Limitation/Restriction for FOTO Lumbar Survey     Therapist reviewed FOTO scores for Radha Tabor on 2023.   FOTO documents entered into [employer] - see Media section.     Visit 1 Score: 14%  Visit 5 Score:   Visit 10 Score:   Discharge Score:   Goal Score:  "5%        Treatment     Radha received the treatments listed below:      Radha received neuromuscular education for 10 minutes via participation on the [employer] Machine. Therapist assisted patient in isolating and engaging spinal stabilization musculature in order to improve functional ability and postural control. Patient performed exercise with therapist guidance in order to accurately use pacer function, avoid valsalva, and optimally exert effort within a safe and effective range via the Fer Exertion Rating Scale. Patient instructed to perform at a midrange of exertion and to complete 15-20 repetitions within appropriate split time, with proper technique, and while maintaining safety.     [facility] 6/28/2023   Visit Number 4   VAS Pain Rating 0   Treadmill Time (in min.) 5   Time -   Lumbar Stretches - Slouch Overcorrection -   Extension in Lying 20   Extension in Standing -   Test Percent Below Normative Data -   Lumbar Weight 44   Repetitions 15   Rating of Perceived Exertion 4   Ice - Z Lie (in min.) 5          Radha participated in neuromuscular re-education activities to improve balance, coordination, proprioception, motor control and/or posture for  minutes. The following activities were included:        Radha participated in therapeutic exercises to develop  for 20 minutes including:    Prone on elbows 1 min  Extension in Lying 2x10   +Prone hip extension 2x10  Extension in standing x10  Posterior pelvic tilt ,x10,3"   Posterior pelvic tilt + march x10  +Glut bridge 5", 2x10  Supine hip flexor stretch 1 min with strap    Peripheral muscle strengthening which included one set of 15-20 repetitions at a slow and controlled 10-13 second per rep pace focused on strengthening supporting musculature in order to improve body mechanics and functional mobility. Patient and therapist focused on proper form during treatment to ensure optimal strengthening of each targeted muscle group.  Machines utilized " include torso rotation, leg extension, leg curl, chest press, upright row. Tricep extension, bicep curl, leg press, and hip abduction added visit 3    Not performed 6/28/2023 :    Supine hip flexor stretch 2x1 min (hug R knee in when stretching LLE)    TrA activation w/ball + SLR x10   Did not complete due to time constraints, Will Resume next visit        Radha participated in dynamic functional therapeutic activities to improve functional performance and simulate household and community activities for 10 minutes. The following activities were included:    Postural training with the use of a towel at the ischial tub and lumbar roll for       Benefits of slow controled movement and extensions pause on Lumbar medx for strength and endurance  Benefits of completing exercises at home for the most benefit in the program    Not performed 6/28/2023 :    Education on TrA contraction with functional activities and A&P   Education on purpose of postural strengthening   Lumbar roll fitting   DOMS        Radha received manual therapy techniques for   minutes. The following activities were included:        Pt given cold pack for 5 minutes to LB in Z-lie    Patient Education and Home Exercises     Home exercises include:  Prone on elbows  Extension in Lying  Posterior pelvic tilt   Posterior pelvic tilt + march  Extension in standing   Cardio program (V5): -  Lifting education (V11): -  Posture/Lumbar roll: Has no yet obtained  [employer] Discharge handout (date given): -  Equipment at home/gym membership:     Education provided:   - PT role and POC  - HEP      Written Home Exercises Provided: Patient instructed to cont prior HEP.  Exercises were reviewed and Radha was able to demonstrate them prior to the end of the session.  Radha demonstrated  understanding of the education provided.     See EMR under  for exercises provided     Assessment     Pt presents to visit with some complaints of low back pain primarily when sitting.  Discussed and educated how using towel in the seat at the ischial Tuberosity and a lumbar roll at the low back and can help with support at the lumbar spine and decrease postural imbalance in sitting. Resumed stretches and added prone and supine glut and lumbar extension stability exercises with some challenge. Increased resistance on the lumbar medx and she was able to complete 15 repetitions at 410 RPE, plan to progress as tolerated.         Patient is making good progress towards established goals.  Pt will continue to benefit from skilled outpatient physical therapy to address the deficits stated in the impairment chart, provide pt/family education and to maximize pt's level of independence in the home and community environment.     Pt prognosis is Excellent    Anticipated Barriers for therapy: None  Pt's spiritual, cultural and educational needs considered and pt agreeable to plan of care and goals as stated below:     Goals:    Pt is in agreement with the following goals.     Short term goals:  6 weeks or 10 visits   - Pt will demonstrate increased lumbar ROM by at least 3 degrees from the initial ROM value with improvements noted in functional ROM and ability to perform ADLs. Appropriate and Ongoing  - Pt will demonstrate increased MedX average isometric strength value by 15% from initial test resulting in improved ability to perform bending, lifting, and carrying activities safely, confidently. Appropriate and Ongoing  - Pt will report a reduction in worst pain score by 1-2 points for improved tolerance for transitional movements. Appropriate and Ongoing  - Pt able to perform HEP correctly with minimal cueing or supervision from therapist to encourage independent management of symptoms. Appropriate and Ongoing     Long term goals: 10 weeks or 20 visits   - Pt will demonstrate increased lumbar ROM by at least 6 degrees from initial ROM value, resulting in improved ability to perform functional forward bending  while standing and sitting. Appropriate and Ongoing  - Pt will demonstrate increased MedX average isometric strength value by 30% from initial test resulting in improved ability to perform bending, lifting, and carrying activities safely and confidently. Appropriate and Ongoing  - Pt to demonstrate ability to independently control and reduce their pain through posture positioning and mechanical movements throughout a typical day. Appropriate and Ongoing  - Pt will demonstrate reduced pain and improved functional outcomes as reported on the FOTO by reaching a limitation score of < or = 5% or less in order to demonstrate subjective improvement in pt's condition.   Appropriate and Ongoing  - Pt will demonstrate independence with the HEP at discharge. Appropriate and Ongoing  - Pt will be able to turn over in bed without increase in low back pain (patient goal) Appropriate and Ongoing  - Pt will be able to transfer from sit to stand without increase in pain (patient goal) Appropriate and Ongoing       Plan   Outpatient physical therapy 2x week for 10 weeks or 20 visits to include the following:   - Patient education  - Therapeutic exercise  - Manual therapy  - Performance testing   - Neuromuscular Re-education  - Therapeutic activity   - Modalities     Pt may be seen by PTA as part of the rehabilitation team.     Therapist: Gagan Oliver, PT  6/28/2023

## 2023-07-03 ENCOUNTER — CLINICAL SUPPORT (OUTPATIENT)
Dept: REHABILITATION | Facility: OTHER | Age: 48
End: 2023-07-03
Payer: COMMERCIAL

## 2023-07-03 DIAGNOSIS — Z78.9 ALTERATION IN MOBILITY ASSOCIATED WITH PAIN: ICD-10-CM

## 2023-07-03 DIAGNOSIS — R52 ALTERATION IN MOBILITY ASSOCIATED WITH PAIN: ICD-10-CM

## 2023-07-03 DIAGNOSIS — R29.898 DECREASED STRENGTH OF TRUNK AND BACK: Primary | ICD-10-CM

## 2023-07-03 PROCEDURE — 97112 NEUROMUSCULAR REEDUCATION: CPT

## 2023-07-03 PROCEDURE — 97110 THERAPEUTIC EXERCISES: CPT

## 2023-07-03 NOTE — PROGRESS NOTES
[facility]  Physical Therapy Treatment Note     Name: Radha Tabor  Clinic Number: [id number]    Therapy Diagnosis:   Encounter Diagnoses   Name Primary?    Decreased strength of trunk and back Yes    Alteration in mobility associated with pain      Physician: Mariya Cheema NP    Visit Date: 7/3/2023    Physician Orders: PT Eval and Treat  Medical Diagnosis from Referral:   M54.9 (ICD-10-CM) - Dorsalgia, unspecified    M47.817 (ICD-10-CM) - Spondylosis without myelopathy or radiculopathy, lumbosacral region    M79.18 (ICD-10-CM) - Myofascial pain  Evaluation Date: 2023  Authorization Period Expiration: 2023  Plan of Care Expiration: 2023  Reassessment Due: 2023  Visit # / Visits authorized:      Time In: 10:05  Time Out: 11:09  Total Billable Time: 60 minutes  INSURANCE and OUTCOMES: Value Based Insurance with FOTO Outcomes 1/3     Precautions: standard     Pattern of pain determined: 1 PEP    Subjective     Radha reports she is feeling stiff       Patient reports tolerating previous visit without adverse effects.   Patient reports their pain to be 4/10 on a 0-10 scale with 0 being no pain and 10 being the worst pain imaginable.  Pain Location: [facility]     Occupation:  Does administrative desk work  Leisure:  Likes [employer] and [employer]    Pts goals: get stronger  Objective    Baseline Isometric Testing on Med X equipment: Testing administered by PT     Date of testin2023  ROM 0-54 deg   Max Peak Torque 85    Min Peak Torque 39   Flex/Ext Ratio 2.17   % below normative data 54      Limitation/Restriction for FOTO Lumbar Survey     Therapist reviewed FOTO scores for Radha Tabor on 2023.   FOTO documents entered into [employer] - see Media section.     Visit 1 Score: 14%  Visit 5 Score:   Visit 10 Score:   Discharge Score:   Goal Score: 5%        Treatment     Radha received the treatments listed below:      Radha received  "neuromuscular education for 10 minutes via participation on the [employer] Machine. Therapist assisted patient in isolating and engaging spinal stabilization musculature in order to improve functional ability and postural control. Patient performed exercise with therapist guidance in order to accurately use pacer function, avoid valsalva, and optimally exert effort within a safe and effective range via the Fer Exertion Rating Scale. Patient instructed to perform at a midrange of exertion and to complete 15-20 repetitions within appropriate split time, with proper technique, and while maintaining safety.     [facility] 7/3/2023   Visit Number 5   VAS Pain Rating -   Treadmill Time (in min.) 5   Time -   Lumbar Stretches - Slouch Overcorrection -   Extension in Lying 20   Extension in Standing -   Lumbar Extension Seat Pad -   Femur Restraint -   Top Dead Center -   Counterweight -   Lumbar Flexion -   Lumbar Extension -   Lumbar Peak Torque -   Min Torque -   Test Percent Below Normative Data -   Lumbar Weight 44   Repetitions 20   Rating of Perceived Exertion 2   Ice - Z Lie (in min.) 5            Radha participated in neuromuscular re-education activities to improve balance, coordination, proprioception, motor control and/or posture for  minutes. The following activities were included:        Radha participated in therapeutic exercises to develop  for 20 minutes including:    Exercises in bold completed:    Treadmill 5'    Prone on elbows 1 min  Extension in Lying 2x10   Prone hip extension 2x10  Extension in standing x10  Posterior pelvic tilt ,x10,3"   Posterior pelvic tilt + march x10  Glut bridge 5", 2x10  +TrA isometric with ball x10  +TrA isometric with BKFO RTB x10  Supine hip flexor stretch 1 min with strap    Peripheral muscle strengthening which included one set of 15-20 repetitions at a slow and controlled 10-13 second per rep pace focused on strengthening supporting musculature in order to " improve body mechanics and functional mobility. Patient and therapist focused on proper form during treatment to ensure optimal strengthening of each targeted muscle group.  Machines utilized include torso rotation, leg extension, leg curl, chest press, upright row. Tricep extension, bicep curl, leg press, and hip abduction added visit 3    Not performed 7/3/2023 :    Supine hip flexor stretch 2x1 min (hug R knee in when stretching LLE)    TrA activation w/ball + SLR x10   Did not complete due to time constraints, Will Resume next visit        Radha participated in dynamic functional therapeutic activities to improve functional performance and simulate household and community activities for 10 minutes. The following activities were included:    Postural training with the use of a towel at the ischial tub and lumbar roll for       Benefits of slow controled movement and extensions pause on Lumbar medx for strength and endurance  Benefits of completing exercises at home for the most benefit in the program    Not performed 7/3/2023 :    Education on TrA contraction with functional activities and A&P   Education on purpose of postural strengthening   Lumbar roll fitting   DOMS        Radha received manual therapy techniques for   minutes. The following activities were included:        Pt given cold pack for 5 minutes to LB in Z-lie    Patient Education and Home Exercises     Home exercises include:  Prone on elbows  Extension in Lying  Posterior pelvic tilt   Posterior pelvic tilt + march  Extension in standing   Cardio program (V5): -  Lifting education (V11): -  Posture/Lumbar roll: Has no yet obtained  [employer] Discharge handout (date given): -  Equipment at home/gym membership:     Education provided:   - PT role and POC  - HEP      Written Home Exercises Provided: Patient instructed to cont prior HEP.  Exercises were reviewed and Radha was able to demonstrate them prior to the end of the session.  Radha  demonstrated  understanding of the education provided.     See EMR under  for exercises provided     Assessment     Pt presents to visit with some complaints of stiffness in the low back after a very active weekend. Resumed stretches and added supine stability exercises TA isometric and BKFO with concentration on stability on the lumbar spine with focus on awareness of isolating the muscles and breathing. Maintained resistance on the lumbar medx and she was able to complete 20 repetitions at 2/10 RPE, plan to progress as tolerated. Pt reports feeling stronger and like she has more control when on the lumbar medx        Patient is making good progress towards established goals.  Pt will continue to benefit from skilled outpatient physical therapy to address the deficits stated in the impairment chart, provide pt/family education and to maximize pt's level of independence in the home and community environment.     Pt prognosis is Excellent    Anticipated Barriers for therapy: None  Pt's spiritual, cultural and educational needs considered and pt agreeable to plan of care and goals as stated below:     Goals:    Pt is in agreement with the following goals.     Short term goals:  6 weeks or 10 visits   - Pt will demonstrate increased lumbar ROM by at least 3 degrees from the initial ROM value with improvements noted in functional ROM and ability to perform ADLs. Appropriate and Ongoing  - Pt will demonstrate increased MedX average isometric strength value by 15% from initial test resulting in improved ability to perform bending, lifting, and carrying activities safely, confidently. Appropriate and Ongoing  - Pt will report a reduction in worst pain score by 1-2 points for improved tolerance for transitional movements. Appropriate and Ongoing  - Pt able to perform HEP correctly with minimal cueing or supervision from therapist to encourage independent management of symptoms. Appropriate and Ongoing     Long term goals:  10 weeks or 20 visits   - Pt will demonstrate increased lumbar ROM by at least 6 degrees from initial ROM value, resulting in improved ability to perform functional forward bending while standing and sitting. Appropriate and Ongoing  - Pt will demonstrate increased MedX average isometric strength value by 30% from initial test resulting in improved ability to perform bending, lifting, and carrying activities safely and confidently. Appropriate and Ongoing  - Pt to demonstrate ability to independently control and reduce their pain through posture positioning and mechanical movements throughout a typical day. Appropriate and Ongoing  - Pt will demonstrate reduced pain and improved functional outcomes as reported on the FOTO by reaching a limitation score of < or = 5% or less in order to demonstrate subjective improvement in pt's condition.   Appropriate and Ongoing  - Pt will demonstrate independence with the HEP at discharge. Appropriate and Ongoing  - Pt will be able to turn over in bed without increase in low back pain (patient goal) Appropriate and Ongoing  - Pt will be able to transfer from sit to stand without increase in pain (patient goal) Appropriate and Ongoing       Plan   Outpatient physical therapy 2x week for 10 weeks or 20 visits to include the following:   - Patient education  - Therapeutic exercise  - Manual therapy  - Performance testing   - Neuromuscular Re-education  - Therapeutic activity   - Modalities     Pt may be seen by PTA as part of the rehabilitation team.     Therapist: Gagan Oliver, PT  7/3/2023

## 2023-07-05 ENCOUNTER — CLINICAL SUPPORT (OUTPATIENT)
Dept: REHABILITATION | Facility: OTHER | Age: 48
End: 2023-07-05
Payer: COMMERCIAL

## 2023-07-05 DIAGNOSIS — Z78.9 ALTERATION IN MOBILITY ASSOCIATED WITH PAIN: ICD-10-CM

## 2023-07-05 DIAGNOSIS — R52 ALTERATION IN MOBILITY ASSOCIATED WITH PAIN: ICD-10-CM

## 2023-07-05 DIAGNOSIS — R29.898 DECREASED STRENGTH OF TRUNK AND BACK: Primary | ICD-10-CM

## 2023-07-05 PROCEDURE — 97110 THERAPEUTIC EXERCISES: CPT

## 2023-07-05 PROCEDURE — 97112 NEUROMUSCULAR REEDUCATION: CPT

## 2023-07-07 NOTE — PROGRESS NOTES
[facility]  Physical Therapy Treatment Note     Name: Radha Tabor  Clinic Number: [id number]    Therapy Diagnosis:   Encounter Diagnoses   Name Primary?    Decreased strength of trunk and back Yes    Alteration in mobility associated with pain      Physician: Mariya Cheema NP    Visit Date: 2023    Physician Orders: PT Eval and Treat  Medical Diagnosis from Referral:   M54.9 (ICD-10-CM) - Dorsalgia, unspecified    M47.817 (ICD-10-CM) - Spondylosis without myelopathy or radiculopathy, lumbosacral region    M79.18 (ICD-10-CM) - Myofascial pain  Evaluation Date: 2023  Authorization Period Expiration: 2023  Plan of Care Expiration: 2023  Reassessment Due: 2023  Visit # / Visits authorized:      Time In: 1100  Time Out: 1200  Total Billable Time: 55 minutes  INSURANCE and OUTCOMES: Value Based Insurance with FOTO Outcomes 1/3     Precautions: standard     Pattern of pain determined: 1 PEP    Subjective     Radha reports she is doing well today. She thinks that the program is helping.     Patient reports tolerating previous visit without adverse effects.   Patient reports their pain to be 4/10 on a 0-10 scale with 0 being no pain and 10 being the worst pain imaginable.  Pain Location: [facility]     Occupation:  Does administrative desk work  Leisure:  Likes [employer] and [employer] movies    Pts goals: get stronger  Objective    Baseline Isometric Testing on Med X equipment: Testing administered by PT     Date of testin2023  ROM 0-54 deg   Max Peak Torque 85    Min Peak Torque 39   Flex/Ext Ratio 2.17   % below normative data 54      Limitation/Restriction for FOTO Lumbar Survey     Therapist reviewed FOTO scores for Radha Tabor on 2023.   FOTO documents entered into [employer] - see Media section.     Visit 1 Score: 14%  Visit 5 Score: 5%  Visit 10 Score:   Discharge Score:   Goal Score: 5%        Treatment     Radha received the  "treatments listed below:      Radha received neuromuscular education for 10 minutes via participation on the [employer] Machine. Therapist assisted patient in isolating and engaging spinal stabilization musculature in order to improve functional ability and postural control. Patient performed exercise with therapist guidance in order to accurately use pacer function, avoid valsalva, and optimally exert effort within a safe and effective range via the Fer Exertion Rating Scale. Patient instructed to perform at a midrange of exertion and to complete 15-20 repetitions within appropriate split time, with proper technique, and while maintaining safety.     [facility] 7/5/2023   Visit Number 6   VAS Pain Rating 0   Treadmill Time (in min.) 5   Time -   Lumbar Stretches - Slouch Overcorrection -   Extension in Lying -   Extension in Standing -   Lumbar Extension Seat Pad -   Femur Restraint -   Top Dead Center -   Counterweight -   Lumbar Flexion -   Lumbar Extension -   Lumbar Peak Torque -   Min Torque -   Test Percent Below Normative Data -   Lumbar Weight 50   Repetitions 15   Rating of Perceived Exertion 2   Ice - Z Lie (in min.) 5     Radha participated in therapeutic exercises to develop  for 20 minutes including:    Exercises in bold completed:    Treadmill 5'    Prone on elbows 1 min  Extension in Lying 2x10   Prone hip extension 2x10  Extension in standing x10  Posterior pelvic tilt ,x10,3"   Posterior pelvic tilt + march x10  Glut bridge 5", 2x10  TrA isometric with ball x10  TrA isometric with BKFO RTB x10  Supine hip flexor stretch 1 min with strap    Peripheral muscle strengthening which included one set of 15-20 repetitions at a slow and controlled 10-13 second per rep pace focused on strengthening supporting musculature in order to improve body mechanics and functional mobility. Patient and therapist focused on proper form during treatment to ensure optimal strengthening of each targeted muscle " group.  Machines utilized include torso rotation, leg extension, leg curl, chest press, upright row. Tricep extension, bicep curl, leg press, and hip abduction added visit 3    Not performed 7/5/2023 :    Supine hip flexor stretch 2x1 min (hug R knee in when stretching LLE)    Radha participated in dynamic functional therapeutic activities to improve functional performance and simulate household and community activities for 0 minutes. The following activities were included:    Postural training with the use of a towel at the ischial tub and lumbar roll for       Benefits of slow controled movement and extensions pause on Lumbar medx for strength and endurance  Benefits of completing exercises at home for the most benefit in the program    Not performed 7/5/2023 :    Education on TrA contraction with functional activities and A&P   Education on purpose of postural strengthening   Lumbar roll fitting   DOMS        Radha received manual therapy techniques for   minutes. The following activities were included:        Pt given cold pack for 5 minutes to LB in Z-lie    Patient Education and Home Exercises     Home exercises include:  Prone on elbows  Extension in Lying  Posterior pelvic tilt   Posterior pelvic tilt + march  Extension in standing   Cardio program (V5): -  Lifting education (V11): -  Posture/Lumbar roll: Has no yet obtained  [employer] Discharge handout (date given): -  Equipment at home/gym membership:     Education provided:   - PT role and POC  - HEP      Written Home Exercises Provided: Patient instructed to cont prior HEP.  Exercises were reviewed and Radha was able to demonstrate them prior to the end of the session.  Radha demonstrated  understanding of the education provided.     See EMR under  for exercises provided     Assessment     Radha tolerated treatment well today. Reports that she feels like she is making progress. Continued prior exercises and progressed treatment by continuing to focus on  proper form and muscle activation. Patient able to perform 15 reps at increased resistance on MedX machine with appropriate exertion. Tolerated well. Continues to tolerate peripheral exercises well. Will continue to progress as able.      Patient is making good progress towards established goals.  Pt will continue to benefit from skilled outpatient physical therapy to address the deficits stated in the impairment chart, provide pt/family education and to maximize pt's level of independence in the home and community environment.     Pt prognosis is Excellent    Anticipated Barriers for therapy: None  Pt's spiritual, cultural and educational needs considered and pt agreeable to plan of care and goals as stated below:     Goals:    Pt is in agreement with the following goals.     Short term goals:  6 weeks or 10 visits   - Pt will demonstrate increased lumbar ROM by at least 3 degrees from the initial ROM value with improvements noted in functional ROM and ability to perform ADLs. Appropriate and Ongoing  - Pt will demonstrate increased MedX average isometric strength value by 15% from initial test resulting in improved ability to perform bending, lifting, and carrying activities safely, confidently. Appropriate and Ongoing  - Pt will report a reduction in worst pain score by 1-2 points for improved tolerance for transitional movements. Appropriate and Ongoing  - Pt able to perform HEP correctly with minimal cueing or supervision from therapist to encourage independent management of symptoms. Appropriate and Ongoing     Long term goals: 10 weeks or 20 visits   - Pt will demonstrate increased lumbar ROM by at least 6 degrees from initial ROM value, resulting in improved ability to perform functional forward bending while standing and sitting. Appropriate and Ongoing  - Pt will demonstrate increased MedX average isometric strength value by 30% from initial test resulting in improved ability to perform bending, lifting,  and carrying activities safely and confidently. Appropriate and Ongoing  - Pt to demonstrate ability to independently control and reduce their pain through posture positioning and mechanical movements throughout a typical day. Appropriate and Ongoing  - Pt will demonstrate reduced pain and improved functional outcomes as reported on the FOTO by reaching a limitation score of < or = 5% or less in order to demonstrate subjective improvement in pt's condition.   Appropriate and Ongoing  - Pt will demonstrate independence with the HEP at discharge. Appropriate and Ongoing  - Pt will be able to turn over in bed without increase in low back pain (patient goal) Appropriate and Ongoing  - Pt will be able to transfer from sit to stand without increase in pain (patient goal) Appropriate and Ongoing       Plan   Outpatient physical therapy 2x week for 10 weeks or 20 visits to include the following:   - Patient education  - Therapeutic exercise  - Manual therapy  - Performance testing   - Neuromuscular Re-education  - Therapeutic activity   - Modalities     Pt may be seen by PTA as part of the rehabilitation team.     Therapist: Agusto Parry, PT  7/7/2023

## 2023-07-10 ENCOUNTER — CLINICAL SUPPORT (OUTPATIENT)
Dept: REHABILITATION | Facility: OTHER | Age: 48
End: 2023-07-10
Payer: COMMERCIAL

## 2023-07-10 DIAGNOSIS — R29.898 DECREASED STRENGTH OF TRUNK AND BACK: Primary | ICD-10-CM

## 2023-07-10 DIAGNOSIS — R52 ALTERATION IN MOBILITY ASSOCIATED WITH PAIN: ICD-10-CM

## 2023-07-10 DIAGNOSIS — Z78.9 ALTERATION IN MOBILITY ASSOCIATED WITH PAIN: ICD-10-CM

## 2023-07-10 PROCEDURE — 97110 THERAPEUTIC EXERCISES: CPT

## 2023-07-10 PROCEDURE — 97112 NEUROMUSCULAR REEDUCATION: CPT

## 2023-07-10 NOTE — PROGRESS NOTES
[facility]  Physical Therapy Treatment Note     Name: Radha Tabor  Clinic Number: [id number]    Therapy Diagnosis:   Encounter Diagnoses   Name Primary?    Decreased strength of trunk and back Yes    Alteration in mobility associated with pain      Physician: Mariya Cheema NP    Visit Date: 7/10/2023    Physician Orders: PT Eval and Treat  Medical Diagnosis from Referral:   M54.9 (ICD-10-CM) - Dorsalgia, unspecified    M47.817 (ICD-10-CM) - Spondylosis without myelopathy or radiculopathy, lumbosacral region    M79.18 (ICD-10-CM) - Myofascial pain  Evaluation Date: 2023  Authorization Period Expiration: 2023  Plan of Care Expiration: 2023  Reassessment Due: 2023  Visit # / Visits authorized:      Time In: 13:35  Time Out: 14:30  Total Billable Time: 55 minutes  INSURANCE and OUTCOMES: Value Based Insurance with FOTO Outcomes 1/3     Precautions: standard     Pattern of pain determined: 1 PEP    Subjective     Radha reports she is having some discomfort today from standing and meal prepping yesterday and was standing for about 6 hours.     Patient reports tolerating previous visit without adverse effects.   Patient reports their pain to be 4/10 on a 0-10 scale with 0 being no pain and 10 being the worst pain imaginable.  Pain Location: [facility]     Occupation:  Does administrative desk work  Leisure:  Likes [employer] and [employer] movies    Pts goals: get stronger  Objective    Baseline Isometric Testing on Med X equipment: Testing administered by PT     Date of testin2023  ROM 0-54 deg   Max Peak Torque 85    Min Peak Torque 39   Flex/Ext Ratio 2.17   % below normative data 54      Limitation/Restriction for FOTO Lumbar Survey     Therapist reviewed FOTO scores for Radha Tabor on 2023.   FOTO documents entered into [employer] - see Media section.     Visit 1 Score: 14%  Visit 5 Score: 5%  Visit 10 Score:   Discharge Score:   Goal  "Score: 5%        Treatment     Radha received the treatments listed below:      Radha received neuromuscular education for 10 minutes via participation on the [employer] Machine. Therapist assisted patient in isolating and engaging spinal stabilization musculature in order to improve functional ability and postural control. Patient performed exercise with therapist guidance in order to accurately use pacer function, avoid valsalva, and optimally exert effort within a safe and effective range via the Fer Exertion Rating Scale. Patient instructed to perform at a midrange of exertion and to complete 15-20 repetitions within appropriate split time, with proper technique, and while maintaining safety.     -Cues to smooth pace instead of starting and stopping in order to improve motor control  -Cues to concentrate on extension hold to promote upright standing endurance and posture  -Cues to Focus on pushing with the back instead of the legs      [facility] 7/10/2023   Visit Number 7   VAS Pain Rating 0   Treadmill Time (in min.) 5   Time -   Lumbar Stretches - Slouch Overcorrection -   Extension in Lying 10   Extension in Standing -   Lumbar Extension Seat Pad -   Femur Restraint -   Top Dead Center -   Counterweight -   Lumbar Flexion -   Lumbar Extension -   Lumbar Peak Torque -   Min Torque -   Test Percent Below Normative Data -   Lumbar Weight 50   Repetitions 20   Rating of Perceived Exertion 5.5   Ice - Z Lie (in min.) 5        Radha participated in therapeutic exercises to develop  for 20 minutes including:    Exercises in bold completed:    Treadmill 5'    Cat camel x10  Prone on elbows 1 min  Extension in Lying x10   +Bird dog (not alternating) x10  Prone hip extension 2x10  Extension in standing x10  Posterior pelvic tilt ,x5,3"   Posterior pelvic tilt + march x10  Glut bridge 5", 2x10  LTR with ball for Oblique activation x10  TrA isometric with ball x10  Side lying clams RTB x 10  Supine hip flexor " stretch 1 min with strap    Peripheral muscle strengthening which included one set of 15-20 repetitions at a slow and controlled 10-13 second per rep pace focused on strengthening supporting musculature in order to improve body mechanics and functional mobility. Patient and therapist focused on proper form during treatment to ensure optimal strengthening of each targeted muscle group.  Machines utilized include torso rotation, leg extension, leg curl, chest press, upright row. Tricep extension, bicep curl, leg press, and hip abduction added visit 3    Not performed 7/10/2023 :    Supine hip flexor stretch 2x1 min (hug R knee in when stretching LLE)    Radha participated in dynamic functional therapeutic activities to improve functional performance and simulate household and community activities for 0 minutes. The following activities were included:    Postural training with the use of a towel at the ischial tub and lumbar roll for       Benefits of slow controled movement and extensions pause on Lumbar medx for strength and endurance  Benefits of completing exercises at home for the most benefit in the program    Not performed 7/10/2023 :    Education on TrA contraction with functional activities and A&P   Education on purpose of postural strengthening   Lumbar roll fitting   CHENG Garcia received manual therapy techniques for   minutes. The following activities were included:        Pt given cold pack for 5 minutes to LB in Z-lie    Patient Education and Home Exercises     Home exercises include:  Prone on elbows  Extension in Lying  Posterior pelvic tilt   Posterior pelvic tilt + march  Extension in standing   Cardio program (V5): -  Lifting education (V11): -  Posture/Lumbar roll: Has no yet obtained  [employer] Discharge handout (date given): -  Equipment at home/gym membership:     Education provided:   - PT role and POC  - HEP      Written Home Exercises Provided: Patient instructed to cont prior  HEP.  Exercises were reviewed and Radha was able to demonstrate them prior to the end of the session.  Radha demonstrated  understanding of the education provided.     See EMR under  for exercises provided     Assessment     Radha tolerated treatment well today with no complaints of pain but spent a lot of time yesterday standing cooking. She has not been back to work yet so she has not sat in her chair for work and that is where she would usually get the most pain. Added exercises for strength and endurance holds for stability and she had a significant challenge with stability doing bird dogs with cues and without alternating. Also added LTR with ball for active trunk mobility due to pt reports of complaints when turing over in bed. Maintained resistance on the lumbar medx and she was able to compete 20 repetitions at 5.5/10 RPE. Plant o progress as tolerated. Exercises given for home    Patient is making good progress towards established goals.  Pt will continue to benefit from skilled outpatient physical therapy to address the deficits stated in the impairment chart, provide pt/family education and to maximize pt's level of independence in the home and community environment.     Pt prognosis is Excellent    Anticipated Barriers for therapy: None  Pt's spiritual, cultural and educational needs considered and pt agreeable to plan of care and goals as stated below:     Goals:    Pt is in agreement with the following goals.     Short term goals:  6 weeks or 10 visits   - Pt will demonstrate increased lumbar ROM by at least 3 degrees from the initial ROM value with improvements noted in functional ROM and ability to perform ADLs. Appropriate and Ongoing  - Pt will demonstrate increased MedX average isometric strength value by 15% from initial test resulting in improved ability to perform bending, lifting, and carrying activities safely, confidently. Appropriate and Ongoing  - Pt will report a reduction in worst pain  score by 1-2 points for improved tolerance for transitional movements. Appropriate and Ongoing  - Pt able to perform HEP correctly with minimal cueing or supervision from therapist to encourage independent management of symptoms. Appropriate and Ongoing     Long term goals: 10 weeks or 20 visits   - Pt will demonstrate increased lumbar ROM by at least 6 degrees from initial ROM value, resulting in improved ability to perform functional forward bending while standing and sitting. Appropriate and Ongoing  - Pt will demonstrate increased MedX average isometric strength value by 30% from initial test resulting in improved ability to perform bending, lifting, and carrying activities safely and confidently. Appropriate and Ongoing  - Pt to demonstrate ability to independently control and reduce their pain through posture positioning and mechanical movements throughout a typical day. Appropriate and Ongoing  - Pt will demonstrate reduced pain and improved functional outcomes as reported on the FOTO by reaching a limitation score of < or = 5% or less in order to demonstrate subjective improvement in pt's condition.   Appropriate and Ongoing  - Pt will demonstrate independence with the HEP at discharge. Appropriate and Ongoing  - Pt will be able to turn over in bed without increase in low back pain (patient goal) Appropriate and Ongoing  - Pt will be able to transfer from sit to stand without increase in pain (patient goal) Appropriate and Ongoing       Plan   Outpatient physical therapy 2x week for 10 weeks or 20 visits to include the following:   - Patient education  - Therapeutic exercise  - Manual therapy  - Performance testing   - Neuromuscular Re-education  - Therapeutic activity   - Modalities     Pt may be seen by PTA as part of the rehabilitation team.     Therapist: Gagan Oliver, PT  7/10/2023

## 2023-07-12 ENCOUNTER — CLINICAL SUPPORT (OUTPATIENT)
Dept: REHABILITATION | Facility: OTHER | Age: 48
End: 2023-07-12
Payer: COMMERCIAL

## 2023-07-12 DIAGNOSIS — R52 ALTERATION IN MOBILITY ASSOCIATED WITH PAIN: ICD-10-CM

## 2023-07-12 DIAGNOSIS — R29.898 DECREASED STRENGTH OF TRUNK AND BACK: Primary | ICD-10-CM

## 2023-07-12 DIAGNOSIS — Z78.9 ALTERATION IN MOBILITY ASSOCIATED WITH PAIN: ICD-10-CM

## 2023-07-12 PROCEDURE — 97112 NEUROMUSCULAR REEDUCATION: CPT

## 2023-07-12 PROCEDURE — 97110 THERAPEUTIC EXERCISES: CPT

## 2023-07-12 NOTE — PROGRESS NOTES
[facility]  Physical Therapy Treatment Note     Name: Radha Tabor  Clinic Number: [id number]    Therapy Diagnosis:   Encounter Diagnoses   Name Primary?    Decreased strength of trunk and back Yes    Alteration in mobility associated with pain        Physician: Mariya Cheema NP    Visit Date: 2023    Physician Orders: PT Eval and Treat  Medical Diagnosis from Referral:   M54.9 (ICD-10-CM) - Dorsalgia, unspecified    M47.817 (ICD-10-CM) - Spondylosis without myelopathy or radiculopathy, lumbosacral region    M79.18 (ICD-10-CM) - Myofascial pain  Evaluation Date: 2023  Authorization Period Expiration: 2023  Plan of Care Expiration: 2023  Reassessment Due: 2023  Visit # / Visits authorized:      Time In: 12:43  Time Out: 1:25  Total Billable Time: 42 minutes  INSURANCE and OUTCOMES: Value Based Insurance with FOTO Outcomes 1/3     Precautions: standard     Pattern of pain determined: 1 PEP    Subjective     Radha reports she notices stiffness in the morning and she feels better after stretching    Patient reports tolerating previous visit without adverse effects.   Patient reports their pain to be 0/10 on a 0-10 scale with 0 being no pain and 10 being the worst pain imaginable.  Pain Location: [facility]     Occupation:  Does administrative desk work  Leisure:  Likes [employer] and [employer]    Pts goals: get stronger  Objective    Baseline Isometric Testing on Med X equipment: Testing administered by PT     Date of testin2023  ROM 0-54 deg   Max Peak Torque 85    Min Peak Torque 39   Flex/Ext Ratio 2.17   % below normative data 54      Limitation/Restriction for FOTO Lumbar Survey     Therapist reviewed FOTO scores for Radha Tabor on 2023.   FOTO documents entered into [employer] - see Media section.     Visit 1 Score: 14%  Visit 5 Score: 5%  Visit 10 Score:   Discharge Score:   Goal Score: 5%        Treatment     Radha  "received the treatments listed below:      Radha participated in neuromuscular re-education activities to improve balance, coordination, proprioception, motor control and/or posture for 10 minutes. The following activities were included:    -Cues to smooth pace instead of starting and stopping in order to improve motor control  -Cues to concentrate on extension hold to promote upright standing endurance and posture  -Cues to Focus on pushing with the back instead of the legs  -Cues when performing bird dog to brace at the abdominal muscles and keep the head in neutral.       Radha participated in therapeutic exercises to develop  for 30 minutes including:    Exercises in bold completed:    Treadmill 5'    Cat camel 2x10  Extension in Lying x10   Posterior pelvic tilt + kick x10  Glut bridge 5", 2x10  Bird dog (not alternating) x10    [facility] 7/12/2023   Visit Number 8   VAS Pain Rating 0   Treadmill Time (in min.) -   Time -   Lumbar Stretches - Slouch Overcorrection -   Extension in Lying 10   Extension in Standing -   Lumbar Extension Seat Pad -   Test Percent Below Normative Data -   Lumbar Weight 52   Repetitions 15   Rating of Perceived Exertion 4   Ice - Z Lie (in min.) 5         Not performed 7/12/2023 :  Prone on elbows 1 min  Prone hip extension 2x10  Extension in standing x10  Posterior pelvic tilt ,x5,3"   LTR with ball for Oblique activation x10  TrA isometric with ball x10  Side lying clams RTB x 10  Supine hip flexor stretch 1 min with strap      Completed Peripheral muscle strengthening which included one set of 15-20 repetitions at a slow and controlled 10-13 second per rep pace focused on strengthening supporting musculature in order to improve body mechanics and functional mobility. Patient and therapist focused on proper form during treatment to ensure optimal strengthening of each targeted muscle group.  Machines utilized include torso rotation, leg extension, leg curl, chest press, " upright row. Tricep extension, bicep curl, leg press, and hip abduction added visit 3    Not performed 7/12/2023 :    Supine hip flexor stretch 2x1 min (hug R knee in when stretching LLE)    Radha participated in dynamic functional therapeutic activities to improve functional performance and simulate household and community activities for 0 minutes. The following activities were included:    Postural training with the use of a towel at the ischial tub and lumbar roll for       Benefits of slow controled movement and extensions pause on Lumbar medx for strength and endurance  Benefits of completing exercises at home for the most benefit in the program    Not performed 7/12/2023 :    Education on TrA contraction with functional activities and A&P   Education on purpose of postural strengthening   Lumbar roll fitting   DOMS        Radha received manual therapy techniques for   minutes. The following activities were included:        Pt given cold pack for 5 minutes to LB in Z-lie    Patient Education and Home Exercises     Home exercises include:  Prone on elbows  Extension in Lying  Posterior pelvic tilt   Posterior pelvic tilt + march  Extension in standing   Cardio program (V5): -  Lifting education (V11): -  Posture/Lumbar roll: Has no yet obtained  [employer] Discharge handout (date given): -  Equipment at home/gym membership:     Education provided:   - PT role and POC  - HEP      Written Home Exercises Provided: Patient instructed to cont prior HEP.  Exercises were reviewed and Radha was able to demonstrate them prior to the end of the session.  Radha demonstrated  understanding of the education provided.     See EMR under  for exercises provided     Assessment     Radha tolerated treatment well today with no complaints of pain but reports stiffness first thing in the morning. Continued with stabilization and strengthening at the lumbar spine with static holds. She did not has much instability when completing bird  dogs but will continue to work on it in clinic. Increased resistance on the lumbar medx and she was able to compete 15 repetitions at 4/10 RPE. Plan to progress as tolerated next visit.     Patient is making good progress towards established goals.  Pt will continue to benefit from skilled outpatient physical therapy to address the deficits stated in the impairment chart, provide pt/family education and to maximize pt's level of independence in the home and community environment.     Pt prognosis is Excellent    Anticipated Barriers for therapy: None  Pt's spiritual, cultural and educational needs considered and pt agreeable to plan of care and goals as stated below:     Goals:    Pt is in agreement with the following goals.     Short term goals:  6 weeks or 10 visits   - Pt will demonstrate increased lumbar ROM by at least 3 degrees from the initial ROM value with improvements noted in functional ROM and ability to perform ADLs. Appropriate and Ongoing  - Pt will demonstrate increased MedX average isometric strength value by 15% from initial test resulting in improved ability to perform bending, lifting, and carrying activities safely, confidently. Appropriate and Ongoing  - Pt will report a reduction in worst pain score by 1-2 points for improved tolerance for transitional movements. Appropriate and Ongoing  - Pt able to perform HEP correctly with minimal cueing or supervision from therapist to encourage independent management of symptoms. Appropriate and Ongoing     Long term goals: 10 weeks or 20 visits   - Pt will demonstrate increased lumbar ROM by at least 6 degrees from initial ROM value, resulting in improved ability to perform functional forward bending while standing and sitting. Appropriate and Ongoing  - Pt will demonstrate increased MedX average isometric strength value by 30% from initial test resulting in improved ability to perform bending, lifting, and carrying activities safely and confidently.  Appropriate and Ongoing  - Pt to demonstrate ability to independently control and reduce their pain through posture positioning and mechanical movements throughout a typical day. Appropriate and Ongoing  - Pt will demonstrate reduced pain and improved functional outcomes as reported on the FOTO by reaching a limitation score of < or = 5% or less in order to demonstrate subjective improvement in pt's condition.   Appropriate and Ongoing  - Pt will demonstrate independence with the HEP at discharge. Appropriate and Ongoing  - Pt will be able to turn over in bed without increase in low back pain (patient goal) Appropriate and Ongoing  - Pt will be able to transfer from sit to stand without increase in pain (patient goal) Appropriate and Ongoing       Plan   Outpatient physical therapy 2x week for 10 weeks or 20 visits to include the following:   - Patient education  - Therapeutic exercise  - Manual therapy  - Performance testing   - Neuromuscular Re-education  - Therapeutic activity   - Modalities     Pt may be seen by PTA as part of the rehabilitation team.     Therapist: Gagan Oliver, PT  7/12/2023

## 2023-07-24 DIAGNOSIS — J31.0 CHRONIC RHINITIS: ICD-10-CM

## 2023-07-24 DIAGNOSIS — J32.9 CHRONIC SINUSITIS, UNSPECIFIED LOCATION: ICD-10-CM

## 2023-07-24 NOTE — TELEPHONE ENCOUNTER
No care due was identified.  [facility] Embedded Care Due Messages. Reference number: [id number].   7/24/2023 3:35:22 PM CDT

## 2023-07-24 NOTE — TELEPHONE ENCOUNTER
----- Message from Lani Dudley sent at 7/24/2023  3:02 PM CDT -----  Type: RX Refill Request    Who Called: Gillian/ [facility]     Have you contacted your pharmacy:YES     Refill or New Rx:Refill     RX Name and Strength:fluticasone propionate (FLONASE) 50 mcg/actuation nasal spray 16 g     Preferred Pharmacy with phone number:[facility] - [address]   Phone: [phone]  Fax:  [phone]    Local or Mail Order:Mail Order     Would the patient rather a call back or a response via My [facility]? Call     Best Call Back Number: [phone]   REF#: [id number]

## 2023-07-25 RX ORDER — FLUTICASONE PROPIONATE 50 MCG
2 SPRAY, SUSPENSION (ML) NASAL 2 TIMES DAILY
Qty: 16 G | Refills: 11 | Status: SHIPPED | OUTPATIENT
Start: 2023-07-25

## 2023-08-01 ENCOUNTER — CLINICAL SUPPORT (OUTPATIENT)
Dept: REHABILITATION | Facility: OTHER | Age: 48
End: 2023-08-01
Payer: COMMERCIAL

## 2023-08-01 ENCOUNTER — OFFICE VISIT (OUTPATIENT)
Dept: SPINE | Facility: CLINIC | Age: 48
End: 2023-08-01
Payer: COMMERCIAL

## 2023-08-01 VITALS
OXYGEN SATURATION: 100 % | DIASTOLIC BLOOD PRESSURE: 87 MMHG | RESPIRATION RATE: 18 BRPM | WEIGHT: 174.81 LBS | BODY MASS INDEX: 26.58 KG/M2 | SYSTOLIC BLOOD PRESSURE: 122 MMHG | TEMPERATURE: 97 F | HEART RATE: 85 BPM

## 2023-08-01 DIAGNOSIS — R29.898 DECREASED STRENGTH OF TRUNK AND BACK: Primary | ICD-10-CM

## 2023-08-01 DIAGNOSIS — M47.817 SPONDYLOSIS WITHOUT MYELOPATHY OR RADICULOPATHY, LUMBOSACRAL REGION: ICD-10-CM

## 2023-08-01 DIAGNOSIS — M79.18 MYOFASCIAL PAIN: ICD-10-CM

## 2023-08-01 DIAGNOSIS — R52 ALTERATION IN MOBILITY ASSOCIATED WITH PAIN: ICD-10-CM

## 2023-08-01 DIAGNOSIS — Z78.9 ALTERATION IN MOBILITY ASSOCIATED WITH PAIN: ICD-10-CM

## 2023-08-01 DIAGNOSIS — M54.9 DORSALGIA, UNSPECIFIED: Primary | ICD-10-CM

## 2023-08-01 PROCEDURE — 97112 NEUROMUSCULAR REEDUCATION: CPT | Mod: CQ

## 2023-08-01 PROCEDURE — 1159F MED LIST DOCD IN RCRD: CPT | Mod: CPTII,S$GLB,, | Performed by: NURSE PRACTITIONER

## 2023-08-01 PROCEDURE — 1159F PR MEDICATION LIST DOCUMENTED IN MEDICAL RECORD: ICD-10-PCS | Mod: CPTII,S$GLB,, | Performed by: NURSE PRACTITIONER

## 2023-08-01 PROCEDURE — 1160F PR REVIEW ALL MEDS BY PRESCRIBER/CLIN PHARMACIST DOCUMENTED: ICD-10-PCS | Mod: CPTII,S$GLB,, | Performed by: NURSE PRACTITIONER

## 2023-08-01 PROCEDURE — 3008F BODY MASS INDEX DOCD: CPT | Mod: CPTII,S$GLB,, | Performed by: NURSE PRACTITIONER

## 2023-08-01 PROCEDURE — 1160F RVW MEDS BY RX/DR IN RCRD: CPT | Mod: CPTII,S$GLB,, | Performed by: NURSE PRACTITIONER

## 2023-08-01 PROCEDURE — 3008F PR BODY MASS INDEX (BMI) DOCUMENTED: ICD-10-PCS | Mod: CPTII,S$GLB,, | Performed by: NURSE PRACTITIONER

## 2023-08-01 PROCEDURE — 3074F PR MOST RECENT SYSTOLIC BLOOD PRESSURE < 130 MM HG: ICD-10-PCS | Mod: CPTII,S$GLB,, | Performed by: NURSE PRACTITIONER

## 2023-08-01 PROCEDURE — 3079F DIAST BP 80-89 MM HG: CPT | Mod: CPTII,S$GLB,, | Performed by: NURSE PRACTITIONER

## 2023-08-01 PROCEDURE — 99214 OFFICE O/P EST MOD 30 MIN: CPT | Mod: S$GLB,,, | Performed by: NURSE PRACTITIONER

## 2023-08-01 PROCEDURE — 97110 THERAPEUTIC EXERCISES: CPT | Mod: CQ

## 2023-08-01 PROCEDURE — 99999 PR PBB SHADOW E&M-EST. PATIENT-LVL IV: ICD-10-PCS | Mod: PBBFAC,,, | Performed by: NURSE PRACTITIONER

## 2023-08-01 PROCEDURE — 3079F PR MOST RECENT DIASTOLIC BLOOD PRESSURE 80-89 MM HG: ICD-10-PCS | Mod: CPTII,S$GLB,, | Performed by: NURSE PRACTITIONER

## 2023-08-01 PROCEDURE — 99214 PR OFFICE/OUTPT VISIT, EST, LEVL IV, 30-39 MIN: ICD-10-PCS | Mod: S$GLB,,, | Performed by: NURSE PRACTITIONER

## 2023-08-01 PROCEDURE — 3074F SYST BP LT 130 MM HG: CPT | Mod: CPTII,S$GLB,, | Performed by: NURSE PRACTITIONER

## 2023-08-01 PROCEDURE — 99999 PR PBB SHADOW E&M-EST. PATIENT-LVL IV: CPT | Mod: PBBFAC,,, | Performed by: NURSE PRACTITIONER

## 2023-08-01 NOTE — PROGRESS NOTES
Subjective:     Patient ID: Radha Tabor is a 48 y.o. female.    Chief Complaint: No chief complaint on file.    Interval History 8/2/2023:  Ms. Tabor presents today for follow-up of low back pain.  She is currently doing [facility] Back program with improvement in her symptoms.  She did have a minor setback with increased pain while attending a conference, but she was doing increased activities including walking and standing.  She has since returned to baseline.    HPI Ms. Tabor is a 48 year old female here for evaluation of low back pain    C/o bandlike low back pain for the past 4 years  Denies leg pain or numbness/tingling  Worse with turning in bed and sit to stand  No PT or injections in the past  Not interested in medications    Lumbar xray 2021    FINDINGS:  No significant alignment abnormality.  Vertebral body heights are normally maintained, without compression deformity at any level.  No definite spondylitic defects.  No disc narrowing.  Vertebral endplates are well defined.  No osseous destructive process.  SI joints appear unremarkable.     Impression:     No significant abnormality identified.    Past Medical History:   Diagnosis Date    Acute hepatitis C without mention of hepatic coma(070.51)     Endometriosis, site unspecified     Viral hepatitis C 6/18/2016    Viral hepatitis C 6/18/2016       Past Surgical History:   Procedure Laterality Date    Dx scope  10/16/2012    TDP hysteroscope    HYSTEROSCOPY      endometriosis    LASER LAPAROSCOPY      NASAL SEPTUM SURGERY         Family History   Problem Relation Age of Onset    Hypertension Mother     Heart disease Mother         murmur    Hypertension Father     Heart disease Paternal Grandmother     Breast cancer Neg Hx     Colon cancer Neg Hx     Ovarian cancer Neg Hx        Social History     Socioeconomic History    Marital status:    Tobacco Use    Smoking status: Never    Smokeless tobacco: Never   Substance and Sexual Activity    Alcohol  use: No     Alcohol/week: 0.0 standard drinks of alcohol    Drug use: No    Sexual activity: Yes     Partners: Male     Birth control/protection: None   Social History Narrative     at [employer],  (HTN), 2 step kids, 22, 21.     Social Determinants of Health     Financial Resource Strain: Low Risk  (4/11/2023)    Overall Financial Resource Strain (CARDIA)     Difficulty of Paying Living Expenses: Not hard at all   Food Insecurity: No Food Insecurity (4/11/2023)    Hunger Vital Sign     Worried About Running Out of Food in the Last Year: Never true     Ran Out of Food in the Last Year: Never true   Transportation Needs: No Transportation Needs (4/11/2023)    PRAPARE - Transportation     Lack of Transportation (Medical): No     Lack of Transportation (Non-Medical): No   Physical Activity: Inactive (4/11/2023)    Exercise Vital Sign     Days of Exercise per Week: 0 days     Minutes of Exercise per Session: 30 min   Stress: No Stress Concern Present (4/11/2023)    [place] [employer] - Occupational Stress Questionnaire     Feeling of Stress : Only a little   Social Connections: Unknown (4/11/2023)    Social Connection and Isolation Panel [NHANES]     Frequency of Communication with Friends and Family: More than three times a week     Frequency of Social Gatherings with Friends and Family: Once a week     Active Member of Clubs or Organizations: Yes     Attends Club or Organization Meetings: More than 4 times per year     Marital Status:    Housing Stability: Low Risk  (4/11/2023)    Housing Stability Vital Sign     Unable to Pay for Housing in the Last Year: No     Number of Places Lived in the Last Year: 2     Unstable Housing in the Last Year: No       Current Outpatient Medications   Medication Sig Dispense Refill    azelastine (ASTELIN) 137 mcg (0.1 %) nasal spray 2 sprays (274 mcg total) by Nasal route 2 (two) times daily. 30 mL 11    fexofenadine (ALLEGRA) 180 MG  tablet Take 1 tablet (180 mg total) by mouth once daily. 90 tablet 3    fluticasone propionate (FLONASE) 50 mcg/actuation nasal spray 2 sprays (100 mcg total) by Each Nostril route 2 (two) times a day. 16 g 11    ipratropium (ATROVENT) 21 mcg (0.03 %) nasal spray 2 sprays by Each Nostril route 2 (two) times daily as needed for Rhinitis. 30 mL 11    mupirocin (BACTROBAN) 2 % ointment Apply topically 3 (three) times daily. 15 g 0    olopatadine (PATANOL) 0.1 % ophthalmic solution Place 1 drop into both eyes 2 (two) times daily as needed for Allergies (itchy watery eyes). 5 mL 11    meloxicam (MOBIC) 15 MG tablet TAKE 1 TABLET(15 MG) BY MOUTH EVERY DAY (Patient not taking: Reported on 4/11/2023) 90 tablet 0    valACYclovir (VALTREX) 1000 MG tablet Take 1 tablet (1,000 mg total) by mouth every 12 (twelve) hours. for 10 days 20 tablet 6     No current facility-administered medications for this visit.       Review of patient's allergies indicates:   Allergen Reactions    Hay fever and allergy relief Hives and Itching         Review of Systems   Constitutional: Negative for fever.   Cardiovascular:  Negative for chest pain.   Respiratory:  Negative for shortness of breath.    Musculoskeletal:  Positive for back pain. Negative for falls.   Gastrointestinal:  Negative for abdominal pain, bowel incontinence, nausea and vomiting.   Genitourinary:  Negative for bladder incontinence.   Neurological:  Negative for numbness and paresthesias.        Objective:     General: Radha is well-developed, well-nourished, appears stated age, in no acute distress, alert and oriented to time, place and person.     General    Vitals reviewed.  Constitutional: She is oriented to person, place, and time. She appears well-developed and well-nourished.   HENT:   Head: Atraumatic.   Nose: Nose normal.   Eyes: Conjunctivae are normal.   Cardiovascular:  Normal rate.            Pulmonary/Chest: Effort normal.   Neurological: She is alert and oriented  to person, place, and time.   Psychiatric: She has a normal mood and affect. Her behavior is normal. Judgment and thought content normal.     General Musculoskeletal Exam   Gait: normal     Back (L-Spine & T-Spine) / Neck (C-Spine) Exam     Tenderness Left paramedian tenderness of the Sacrum.     Back (L-Spine & T-Spine) Range of Motion   Extension:  20   Flexion:  90   Lateral bend right:  10   Lateral bend left:  10     Spinal Sensation   Right Side Sensation  L-Spine Level: normal  S-Spine Level: normal  Left Side Sensation  L-Spine Level: normal  S-Spine Level: normal    Other   She has no scoliosis .  Spinal Kyphosis:  Absent      Muscle Strength   Right Upper Extremity   Biceps: 5/5   Deltoid:  5/5  Triceps:  5/5  Left Upper Extremity  Biceps: 5/5   Deltoid:  5/5  Triceps:  5/5  Right Lower Extremity   Hip Flexion: 5/5   Quadriceps:  5/5   Ankle Dorsiflexion:  5/5   Anterior tibial:  5/5   EHL:  5/5  Left Lower Extremity   Hip Flexion: 5/5   Quadriceps:  5/5   Ankle Dorsiflexion:  5/5   Anterior tibial:  5/5   EHL:  5/5    Reflexes     Left Side  Biceps:  2+  Brachioradialis:  2+  Achilles:  2+  Left Vanessa's Sign:  Absent  Babinski Sign:  absent    Right Side   Biceps:  2+  Brachioradialis:  2+  Achilles:  2+  Right Vanessa's Sign:  absent  Babinski Sign:  absent    Vascular Exam     Right Pulses        Carotid:                  2+    Left Pulses        Carotid:                  2+          Assessment:     1. Dorsalgia, unspecified    2. Spondylosis without myelopathy or radiculopathy, lumbosacral region    3. Myofascial pain           Plan:        Prior records reviewed today  Continue healthy back and regular home exercise program  We discussed posture sitting and the importance of trying to sit better.    We discussed the benefits of therapy and exercise and continuing to move.   RTC for followup in 3 months    Follow-up: Follow up in about 3 months (around 11/1/2023). If there are any questions prior to  this, the patient was instructed to contact the office.

## 2023-08-01 NOTE — PROGRESS NOTES
[facility]  Physical Therapy Treatment Note     Name: Radha Tabor  Clinic Number: [id number]    Therapy Diagnosis:   Encounter Diagnoses   Name Primary?    Decreased strength of trunk and back Yes    Alteration in mobility associated with pain          Physician: Mariya Cheema NP    Visit Date: 2023    Physician Orders: PT Eval and Treat  Medical Diagnosis from Referral:   M54.9 (ICD-10-CM) - Dorsalgia, unspecified    M47.817 (ICD-10-CM) - Spondylosis without myelopathy or radiculopathy, lumbosacral region    M79.18 (ICD-10-CM) - Myofascial pain  Evaluation Date: 2023  Authorization Period Expiration: 2023  Plan of Care Expiration: 2023  Reassessment Due: 2023  Visit # / Visits authorized:      Time In: 1400  Time Out: 1455  Total Billable Time: 55 minutes  INSURANCE and OUTCOMES: Value Based Insurance with FOTO Outcomes 1/3     Precautions: standard     Pattern of pain determined: 1 PEP    Subjective     Radha presents to  wearing an elastic back supporter c/o increased LBP while out of town attending a conference. She reports increased walking and sitting reason for her increased LBP. Pt states she did perform some of the stretches while out of town.    Patient reports tolerating previous visit without adverse effects.   Patient reports their pain to be 3/10 on a 0-10 scale with 0 being no pain and 10 being the worst pain imaginable.  Pain Location: [facility]     Occupation:  Does administrative desk work  Leisure:  Likes [employer] and [employer] movies    Pts goals: get stronger  Objective    Baseline Isometric Testing on Med X equipment: Testing administered by PT     Date of testin2023  ROM 0-54 deg   Max Peak Torque 85    Min Peak Torque 39   Flex/Ext Ratio 2.17   % below normative data 54      Limitation/Restriction for FOTO Lumbar Survey     Therapist reviewed FOTO scores for Radha Tabor on 2023.   FOTO documents  "entered into [email or web address] - see Media section.     Visit 1 Score: 14%  Visit 5 Score: 5%  Visit 10 Score:   Discharge Score:   Goal Score: 5%        Treatment     Radha received the treatments listed below:      Radha participated in neuromuscular re-education activities to improve balance, coordination, proprioception, motor control and/or posture for 10 minutes. The following activities were included:    [facility] 8/1/2023   Visit Number 9   VAS Pain Rating 3   Treadmill Time (in min.) 5   Time -   Lumbar Stretches - Slouch Overcorrection 10   Extension in Lying 10   Extension in Standing -   Lumbar Extension Seat Pad -   Femur Restraint -   Top Dead Center -   Counterweight -   Lumbar Flexion -   Lumbar Extension -   Lumbar Peak Torque -   Min Torque -   Test Percent Below Normative Data -   Lumbar Weight 52   Repetitions 17   Rating of Perceived Exertion 4   Ice - Z Lie (in min.) 5      -Cues to smooth pace instead of starting and stopping in order to improve motor control  -Cues to concentrate on extension hold to promote upright standing endurance and posture  -Cues to Focus on pushing with the back instead of the legs     Radha participated in therapeutic exercises to develop  for 45 minutes including:    Exercises in bold completed:    Treadmill 5'  LTR with ball for Oblique activation x10  Cat camel 2x10  Extension in Lying x10   Posterior pelvic tilt x10  Glut bridge 5", 2x10  +Dying bug x10  Bird dog (not alternating) x10  +SOC x10      Not performed 8/1/2023 :  Prone on elbows 1 min  Prone hip extension 2x10  Extension in standing x10  TrA isometric with ball x10  Side lying clams RTB x 10  Supine hip flexor stretch 1 min with strap      Completed Peripheral muscle strengthening which included one set of 15-20 repetitions at a slow and controlled 10-13 second per rep pace focused on strengthening supporting musculature in order to improve body mechanics and functional mobility. Patient and therapist " focused on proper form during treatment to ensure optimal strengthening of each targeted muscle group.  Machines utilized include torso rotation, leg extension, leg curl, chest press, upright row. Tricep extension, bicep curl, leg press, and hip abduction added visit 3    Not performed 8/1/2023 :    Supine hip flexor stretch 2x1 min (hug R knee in when stretching LLE)    Radha participated in dynamic functional therapeutic activities to improve functional performance and simulate household and community activities for 5 minutes. The following activities were included:    -Pt education of flare ups. What they are (not a new pain) and how to use new skills for easing discomfort. Return to initial HEP and stretches, slow and controlled pace, keep moving and pace other activities. Reminded pt to use lumbar roll on airplane and meeting chairs.       Benefits of slow controled movement and extensions pause on Lumbar medx for strength and endurance  Benefits of completing exercises at home for the most benefit in the program    Not performed 8/1/2023 :    Education on TrA contraction with functional activities and A&P   Education on purpose of postural strengthening   Lumbar roll fitting   CHENG Garcia received manual therapy techniques for  0 minutes. The following activities were included:    Pt given cold pack for 5 minutes to LB in Z-lie    Patient Education and Home Exercises     Home exercises include:  Prone on elbows  Extension in Lying  Posterior pelvic tilt   Posterior pelvic tilt + march  Extension in standing   Cardio program (V5): -  Lifting education (V11): -  Posture/Lumbar roll: Has no yet obtained  [employer] Discharge handout (date given): -  Equipment at home/gym membership:     Education provided:   - PT role and POC  - HEP      Written Home Exercises Provided: Patient instructed to cont prior HEP.  Exercises were reviewed and Radha was able to demonstrate them prior to the end of the session.  Radha  demonstrated  understanding of the education provided.     See EMR under  for exercises provided     Assessment     Radha returns after 2 wk hiatus due to traveling with c/o increased LBP. Following ed about flare ups, pt returned to initial HEP stretches and then continued with stabilization and strengthening exercises. Progressed core strengthening with addition of dying bugs. Pt tolerated all mat exercises well with no increased in discomfort.  Lumbar medx resistance remained at 52ft/lbs, she was able to compete 17 repetitions at 4/10 RPE. Plan to progress as tolerated next visit.     Patient is making good progress towards established goals.  Pt will continue to benefit from skilled outpatient physical therapy to address the deficits stated in the impairment chart, provide pt/family education and to maximize pt's level of independence in the home and community environment.     Pt prognosis is Excellent    Anticipated Barriers for therapy: None  Pt's spiritual, cultural and educational needs considered and pt agreeable to plan of care and goals as stated below:     Goals:    Pt is in agreement with the following goals.     Short term goals:  6 weeks or 10 visits   - Pt will demonstrate increased lumbar ROM by at least 3 degrees from the initial ROM value with improvements noted in functional ROM and ability to perform ADLs. Appropriate and Ongoing  - Pt will demonstrate increased MedX average isometric strength value by 15% from initial test resulting in improved ability to perform bending, lifting, and carrying activities safely, confidently. Appropriate and Ongoing  - Pt will report a reduction in worst pain score by 1-2 points for improved tolerance for transitional movements. Appropriate and Ongoing  - Pt able to perform HEP correctly with minimal cueing or supervision from therapist to encourage independent management of symptoms. Appropriate and Ongoing     Long term goals: 10 weeks or 20 visits   - Pt will  demonstrate increased lumbar ROM by at least 6 degrees from initial ROM value, resulting in improved ability to perform functional forward bending while standing and sitting. Appropriate and Ongoing  - Pt will demonstrate increased MedX average isometric strength value by 30% from initial test resulting in improved ability to perform bending, lifting, and carrying activities safely and confidently. Appropriate and Ongoing  - Pt to demonstrate ability to independently control and reduce their pain through posture positioning and mechanical movements throughout a typical day. Appropriate and Ongoing  - Pt will demonstrate reduced pain and improved functional outcomes as reported on the FOTO by reaching a limitation score of < or = 5% or less in order to demonstrate subjective improvement in pt's condition.   Appropriate and Ongoing  - Pt will demonstrate independence with the HEP at discharge. Appropriate and Ongoing  - Pt will be able to turn over in bed without increase in low back pain (patient goal) Appropriate and Ongoing  - Pt will be able to transfer from sit to stand without increase in pain (patient goal) Appropriate and Ongoing       Plan   Outpatient physical therapy 2x week for 10 weeks or 20 visits to include the following:   - Patient education  - Therapeutic exercise  - Manual therapy  - Performance testing   - Neuromuscular Re-education  - Therapeutic activity   - Modalities     Pt may be seen by PTA as part of the rehabilitation team.     Therapist: Dinora Christensen PTA  8/1/2023

## 2023-08-17 ENCOUNTER — CLINICAL SUPPORT (OUTPATIENT)
Dept: REHABILITATION | Facility: OTHER | Age: 48
End: 2023-08-17
Payer: COMMERCIAL

## 2023-08-17 DIAGNOSIS — Z78.9 ALTERATION IN MOBILITY ASSOCIATED WITH PAIN: ICD-10-CM

## 2023-08-17 DIAGNOSIS — R52 ALTERATION IN MOBILITY ASSOCIATED WITH PAIN: ICD-10-CM

## 2023-08-17 DIAGNOSIS — R29.898 DECREASED STRENGTH OF TRUNK AND BACK: Primary | ICD-10-CM

## 2023-08-17 PROCEDURE — 97110 THERAPEUTIC EXERCISES: CPT

## 2023-08-17 NOTE — PLAN OF CARE
[facility]  Physical Therapy Treatment Note     Name: Radha BERNAL [facility] Number: [id number]    Therapy Diagnosis:   Encounter Diagnoses   Name Primary?    Decreased strength of trunk and back Yes    Alteration in mobility associated with pain          Physician: Mariya Cheema NP    Visit Date: 2023    Physician Orders: PT Eval and Treat  Medical Diagnosis from Referral:   M54.9 (ICD-10-CM) - Dorsalgia, unspecified    M47.817 (ICD-10-CM) - Spondylosis without myelopathy or radiculopathy, lumbosacral region    M79.18 (ICD-10-CM) - Myofascial pain  Evaluation Date: 2023  Authorization Period Expiration: 2023  Plan of Care Expiration: 2023  Reassessment Due: 2023  Visit # / Visits authorized: 10/20     Time In: 3:40  Time Out: 4:40  Total Billable Time: 55 minutes  INSURANCE and OUTCOMES: Value Based Insurance with FOTO Outcomes 1/3     Precautions: standard     Pattern of pain determined: 1 PEP    Subjective     Radha reports she is feeling better and doing her exercises + and workout program that has some of the same exercises that we are doing here.    Patient reports tolerating previous visit without adverse effects.   Patient reports their pain to be 0/10 on a 0-10 scale with 0 being no pain and 10 being the worst pain imaginable.  Pain Location: [facility]     Occupation:  Does administrative desk work  Leisure:  Likes [employer] and [employer] movies    Pts goals: get stronger  Objective    Baseline Isometric Testing on Med X equipment: Testing administered by PT     Date of testin2023  ROM 0-54 deg   Max Peak Torque 85    Min Peak Torque 39   Flex/Ext Ratio 2.17   % below normative data 54      Midpoint Isometric Testing on Med X equipment: Testing administered by PT  Date of testin2023  ROM 0-60   Max Peak Torque 91   Min Peak Torque 33   Flex/Ext Ratio 2.77:1   % below normative data -46   % increase in strength 14%         Limitation/Restriction for FOTO Lumbar Survey     Therapist reviewed FOTO scores for Radha Tabor on 5/24/2023.   FOTO documents entered into [employer] - see Media section.     Visit 1 Score: 14%  Visit 5 Score: 5%  Visit 10 Score: 16%  Discharge Score:   Goal Score: 5%        Assessment     Patient has attended 10 visits at [facility] which included MD evaluation, PT evaluation with isometric testing, and physical therapy treatment including HEP instruction, education, aerobic work, dynamic strengthening on med ex equipment for the spine, and whole body strengthening on med ex equipment with increasing weight loads.  Patient is demonstrating increased ability to reduce symptoms, improved posture, increased  ROM, and increased strength on med ex test by 14% average.    Patient is making good progress towards established goals.  Pt will continue to benefit from skilled outpatient physical therapy to address the deficits stated in the impairment chart, provide pt/family education and to maximize pt's level of independence in the home and community environment.     Pt prognosis is Excellent    Anticipated Barriers for therapy: None  Pt's spiritual, cultural and educational needs considered and pt agreeable to plan of care and goals as stated below:     Goals:    Pt is in agreement with the following goals.     Short term goals:  6 weeks or 10 visits   - Pt will demonstrate increased lumbar ROM by at least 3 degrees from the initial ROM value with improvements noted in functional ROM and ability to perform ADLs. MET 8/17  - Pt will demonstrate increased MedX average isometric strength value by 15% from initial test resulting in improved ability to perform bending, lifting, and carrying activities safely, confidently. Partially progressed and Ongoing  - Pt will report a reduction in worst pain score by 1-2 points for improved tolerance for transitional movements. MET 8/17  - Pt able to perform HEP correctly with  minimal cueing or supervision from therapist to encourage independent management of symptoms. MET 8/17     Long term goals: 10 weeks or 20 visits   - Pt will demonstrate increased lumbar ROM by at least 6 degrees from initial ROM value, resulting in improved ability to perform functional forward bending while standing and sitting. MET 8/17  - Pt will demonstrate increased MedX average isometric strength value by 30% from initial test resulting in improved ability to perform bending, lifting, and carrying activities safely and confidently. Appropriate and Ongoing  - Pt to demonstrate ability to independently control and reduce their pain through posture positioning and mechanical movements throughout a typical day. Appropriate and Ongoing  - Pt will demonstrate reduced pain and improved functional outcomes as reported on the FOTO by reaching a limitation score of < or = 5% or less in order to demonstrate subjective improvement in pt's condition.   Appropriate and Ongoing  - Pt will demonstrate independence with the HEP at discharge. Appropriate and Ongoing  - Pt will be able to turn over in bed without increase in low back pain (patient goal) Appropriate and Ongoing  - Pt will be able to transfer from sit to stand without increase in pain (patient goal) Appropriate and Ongoing       Plan   Outpatient physical therapy 2x week for 10 weeks or 20 visits to include the following:   - Patient education  - Therapeutic exercise  - Manual therapy  - Performance testing   - Neuromuscular Re-education  - Therapeutic activity   - Modalities     Pt may be seen by PTA as part of the rehabilitation team.     Therapist: Gagan Oliver, PT  8/17/2023

## 2023-08-17 NOTE — PROGRESS NOTES
[facility]  Physical Therapy Treatment Note     Name: Radha BERNAL [facility] Number: [id number]    Therapy Diagnosis:   Encounter Diagnoses   Name Primary?    Decreased strength of trunk and back Yes    Alteration in mobility associated with pain          Physician: Mariya Cheema NP    Visit Date: 2023    Physician Orders: PT Eval and Treat  Medical Diagnosis from Referral:   M54.9 (ICD-10-CM) - Dorsalgia, unspecified    M47.817 (ICD-10-CM) - Spondylosis without myelopathy or radiculopathy, lumbosacral region    M79.18 (ICD-10-CM) - Myofascial pain  Evaluation Date: 2023  Authorization Period Expiration: 2023  Plan of Care Expiration: 2023  Reassessment Due: 2023  Visit # / Visits authorized: 10/20     Time In: 3:40  Time Out: 4:40  Total Billable Time: 55 minutes  INSURANCE and OUTCOMES: Value Based Insurance with FOTO Outcomes 1/3     Precautions: standard     Pattern of pain determined: 1 PEP    Subjective     Radha reports she is feeling better and doing her exercises + and workout program that has some of the same exercises that we are doing here.    Patient reports tolerating previous visit without adverse effects.   Patient reports their pain to be 0/10 on a 0-10 scale with 0 being no pain and 10 being the worst pain imaginable.  Pain Location: [facility]     Occupation:  Does administrative desk work  Leisure:  Likes [employer] and [employer] movies    Pts goals: get stronger  Objective    Baseline Isometric Testing on Med X equipment: Testing administered by PT     Date of testin2023  ROM 0-54 deg   Max Peak Torque 85    Min Peak Torque 39   Flex/Ext Ratio 2.17   % below normative data 54      Midpoint Isometric Testing on Med X equipment: Testing administered by PT  Date of testin2023  ROM 0-60   Max Peak Torque 91   Min Peak Torque 33   Flex/Ext Ratio 2.77:1   % below normative data -46   % increase in strength 14%     "    Limitation/Restriction for FOTO Lumbar Survey     Therapist reviewed FOTO scores for Radha Tabor on 5/24/2023.   FOTO documents entered into [employer] - see Media section.     Visit 1 Score: 14%  Visit 5 Score: 5%  Visit 10 Score: 16%  Discharge Score:   Goal Score: 5%        Treatment     Radha received the treatments listed below:         Radha participated in therapeutic exercises to develop  for 45 minutes including:    Exercises in bold completed:    Treadmill 5' - NP  Cat camel 2x10  Extension in Lying x10   Glut bridge 5", 2x10 BTB  Dying bug x10  Bird dog (alternating) x10  +Pallof press x15    **Update HEP next visit**    [facility] 8/17/2023   Visit Number 10   VAS Pain Rating 0   Treadmill Time (in min.) -   Time -   Lumbar Stretches - Slouch Overcorrection -   Extension in Lying 20   Extension in Standing -   Lumbar Extension Seat Pad -   Femur Restraint -   Top Dead Center -   Counterweight -   Lumbar Flexion 60   Lumbar Extension 0   Lumbar Peak Torque 91   Min Torque 33   Test Percent Below Normative Data 46   Test Percent Gain in Strength from Initial  14   Lumbar Weight -   Repetitions -   Rating of Perceived Exertion -   Ice - Z Lie (in min.) 5           Not performed 8/17/2023 :  Prone on elbows 1 min  Prone hip extension 2x10  Extension in standing x10  TrA isometric with ball x10  Side lying clams RTB x 10  Supine hip flexor stretch 1 min with strap  LTR with ball for Oblique activation       Completed Peripheral muscle strengthening which included one set of 15-20 repetitions at a slow and controlled 10-13 second per rep pace focused on strengthening supporting musculature in order to improve body mechanics and functional mobility. Patient and therapist focused on proper form during treatment to ensure optimal strengthening of each targeted muscle group.  Machines utilized include torso rotation, leg extension, leg curl, chest press, upright row. Tricep extension, bicep curl, leg press, " and hip abduction added visit 3    Not performed 8/17/2023 :    Supine hip flexor stretch 2x1 min (hug R knee in when stretching LLE)    Radha participated in dynamic functional therapeutic activities to improve functional performance and simulate household and community activities for 5 minutes. The following activities were included:    -Pt education of flare ups. What they are (not a new pain) and how to use new skills for easing discomfort. Return to initial HEP and stretches, slow and controlled pace, keep moving and pace other activities. Reminded pt to use lumbar roll on airplane and meeting chairs.       Benefits of slow controled movement and extensions pause on Lumbar medx for strength and endurance  Benefits of completing exercises at home for the most benefit in the program    Not performed 8/17/2023 :    Education on TrA contraction with functional activities and A&P   Education on purpose of postural strengthening   Lumbar roll fitting   DOMS    Radah received manual therapy techniques for  0 minutes. The following activities were included:    Pt given cold pack for 5 minutes to LB in Z-lie    Patient Education and Home Exercises     Home exercises include:  Prone on elbows  Extension in Lying  Posterior pelvic tilt   Posterior pelvic tilt + march  Extension in standing   Cardio program (V5): -  Lifting education (V11): -  Posture/Lumbar roll: Has no yet obtained  [employer] Discharge handout (date given): -  Equipment at home/gym membership:     Education provided:   - PT role and POC  - HEP      Written Home Exercises Provided: Patient instructed to cont prior HEP.  Exercises were reviewed and Radha was able to demonstrate them prior to the end of the session.  Radha demonstrated  understanding of the education provided.     See EMR under  for exercises provided     Assessment     Patient has attended 10 visits at [facility] which included MD evaluation, PT evaluation with isometric testing,  and physical therapy treatment including HEP instruction, education, aerobic work, dynamic strengthening on med ex equipment for the spine, and whole body strengthening on med ex equipment with increasing weight loads.  Patient  is demonstrating increased ability to reduce symptoms, improved posture, increased  ROM, and increased strength on med ex test by 14%  average.    Patient is making good progress towards established goals.  Pt will continue to benefit from skilled outpatient physical therapy to address the deficits stated in the impairment chart, provide pt/family education and to maximize pt's level of independence in the home and community environment.     Pt prognosis is Excellent    Anticipated Barriers for therapy: None  Pt's spiritual, cultural and educational needs considered and pt agreeable to plan of care and goals as stated below:     Goals:    Pt is in agreement with the following goals.     Short term goals:  6 weeks or 10 visits   - Pt will demonstrate increased lumbar ROM by at least 3 degrees from the initial ROM value with improvements noted in functional ROM and ability to perform ADLs. MET 8/17  - Pt will demonstrate increased MedX average isometric strength value by 15% from initial test resulting in improved ability to perform bending, lifting, and carrying activities safely, confidently. Partially progressed and Ongoing  - Pt will report a reduction in worst pain score by 1-2 points for improved tolerance for transitional movements. MET 8/17  - Pt able to perform HEP correctly with minimal cueing or supervision from therapist to encourage independent management of symptoms. MET 8/17     Long term goals: 10 weeks or 20 visits   - Pt will demonstrate increased lumbar ROM by at least 6 degrees from initial ROM value, resulting in improved ability to perform functional forward bending while standing and sitting. MET 8/17  - Pt will demonstrate increased MedX average isometric strength value  by 30% from initial test resulting in improved ability to perform bending, lifting, and carrying activities safely and confidently. Appropriate and Ongoing  - Pt to demonstrate ability to independently control and reduce their pain through posture positioning and mechanical movements throughout a typical day. Appropriate and Ongoing  - Pt will demonstrate reduced pain and improved functional outcomes as reported on the FOTO by reaching a limitation score of < or = 5% or less in order to demonstrate subjective improvement in pt's condition.   Appropriate and Ongoing  - Pt will demonstrate independence with the HEP at discharge. Appropriate and Ongoing  - Pt will be able to turn over in bed without increase in low back pain (patient goal) Appropriate and Ongoing  - Pt will be able to transfer from sit to stand without increase in pain (patient goal) Appropriate and Ongoing       Plan   Outpatient physical therapy 2x week for 10 weeks or 20 visits to include the following:   - Patient education  - Therapeutic exercise  - Manual therapy  - Performance testing   - Neuromuscular Re-education  - Therapeutic activity   - Modalities     Pt may be seen by PTA as part of the rehabilitation team.     Therapist: Gagan Oliver, PT  8/17/2023

## 2023-08-22 ENCOUNTER — CLINICAL SUPPORT (OUTPATIENT)
Dept: REHABILITATION | Facility: OTHER | Age: 48
End: 2023-08-22
Payer: COMMERCIAL

## 2023-08-22 DIAGNOSIS — R29.898 DECREASED STRENGTH OF TRUNK AND BACK: Primary | ICD-10-CM

## 2023-08-22 DIAGNOSIS — Z78.9 ALTERATION IN MOBILITY ASSOCIATED WITH PAIN: ICD-10-CM

## 2023-08-22 DIAGNOSIS — R52 ALTERATION IN MOBILITY ASSOCIATED WITH PAIN: ICD-10-CM

## 2023-08-22 PROCEDURE — 97110 THERAPEUTIC EXERCISES: CPT

## 2023-08-22 NOTE — PROGRESS NOTES
[facility]  Physical Therapy Treatment Note     Name: Radha BERNAL [facility] Number: [id number]    Therapy Diagnosis:   Encounter Diagnoses   Name Primary?    Decreased strength of trunk and back Yes    Alteration in mobility associated with pain          Physician: Mariya Cheema NP    Visit Date: 2023    Physician Orders: PT Eval and Treat  Medical Diagnosis from Referral:   M54.9 (ICD-10-CM) - Dorsalgia, unspecified    M47.817 (ICD-10-CM) - Spondylosis without myelopathy or radiculopathy, lumbosacral region    M79.18 (ICD-10-CM) - Myofascial pain  Evaluation Date: 2023  Authorization Period Expiration: 2023  Plan of Care Expiration: 2023  Reassessment Due: 2023  Visit # / Visits authorized:      Time In: 10:52  Time Out: 11:45  Total Billable Time: 53 minutes  INSURANCE and OUTCOMES: Value Based Insurance with FOTO Outcomes 1/3     Precautions: standard     Pattern of pain determined: 1 PEP    Subjective     Radha reports the back is feeling good, she is still doing her workouts and has started golfing.    Patient reports tolerating previous visit without adverse effects.   Patient reports their pain to be 0/10 on a 0-10 scale with 0 being no pain and 10 being the worst pain imaginable.  Pain Location: [facility]     Occupation:  Does administrative desk work  Leisure:  Likes [employer] and [employer] movies    Pts goals: get stronger  Objective    Baseline Isometric Testing on Med X equipment: Testing administered by PT     Date of testin2023  ROM 0-54 deg   Max Peak Torque 85    Min Peak Torque 39   Flex/Ext Ratio 2.17   % below normative data 54      Midpoint Isometric Testing on Med X equipment: Testing administered by PT  Date of testin2023  ROM 0-60   Max Peak Torque 91   Min Peak Torque 33   Flex/Ext Ratio 2.77:1   % below normative data -46   % increase in strength 14%        Limitation/Restriction for FOTO Lumbar  "Survey     Therapist reviewed FOTO scores for Radha Tabor on 5/24/2023.   FOTO documents entered into [facility] - see Media section.     Visit 1 Score: 14%  Visit 5 Score: 5%  Visit 10 Score: 16%  Discharge Score:   Goal Score: 5%        Treatment     Radha received the treatments listed below:         Radha participated in therapeutic exercises to develop  for 55 minutes including:    Exercises in bold completed:    Treadmill 5' - NP  Cat camel 2x10  Extension in Lying x10   +Rocking madiha pose  +Thread the needle  Glut bridge 5", x10 BTB  Dying bug x10  Bird dog (alternating) x10  +Pallof press x15 GTB  +1/2 kneeling chop RTB x15      [facility] 8/22/2023   Visit Number 11   VAS Pain Rating 0   Treadmill Time (in min.) -   Time -   Lumbar Stretches - Slouch Overcorrection -   Extension in Lying 10   Extension in Standing -   Lumbar Extension Seat Pad -   Femur Restraint -   Top Dead Center -   Counterweight -   Lumbar Flexion -   Lumbar Extension -   Lumbar Peak Torque -   Min Torque -   Test Percent Below Normative Data -   Test Percent Gain in Strength from Initial  -   Lumbar Weight 52   Repetitions 18   Rating of Perceived Exertion 6   Ice - Z Lie (in min.) -             Not performed 8/22/2023 :  Prone on elbows 1 min  Prone hip extension 2x10  Extension in standing x10  TrA isometric with ball x10  Side lying clams RTB x 10  Supine hip flexor stretch 1 min with strap  LTR with ball for Oblique activation       Completed Peripheral muscle strengthening which included one set of 15-20 repetitions at a slow and controlled 10-13 second per rep pace focused on strengthening supporting musculature in order to improve body mechanics and functional mobility. Patient and therapist focused on proper form during treatment to ensure optimal strengthening of each targeted muscle group.  Machines utilized include torso rotation, leg extension, leg curl, chest press, upright row. Tricep extension, bicep curl, leg " press, and hip abduction added visit 3      Radha received manual therapy techniques for  0 minutes. The following activities were included:    Pt given cold pack for 0 minutes to LB in Z-lie    Patient Education and Home Exercises     Home exercises include:    Extension in Lying  Cat camel  Bridges  Pallof press  Half kneeling chop    Cardio program (V5): -  Lifting education (V11): -  Posture/Lumbar roll: Has no yet obtained  [employer] Discharge handout (date given): -  Equipment at home/gym membership:     Education provided:   - PT role and POC  - HEP      Written Home Exercises Provided: Patient instructed to cont prior HEP.  Exercises were reviewed and Radha was able to demonstrate them prior to the end of the session.  Radha demonstrated  understanding of the education provided.     See EMR under  for exercises provided     Assessment     Patient Presents to therapy with no complaint of low back pain but running late therefor did not complete treadmill warm up. Completed active stretch in all planes with some complaint of discomfort in the arms. Resumed stabilization strengthening exercises and added half kneeling chop with theraband, she had some instability with the rotation movement, will continue to work on that as she has started playing golf. Attempted to add side steps with band and she started complaining of L knee pain, will discontinue at this time. Maintained resistance on the lumbar medx, she was able to complete 18 repetitions at 6/10 RPE, will progress as tolerated.     Patient is making good progress towards established goals.  Pt will continue to benefit from skilled outpatient physical therapy to address the deficits stated in the impairment chart, provide pt/family education and to maximize pt's level of independence in the home and community environment.     Pt prognosis is Excellent    Anticipated Barriers for therapy: None  Pt's spiritual, cultural and educational needs considered and  pt agreeable to plan of care and goals as stated below:     Goals:    Pt is in agreement with the following goals.     Short term goals:  6 weeks or 10 visits   - Pt will demonstrate increased lumbar ROM by at least 3 degrees from the initial ROM value with improvements noted in functional ROM and ability to perform ADLs. MET 8/17  - Pt will demonstrate increased MedX average isometric strength value by 15% from initial test resulting in improved ability to perform bending, lifting, and carrying activities safely, confidently. Partially progressed and Ongoing  - Pt will report a reduction in worst pain score by 1-2 points for improved tolerance for transitional movements. MET 8/17  - Pt able to perform HEP correctly with minimal cueing or supervision from therapist to encourage independent management of symptoms. MET 8/17     Long term goals: 10 weeks or 20 visits   - Pt will demonstrate increased lumbar ROM by at least 6 degrees from initial ROM value, resulting in improved ability to perform functional forward bending while standing and sitting. MET 8/17  - Pt will demonstrate increased MedX average isometric strength value by 30% from initial test resulting in improved ability to perform bending, lifting, and carrying activities safely and confidently. Appropriate and Ongoing  - Pt to demonstrate ability to independently control and reduce their pain through posture positioning and mechanical movements throughout a typical day. Appropriate and Ongoing  - Pt will demonstrate reduced pain and improved functional outcomes as reported on the FOTO by reaching a limitation score of < or = 5% or less in order to demonstrate subjective improvement in pt's condition.   Appropriate and Ongoing  - Pt will demonstrate independence with the HEP at discharge. Appropriate and Ongoing  - Pt will be able to turn over in bed without increase in low back pain (patient goal) Appropriate and Ongoing  - Pt will be able to transfer  from sit to stand without increase in pain (patient goal) Appropriate and Ongoing       Plan   Outpatient physical therapy 2x week for 10 weeks or 20 visits to include the following:   - Patient education  - Therapeutic exercise  - Manual therapy  - Performance testing   - Neuromuscular Re-education  - Therapeutic activity   - Modalities     Pt may be seen by PTA as part of the rehabilitation team.     Therapist: Gagan Oliver, PT  8/22/2023

## 2023-08-24 ENCOUNTER — CLINICAL SUPPORT (OUTPATIENT)
Dept: REHABILITATION | Facility: OTHER | Age: 48
End: 2023-08-24
Payer: COMMERCIAL

## 2023-08-24 DIAGNOSIS — R52 ALTERATION IN MOBILITY ASSOCIATED WITH PAIN: ICD-10-CM

## 2023-08-24 DIAGNOSIS — R29.898 DECREASED STRENGTH OF TRUNK AND BACK: Primary | ICD-10-CM

## 2023-08-24 DIAGNOSIS — Z78.9 ALTERATION IN MOBILITY ASSOCIATED WITH PAIN: ICD-10-CM

## 2023-08-24 PROCEDURE — 97110 THERAPEUTIC EXERCISES: CPT

## 2023-08-24 NOTE — PROGRESS NOTES
[facility]  Physical Therapy Treatment Note     Name: Radha BERNAL [facility] Number: [id number]    Therapy Diagnosis:   Encounter Diagnoses   Name Primary?    Decreased strength of trunk and back Yes    Alteration in mobility associated with pain          Physician: Mariya Cheema NP    Visit Date: 2023    Physician Orders: PT Eval and Treat  Medical Diagnosis from Referral:   M54.9 (ICD-10-CM) - Dorsalgia, unspecified    M47.817 (ICD-10-CM) - Spondylosis without myelopathy or radiculopathy, lumbosacral region    M79.18 (ICD-10-CM) - Myofascial pain  Evaluation Date: 2023  Authorization Period Expiration: 2023  Plan of Care Expiration: 2023  Reassessment Due: 2023  Visit # / Visits authorized:      Time In: 3:37  Time Out: 4:25  Total Billable Time: 48 minutes  INSURANCE and OUTCOMES: Value Based Insurance with FOTO Outcomes 1/3     Precautions: standard     Pattern of pain determined: 1 PEP    Subjective     Radha reports the back is feeling good and she can see her waist. She is playing golf after therapy today and in a tournament in a few weeks for fun.    Patient reports tolerating previous visit without adverse effects.   Patient reports their pain to be 0/10 on a 0-10 scale with 0 being no pain and 10 being the worst pain imaginable.  Pain Location: [facility]     Occupation:  Does administrative desk work  Leisure:  Likes [employer] and [employer] movies    Pts goals: get stronger  Objective    Baseline Isometric Testing on Med X equipment: Testing administered by PT     Date of testin2023  ROM 0-54 deg   Max Peak Torque 85    Min Peak Torque 39   Flex/Ext Ratio 2.17   % below normative data 54      Midpoint Isometric Testing on Med X equipment: Testing administered by PT  Date of testin2023  ROM 0-60   Max Peak Torque 91   Min Peak Torque 33   Flex/Ext Ratio 2.77:1   % below normative data -46   % increase in strength  "14%        Limitation/Restriction for FOTO Lumbar Survey     Therapist reviewed FOTO scores for Radha Tabor on 5/24/2023.   FOTO documents entered into [employer] - see Media section.     Visit 1 Score: 14%  Visit 5 Score: 5%  Visit 10 Score: 16%  Discharge Score:   Goal Score: 5%        Treatment     Radha received the treatments listed below:         Radha participated in therapeutic exercises to develop  for 55 minutes including:    Exercises in bold completed:    Treadmill 6'   Cat camel x10  Rocking madiha pose x10  Thread the needle x10  Extension in Lying x10   Glut bridge 5", x10 BTB  +Bridge with kick 5x2  Dying bug x10  Bird dog (alternating) x10 -NP  Pallof press x10 GTB  +1/2 kneeling chop RTB x10 (outside and inside knee up) bilat       8/24/2023    [facility]   Visit Number 12   VAS Pain Rating 0   Treadmill Time (in min.) 6 min   Extension in Lying 10   Lumbar Weight 52 lbs   Repetitions 15   Rating of Perceived Exertion 7           Not performed 8/24/2023 :  Prone on elbows 1 min  Prone hip extension 2x10  Extension in standing x10  TrA isometric with ball x10  Side lying clams RTB x 10  Supine hip flexor stretch 1 min with strap  LTR with ball for Oblique activation       Completed Peripheral muscle strengthening which included one set of 15-20 repetitions at a slow and controlled 10-13 second per rep pace focused on strengthening supporting musculature in order to improve body mechanics and functional mobility. Patient and therapist focused on proper form during treatment to ensure optimal strengthening of each targeted muscle group.  Machines utilized include torso rotation, leg extension, leg curl, chest press, upright row. Tricep extension, bicep curl, leg press, and hip abduction added visit 3      Radha received manual therapy techniques for  0 minutes. The following activities were included:    Pt given cold pack for 0 minutes to LB in Z-lie    Patient Education and Home Exercises     Home " exercises include:    Extension in Lying  Cat camel  Bridges  Pallof press  Half kneeling chop    Cardio program (V5): -  Lifting education (V11): -  Posture/Lumbar roll: Has no yet obtained  [employer] Discharge handout (date given): -  Equipment at home/gym membership:     Education provided:   - PT role and POC  - HEP      Written Home Exercises Provided: Patient instructed to cont prior HEP.  Exercises were reviewed and Radha was able to demonstrate them prior to the end of the session.  Radha demonstrated  understanding of the education provided.     See EMR under  for exercises provided     Assessment     Patient Presents to therapy with no complaint of low back pain and reports not completing stretches yet today. Completed active stretch in all planes with some complaint of discomfort in the arms PT required cues for head position with thoracic rotation. Resumed stabilization strengthening exercises and added half kneeling chop with theraband, she had some instability with the rotation movement using kneeling on both sides as well as adding single leg bridge for unilateral multifidus and lumbar extensor strengthening. Maintained resistance on the lumbar medx, she was able to complete 15 repetitions at 7/10 RPE, with complaints of discomfort at the small of the back. Discussed possible increased in discomfort because of the increased demand that was put on the back by added exercises, she was able to complete peripheral exercises without increased difficulty, will progress as tolerated.     Patient is making good progress towards established goals.  Pt will continue to benefit from skilled outpatient physical therapy to address the deficits stated in the impairment chart, provide pt/family education and to maximize pt's level of independence in the home and community environment.     Pt prognosis is Excellent    Anticipated Barriers for therapy: None  Pt's spiritual, cultural and educational needs considered  and pt agreeable to plan of care and goals as stated below:     Goals:    Pt is in agreement with the following goals.     Short term goals:  6 weeks or 10 visits   - Pt will demonstrate increased lumbar ROM by at least 3 degrees from the initial ROM value with improvements noted in functional ROM and ability to perform ADLs. MET 8/17  - Pt will demonstrate increased MedX average isometric strength value by 15% from initial test resulting in improved ability to perform bending, lifting, and carrying activities safely, confidently. Partially progressed and Ongoing  - Pt will report a reduction in worst pain score by 1-2 points for improved tolerance for transitional movements. MET 8/17  - Pt able to perform HEP correctly with minimal cueing or supervision from therapist to encourage independent management of symptoms. MET 8/17     Long term goals: 10 weeks or 20 visits   - Pt will demonstrate increased lumbar ROM by at least 6 degrees from initial ROM value, resulting in improved ability to perform functional forward bending while standing and sitting. MET 8/17  - Pt will demonstrate increased MedX average isometric strength value by 30% from initial test resulting in improved ability to perform bending, lifting, and carrying activities safely and confidently. Appropriate and Ongoing  - Pt to demonstrate ability to independently control and reduce their pain through posture positioning and mechanical movements throughout a typical day. Appropriate and Ongoing  - Pt will demonstrate reduced pain and improved functional outcomes as reported on the FOTO by reaching a limitation score of < or = 5% or less in order to demonstrate subjective improvement in pt's condition.   Appropriate and Ongoing  - Pt will demonstrate independence with the HEP at discharge. Appropriate and Ongoing  - Pt will be able to turn over in bed without increase in low back pain (patient goal) Appropriate and Ongoing  - Pt will be able to transfer  from sit to stand without increase in pain (patient goal) Appropriate and Ongoing       Plan   Outpatient physical therapy 2x week for 10 weeks or 20 visits to include the following:   - Patient education  - Therapeutic exercise  - Manual therapy  - Performance testing   - Neuromuscular Re-education  - Therapeutic activity   - Modalities     Pt may be seen by PTA as part of the rehabilitation team.     Therapist: Gagan Oliver, PT  8/24/2023

## 2023-09-05 ENCOUNTER — CLINICAL SUPPORT (OUTPATIENT)
Dept: REHABILITATION | Facility: OTHER | Age: 48
End: 2023-09-05
Payer: COMMERCIAL

## 2023-09-05 DIAGNOSIS — R29.898 DECREASED STRENGTH OF TRUNK AND BACK: Primary | ICD-10-CM

## 2023-09-05 DIAGNOSIS — R52 ALTERATION IN MOBILITY ASSOCIATED WITH PAIN: ICD-10-CM

## 2023-09-05 DIAGNOSIS — Z78.9 ALTERATION IN MOBILITY ASSOCIATED WITH PAIN: ICD-10-CM

## 2023-09-05 PROCEDURE — 97110 THERAPEUTIC EXERCISES: CPT

## 2023-09-05 PROCEDURE — 97530 THERAPEUTIC ACTIVITIES: CPT

## 2023-09-05 PROCEDURE — 97112 NEUROMUSCULAR REEDUCATION: CPT

## 2023-09-05 NOTE — PROGRESS NOTES
[facility]  Physical Therapy Treatment Note     Name: Radha BERNAL [facility] Number: [id number]    Therapy Diagnosis:   Encounter Diagnoses   Name Primary?    Decreased strength of trunk and back Yes    Alteration in mobility associated with pain          Physician: Mariya Cheema NP    Visit Date: 2023    Physician Orders: PT Eval and Treat  Medical Diagnosis from Referral:   M54.9 (ICD-10-CM) - Dorsalgia, unspecified    M47.817 (ICD-10-CM) - Spondylosis without myelopathy or radiculopathy, lumbosacral region    M79.18 (ICD-10-CM) - Myofascial pain  Evaluation Date: 2023  Authorization Period Expiration: 2023  Plan of Care Expiration: 2023  Reassessment Due: 2023  Visit # / Visits authorized:      Time In: 10:50  Time Out: 11:50  Total Billable Time: 60 minutes  INSURANCE and OUTCOMES: Value Based Insurance with FOTO Outcomes 1/3     Precautions: standard     Pattern of pain determined: 1 PEP    Subjective     Radha reports she was traveling for work in the car for long stretches and it did not aggravate the back. She is doing a new challenge where she gets 7000 steps a day.    Patient reports tolerating previous visit without adverse effects.   Patient reports their pain to be 0/10 on a 0-10 scale with 0 being no pain and 10 being the worst pain imaginable.  Pain Location: [facility]     Occupation:  Does administrative desk work  Leisure:  Likes [employer] and [employer] movies    Pts goals: get stronger  Objective    Baseline Isometric Testing on Med X equipment: Testing administered by PT     Date of testin2023  ROM 0-54 deg   Max Peak Torque 85    Min Peak Torque 39   Flex/Ext Ratio 2.17   % below normative data 54      Midpoint Isometric Testing on Med X equipment: Testing administered by PT  Date of testin2023  ROM 0-60   Max Peak Torque 91   Min Peak Torque 33   Flex/Ext Ratio 2.77:1   % below normative data -46   %  increase in strength 14%        Limitation/Restriction for FOTO Lumbar Survey     Therapist reviewed FOTO scores for Radha Tabor on 5/24/2023.   FOTO documents entered into [employer] - see Media section.     Visit 1 Score: 14%  Visit 5 Score: 5%  Visit 10 Score: 16%  Discharge Score:   Goal Score: 5%        Treatment     Radha received the treatments listed below:      Radha participated in neuromuscular re-education activities to improve balance, coordination, proprioception, motor control and/or posture for 15 minutes. The following activities were included:    Bird dog (alternating) x10  Pallof press x10 BTB  Cat camel x10  1/2 kneeling chop RTB x10 (outside and inside knee up) thania Garcia participated in therapeutic exercises to develop  for 35 minutes including:    Exercises in bold completed:    Treadmill 6'   Rocking madiha pose x10  +1/2 kneeling open book 10  Extension in Lying x10   Bridge with kick 5x2          9/5/2023    11:31 AM   [facility]   Visit Number 13   VAS Pain Rating 0   Treadmill Time (in min.) 5 min   Extension in Lying 10   Lumbar Weight 52 lbs   Repetitions 18   Rating of Perceived Exertion 5   Ice - Z Lie (in min.) 5         Not performed 9/5/2023 :  Prone on elbows 1 min  Prone hip extension 2x10  Extension in standing x10  TrA isometric with ball x10  Side lying clams RTB x 10  Supine hip flexor stretch 1 min with strap  LTR with ball for Oblique activation       Completed Peripheral muscle strengthening which included one set of 15-20 repetitions at a slow and controlled 10-13 second per rep pace focused on strengthening supporting musculature in order to improve body mechanics and functional mobility. Patient and therapist focused on proper form during treatment to ensure optimal strengthening of each targeted muscle group.  Machines utilized include torso rotation, leg extension, leg curl, chest press, upright row. Tricep extension, bicep curl, leg press, and hip abduction  added visit 3    Radha participated in dynamic functional therapeutic activities to improve functional performance and simulate household and community activities for 10  minutes. The following activities were included:    +Squat with unilateral lift 15# 5x2 bilat  +Shannon carry 15#    Radha received manual therapy techniques for  0 minutes. The following activities were included:    Pt given cold pack for 0 minutes to LB in Z-lie    Patient Education and Home Exercises     Home exercises include:    Extension in Lying  Cat camel  Bridges  Pallof press  Half kneeling chop    Cardio program (V5): -  Lifting education (V11): -  Posture/Lumbar roll: Has no yet obtained  [employer] Discharge handout (date given): -  Equipment at home/gym membership:     Education provided:   - PT role and POC  - HEP      Written Home Exercises Provided: Patient instructed to cont prior HEP.  Exercises were reviewed and Radha was able to demonstrate them prior to the end of the session.  Radha demonstrated  understanding of the education provided.     See EMR under  for exercises provided     Assessment     Radha presents to therapy with reports of feeling good and has been more active consecutive days. Resumed stretches with modification of of thread the needle to half kneeling open book. Continued with isometric/ stability core strengthening and added unilateral lift and farmers carry as well as progressing resistance band for pallof press. She was able to complete with moderate cues for posture and proper execution. Will continue to work on functional mobility. Maintained resistance on the lumbar medx and he was able to complete 18 repetitions at 5/10 RPE, plan to progress as tolerated.    Patient is making good progress towards established goals.  Pt will continue to benefit from skilled outpatient physical therapy to address the deficits stated in the impairment chart, provide pt/family education and to maximize pt's level of  independence in the home and community environment.     Pt prognosis is Excellent    Anticipated Barriers for therapy: None  Pt's spiritual, cultural and educational needs considered and pt agreeable to plan of care and goals as stated below:     Goals:    Pt is in agreement with the following goals.     Short term goals:  6 weeks or 10 visits   - Pt will demonstrate increased lumbar ROM by at least 3 degrees from the initial ROM value with improvements noted in functional ROM and ability to perform ADLs. MET 8/17  - Pt will demonstrate increased MedX average isometric strength value by 15% from initial test resulting in improved ability to perform bending, lifting, and carrying activities safely, confidently. Partially progressed and Ongoing  - Pt will report a reduction in worst pain score by 1-2 points for improved tolerance for transitional movements. MET 8/17  - Pt able to perform HEP correctly with minimal cueing or supervision from therapist to encourage independent management of symptoms. MET 8/17     Long term goals: 10 weeks or 20 visits   - Pt will demonstrate increased lumbar ROM by at least 6 degrees from initial ROM value, resulting in improved ability to perform functional forward bending while standing and sitting. MET 8/17  - Pt will demonstrate increased MedX average isometric strength value by 30% from initial test resulting in improved ability to perform bending, lifting, and carrying activities safely and confidently. Appropriate and Ongoing  - Pt to demonstrate ability to independently control and reduce their pain through posture positioning and mechanical movements throughout a typical day. Appropriate and Ongoing  - Pt will demonstrate reduced pain and improved functional outcomes as reported on the FOTO by reaching a limitation score of < or = 5% or less in order to demonstrate subjective improvement in pt's condition.   Appropriate and Ongoing  - Pt will demonstrate independence with the  HEP at discharge. Appropriate and Ongoing  - Pt will be able to turn over in bed without increase in low back pain (patient goal) Appropriate and Ongoing  - Pt will be able to transfer from sit to stand without increase in pain (patient goal) Appropriate and Ongoing       Plan   Outpatient physical therapy 2x week for 10 weeks or 20 visits to include the following:   - Patient education  - Therapeutic exercise  - Manual therapy  - Performance testing   - Neuromuscular Re-education  - Therapeutic activity   - Modalities     Pt may be seen by PTA as part of the rehabilitation team.     Therapist: Gagan Oliver, PT  9/5/2023

## 2023-09-07 ENCOUNTER — CLINICAL SUPPORT (OUTPATIENT)
Dept: REHABILITATION | Facility: OTHER | Age: 48
End: 2023-09-07
Payer: COMMERCIAL

## 2023-09-07 DIAGNOSIS — Z78.9 ALTERATION IN MOBILITY ASSOCIATED WITH PAIN: ICD-10-CM

## 2023-09-07 DIAGNOSIS — R52 ALTERATION IN MOBILITY ASSOCIATED WITH PAIN: ICD-10-CM

## 2023-09-07 DIAGNOSIS — R29.898 DECREASED STRENGTH OF TRUNK AND BACK: Primary | ICD-10-CM

## 2023-09-07 PROCEDURE — 97530 THERAPEUTIC ACTIVITIES: CPT

## 2023-09-07 PROCEDURE — 97110 THERAPEUTIC EXERCISES: CPT

## 2023-09-07 PROCEDURE — 97112 NEUROMUSCULAR REEDUCATION: CPT

## 2023-09-07 NOTE — PROGRESS NOTES
[facility]  Physical Therapy Treatment Note     Name: Radha BERNAL [facility] Number: [id number]    Therapy Diagnosis:   Encounter Diagnoses   Name Primary?    Decreased strength of trunk and back Yes    Alteration in mobility associated with pain          Physician: Mariya Cheema NP    Visit Date: 2023    Physician Orders: PT Eval and Treat  Medical Diagnosis from Referral:   M54.9 (ICD-10-CM) - Dorsalgia, unspecified    M47.817 (ICD-10-CM) - Spondylosis without myelopathy or radiculopathy, lumbosacral region    M79.18 (ICD-10-CM) - Myofascial pain  Evaluation Date: 2023  Authorization Period Expiration: 2023  Plan of Care Expiration: 2023  Reassessment Due: 2023  Visit # / Visits authorized:      Time In: 15:40  Time Out: 16:35  Total Billable Time: 55 minutes  INSURANCE and OUTCOMES: Value Based Insurance with FOTO Outcomes 1/3     Precautions: standard     Pattern of pain determined: 1 PEP    Subjective     Radha denies pain or discomfort at this time, she is walking more and would like to start some type of dancing classes    Patient reports tolerating previous visit without adverse effects.   Patient reports their pain to be 0/10 on a 0-10 scale with 0 being no pain and 10 being the worst pain imaginable.  Pain Location: [facility]     Occupation:  Does administrative desk work  Leisure:  Likes "[employer] and [employer]    Pts goals: get stronger  Objective    Baseline Isometric Testing on Med X equipment: Testing administered by PT     Date of testin2023  ROM 0-54 deg   Max Peak Torque 85    Min Peak Torque 39   Flex/Ext Ratio 2.17   % below normative data 54      Midpoint Isometric Testing on Med X equipment: Testing administered by PT  Date of testin2023  ROM 0-60   Max Peak Torque 91   Min Peak Torque 33   Flex/Ext Ratio 2.77:1   % below normative data -46   % increase in strength 14%         Limitation/Restriction for FOTO Lumbar Survey     Therapist reviewed FOTO scores for Radha Tabor on 5/24/2023.   FOTO documents entered into [employer] - see Media section.     Visit 1 Score: 14%  Visit 5 Score: 5%  Visit 10 Score: 16%  Discharge Score:   Goal Score: 5%        Treatment     Radha received the treatments listed below:      Radha participated in neuromuscular re-education activities to improve balance, coordination, proprioception, motor control and/or posture for 15 minutes. The following activities were included:    Bird dog (alternating) x10  Pallof press x15 BTB stagger stance on foam  Cat camel x10  1/2 kneeling chop GTB x10 (outside and inside knee up) bilat     Radha participated in therapeutic exercises to develop  for 35 minutes including:    Exercises in bold completed:    Treadmill 6' - NP  Rocking madiha pose x10  1/2 kneeling open book 10  Extension in Lying x10   Bridge with kick 5x2 - NP          9/7/2023     6:31 AM   [facility]   Visit Number 14   VAS Pain Rating 0   Extension in Lying 10   Lumbar Weight 52 lbs   Repetitions 18   Rating of Perceived Exertion 6   Ice - Z Lie (in min.) 5           Not performed 9/7/2023 :  Prone on elbows 1 min  Prone hip extension 2x10  Extension in standing x10  TrA isometric with ball x10  Side lying clams RTB x 10  Supine hip flexor stretch 1 min with strap  LTR with ball for Oblique activation       Completed Peripheral muscle strengthening which included one set of 15-20 repetitions at a slow and controlled 10-13 second per rep pace focused on strengthening supporting musculature in order to improve body mechanics and functional mobility. Patient and therapist focused on proper form during treatment to ensure optimal strengthening of each targeted muscle group.  Machines utilized include torso rotation, leg extension, leg curl, chest press, upright row. Tricep extension, bicep curl, leg press, and hip abduction added visit 3    Radha  participated in dynamic functional therapeutic activities to improve functional performance and simulate household and community activities for 10  minutes. The following activities were included:    Squat with unilateral lift 15# 5x2 bilat  [place] carry 15#    Radha received manual therapy techniques for  0 minutes. The following activities were included:    Pt given cold pack for 0 minutes to LB in Z-lie    Patient Education and Home Exercises     Home exercises include:    Extension in Lying  Cat camel  Bridges  Pallof press  Half kneeling chop    Cardio program (V5): -  Lifting education (V11): -  Posture/Lumbar roll: Has no yet obtained  [employer] Discharge handout (date given): -  Equipment at home/gym membership:     Education provided:   - PT role and POC  - HEP      Written Home Exercises Provided: Patient instructed to cont prior HEP.  Exercises were reviewed and Radha was able to demonstrate them prior to the end of the session.  Radha demonstrated  understanding of the education provided.     See EMR under  for exercises provided     Assessment     Radha presents to therapy with reports of feeling good and has been more active consecutive days. [facility]. Continued with isometric/ stability core strengthening and added unilateral lift and farmers carry as well as progressing difficulty with uneven surface for pallof press. She was able to complete with moderate cues for posture and proper execution. Will continue to work on functional mobility and spinal/core stability. Maintained resistance on the lumbar medx and she was able to complete 18 repetitions at 6/10 RPE with difficulty in last couple repetitions, reports after the exercise she feels better but the muscles get very fatigued, plan to progress as tolerated.    Patient is making good progress towards established goals.  Pt will continue to benefit from skilled outpatient physical therapy to address the  deficits stated in the impairment chart, provide pt/family education and to maximize pt's level of independence in the home and community environment.     Pt prognosis is Excellent    Anticipated Barriers for therapy: None  Pt's spiritual, cultural and educational needs considered and pt agreeable to plan of care and goals as stated below:     Goals:    Pt is in agreement with the following goals.     Short term goals:  6 weeks or 10 visits   - Pt will demonstrate increased lumbar ROM by at least 3 degrees from the initial ROM value with improvements noted in functional ROM and ability to perform ADLs. MET 8/17  - Pt will demonstrate increased MedX average isometric strength value by 15% from initial test resulting in improved ability to perform bending, lifting, and carrying activities safely, confidently. Partially progressed and Ongoing  - Pt will report a reduction in worst pain score by 1-2 points for improved tolerance for transitional movements. MET 8/17  - Pt able to perform HEP correctly with minimal cueing or supervision from therapist to encourage independent management of symptoms. MET 8/17     Long term goals: 10 weeks or 20 visits   - Pt will demonstrate increased lumbar ROM by at least 6 degrees from initial ROM value, resulting in improved ability to perform functional forward bending while standing and sitting. MET 8/17  - Pt will demonstrate increased MedX average isometric strength value by 30% from initial test resulting in improved ability to perform bending, lifting, and carrying activities safely and confidently. Appropriate and Ongoing  - Pt to demonstrate ability to independently control and reduce their pain through posture positioning and mechanical movements throughout a typical day. Appropriate and Ongoing  - Pt will demonstrate reduced pain and improved functional outcomes as reported on the FOTO by reaching a limitation score of < or = 5% or less in order to demonstrate subjective  improvement in pt's condition.   Appropriate and Ongoing  - Pt will demonstrate independence with the HEP at discharge. Appropriate and Ongoing  - Pt will be able to turn over in bed without increase in low back pain (patient goal) Appropriate and Ongoing  - Pt will be able to transfer from sit to stand without increase in pain (patient goal) Appropriate and Ongoing       Plan   Outpatient physical therapy 2x week for 10 weeks or 20 visits to include the following:   - Patient education  - Therapeutic exercise  - Manual therapy  - Performance testing   - Neuromuscular Re-education  - Therapeutic activity   - Modalities     Pt may be seen by PTA as part of the rehabilitation team.     Therapist: Gagan Oliver, PT  9/7/2023

## 2023-09-12 ENCOUNTER — CLINICAL SUPPORT (OUTPATIENT)
Dept: REHABILITATION | Facility: OTHER | Age: 48
End: 2023-09-12
Payer: COMMERCIAL

## 2023-09-12 DIAGNOSIS — R29.898 DECREASED STRENGTH OF TRUNK AND BACK: Primary | ICD-10-CM

## 2023-09-12 DIAGNOSIS — R52 ALTERATION IN MOBILITY ASSOCIATED WITH PAIN: ICD-10-CM

## 2023-09-12 DIAGNOSIS — Z78.9 ALTERATION IN MOBILITY ASSOCIATED WITH PAIN: ICD-10-CM

## 2023-09-12 PROCEDURE — 97112 NEUROMUSCULAR REEDUCATION: CPT

## 2023-09-12 PROCEDURE — 97110 THERAPEUTIC EXERCISES: CPT

## 2023-09-12 NOTE — PROGRESS NOTES
[facility]  Physical Therapy Treatment Note     Name: Radha BERNAL [facility] Number: [id number]    Therapy Diagnosis:   Encounter Diagnoses   Name Primary?    Decreased strength of trunk and back Yes    Alteration in mobility associated with pain          Physician: Mariya Cheema NP    Visit Date: 2023    Physician Orders: PT Eval and Treat  Medical Diagnosis from Referral:   M54.9 (ICD-10-CM) - Dorsalgia, unspecified    M47.817 (ICD-10-CM) - Spondylosis without myelopathy or radiculopathy, lumbosacral region    M79.18 (ICD-10-CM) - Myofascial pain  Evaluation Date: 2023  Authorization Period Expiration: 2023  Plan of Care Expiration: 2023  Reassessment Due: 2023  Visit # / Visits authorized: 15/20     Time In: 11:28  Time Out: 12:23  Total Billable Time: 54 minutes  INSURANCE and OUTCOMES: Value Based Insurance with FOTO Outcomes 1/3     Precautions: standard     Pattern of pain determined: 1 PEP    Subjective     Radha denies pain or discomfort at this time    Patient reports tolerating previous visit without adverse effects.   Patient reports their pain to be 0/10 on a 0-10 scale with 0 being no pain and 10 being the worst pain imaginable.  Pain Location: [facility]     Occupation:  Does administrative desk work  Leisure:  Likes [employer] and [employer]    Pts goals: get stronger  Objective    Baseline Isometric Testing on Med X equipment: Testing administered by PT     Date of testin2023  ROM 0-54 deg   Max Peak Torque 85    Min Peak Torque 39   Flex/Ext Ratio 2.17   % below normative data 54      Midpoint Isometric Testing on Med X equipment: Testing administered by PT  Date of testin2023  ROM 0-60   Max Peak Torque 91   Min Peak Torque 33   Flex/Ext Ratio 2.77:1   % below normative data -46   % increase in strength 14%        Limitation/Restriction for FOTO Lumbar Survey     Therapist reviewed FOTO scores for Radha BERNAL  "Sharona on 5/24/2023.   FOTO documents entered into [employer] - see Media section.     Visit 1 Score: 14%  Visit 5 Score: 5%  Visit 10 Score: 16%  Discharge Score:   Goal Score: 5%        Treatment     Radha received the treatments listed below:      Radha participated in neuromuscular re-education activities to improve balance, coordination, proprioception, motor control and/or posture for 15 minutes. The following activities were included:    Bird dog (alternating) x5  +Bird dog position row 7.5# KB 5x2  Pallof press x15 GTB stagger stance on foam  Cat camel x10  NP- 1/2 kneeling chop GTB x10 (outside and inside knee up) bilat     Radha participated in therapeutic exercises to develop  for 35 minutes including:    Exercises in bold completed:    Treadmill 6'   Rocking madiha pose x10  1/2 kneeling open book 10  Extension in Lying x10   Bridge with kick 5x2 - NP  +Forearm plank 3x20"  +Side forearm plank 2x20"          9/12/2023    12:02 PM   [facility]   Visit Number 15   VAS Pain Rating 5   Treadmill Time (in min.) 5 min   Extension in Lying 10   Lumbar Weight 52 lbs   Repetitions 20   Rating of Perceived Exertion 3   Ice - Z Lie (in min.) 5         Not performed 9/12/2023 :  Prone on elbows 1 min  Prone hip extension 2x10  Extension in standing x10  TrA isometric with ball x10  Side lying clams RTB x 10  Supine hip flexor stretch 1 min with strap  LTR with ball for Oblique activation       Completed Peripheral muscle strengthening which included one set of 15-20 repetitions at a slow and controlled 10-13 second per rep pace focused on strengthening supporting musculature in order to improve body mechanics and functional mobility. Patient and therapist focused on proper form during treatment to ensure optimal strengthening of each targeted muscle group.  Machines utilized include torso rotation, leg extension, leg curl, chest press, upright row. Tricep extension, bicep curl, leg press, and hip abduction added " visit 3    Radha participated in dynamic functional therapeutic activities to improve functional performance and simulate household and community activities for 00  minutes. The following activities were included:    NP-Squat with unilateral lift 15# 5x2 bilat  NP-[place] carry 15#    Radha received manual therapy techniques for  0 minutes. The following activities were included:    Pt given cold pack for 0 minutes to LB in Z-lie    Patient Education and Home Exercises     Home exercises include:    Extension in Lying  Cat camel  Bridges  Pallof press  Half kneeling chop    Cardio program (V5): -  Lifting education (V11): -  Posture/Lumbar roll: Has no yet obtained  [employer] Discharge handout (date given): -  Equipment at home/gym membership:     Education provided:   - PT role and POC  - HEP      Written Home Exercises Provided: Patient instructed to cont prior HEP.  Exercises were reviewed and Radha was able to demonstrate them prior to the end of the session.  Radha demonstrated  understanding of the education provided.     See EMR under  for exercises provided     Assessment     Radha presents to therapy with reports of feeling good and has continued her increased activity with steps and recreational activities. Resumed stability exercises and progressed to unilateral strengthening with bird dog position, she continues to be challenged in this position especially when balancing on the R and and L knee. She was able to complete the exercises with mod tactile cues. Completed forearm plank and side plank, pt expressed enthusiasm when completing because she didn't think she would do a plank. Resumed resistance on the lumbar medx and she was able to complete 20 repetitions at 3/10 RPE and plan to increase resistance next visit.     Patient is making good progress towards established goals.  Pt will continue to benefit from skilled outpatient physical therapy to address the deficits stated in the impairment chart,  provide pt/family education and to maximize pt's level of independence in the home and community environment.     Pt prognosis is Excellent    Anticipated Barriers for therapy: None  Pt's spiritual, cultural and educational needs considered and pt agreeable to plan of care and goals as stated below:     Goals:    Pt is in agreement with the following goals.     Short term goals:  6 weeks or 10 visits   - Pt will demonstrate increased lumbar ROM by at least 3 degrees from the initial ROM value with improvements noted in functional ROM and ability to perform ADLs. MET 8/17  - Pt will demonstrate increased MedX average isometric strength value by 15% from initial test resulting in improved ability to perform bending, lifting, and carrying activities safely, confidently. Partially progressed and Ongoing  - Pt will report a reduction in worst pain score by 1-2 points for improved tolerance for transitional movements. MET 8/17  - Pt able to perform HEP correctly with minimal cueing or supervision from therapist to encourage independent management of symptoms. MET 8/17     Long term goals: 10 weeks or 20 visits   - Pt will demonstrate increased lumbar ROM by at least 6 degrees from initial ROM value, resulting in improved ability to perform functional forward bending while standing and sitting. MET 8/17  - Pt will demonstrate increased MedX average isometric strength value by 30% from initial test resulting in improved ability to perform bending, lifting, and carrying activities safely and confidently. Appropriate and Ongoing  - Pt to demonstrate ability to independently control and reduce their pain through posture positioning and mechanical movements throughout a typical day. Appropriate and Ongoing  - Pt will demonstrate reduced pain and improved functional outcomes as reported on the FOTO by reaching a limitation score of < or = 5% or less in order to demonstrate subjective improvement in pt's condition.   Appropriate  and Ongoing  - Pt will demonstrate independence with the HEP at discharge. Appropriate and Ongoing  - Pt will be able to turn over in bed without increase in low back pain (patient goal) Appropriate and Ongoing  - Pt will be able to transfer from sit to stand without increase in pain (patient goal) Appropriate and Ongoing       Plan   Outpatient physical therapy 2x week for 10 weeks or 20 visits to include the following:   - Patient education  - Therapeutic exercise  - Manual therapy  - Performance testing   - Neuromuscular Re-education  - Therapeutic activity   - Modalities     Pt may be seen by PTA as part of the rehabilitation team.     Therapist: Gagan Oliver, PT  9/12/2023

## 2023-09-15 ENCOUNTER — CLINICAL SUPPORT (OUTPATIENT)
Dept: REHABILITATION | Facility: OTHER | Age: 48
End: 2023-09-15
Payer: COMMERCIAL

## 2023-09-15 DIAGNOSIS — Z78.9 ALTERATION IN MOBILITY ASSOCIATED WITH PAIN: ICD-10-CM

## 2023-09-15 DIAGNOSIS — R52 ALTERATION IN MOBILITY ASSOCIATED WITH PAIN: ICD-10-CM

## 2023-09-15 DIAGNOSIS — R29.898 DECREASED STRENGTH OF TRUNK AND BACK: Primary | ICD-10-CM

## 2023-09-15 PROCEDURE — 97112 NEUROMUSCULAR REEDUCATION: CPT

## 2023-09-15 PROCEDURE — 97110 THERAPEUTIC EXERCISES: CPT

## 2023-09-15 NOTE — PROGRESS NOTES
[facility]  Physical Therapy Treatment Note     Name: Radha BERNAL Tabor  [facility] Number: [id number]    Therapy Diagnosis:   Encounter Diagnoses   Name Primary?    Decreased strength of trunk and back Yes    Alteration in mobility associated with pain          Physician: Mariya Cheema NP    Visit Date: 9/15/2023    Physician Orders: PT Eval and Treat  Medical Diagnosis from Referral:   M54.9 (ICD-10-CM) - Dorsalgia, unspecified    M47.817 (ICD-10-CM) - Spondylosis without myelopathy or radiculopathy, lumbosacral region    M79.18 (ICD-10-CM) - Myofascial pain  Evaluation Date: 2023  Authorization Period Expiration: 2023  Plan of Care Expiration: 2023  Reassessment Due: 2023  Visit # / Visits authorized:      Time In: 10:19  Time Out: 11:07  Total Billable Time: 48 minutes  INSURANCE and OUTCOMES: Value Based Insurance with FOTO Outcomes 1/3     Precautions: standard     Pattern of pain determined: 1 PEP    Subjective     Radha denies pain or discomfort at this time but is feeling fatigued at this time    Patient reports tolerating previous visit without adverse effects.   Patient reports their pain to be 0/10 on a 0-10 scale with 0 being no pain and 10 being the worst pain imaginable.  Pain Location: [facility]     Occupation:  Does administrative desk work  Leisure:  Likes [employer] and [employer]    Pts goals: get stronger  Objective    Baseline Isometric Testing on Med X equipment: Testing administered by PT     Date of testin2023  ROM 0-54 deg   Max Peak Torque 85    Min Peak Torque 39   Flex/Ext Ratio 2.17   % below normative data 54      Midpoint Isometric Testing on Med X equipment: Testing administered by PT  Date of testin2023  ROM 0-60   Max Peak Torque 91   Min Peak Torque 33   Flex/Ext Ratio 2.77:1   % below normative data -46   % increase in strength 14%        Limitation/Restriction for FOTO Lumbar Survey    "  Therapist reviewed FOTO scores for Radha Tabor on 5/24/2023.   FOTO documents entered into [employer] - see Media section.     Visit 1 Score: 14%  Visit 5 Score: 5%  Visit 10 Score: 16%  Discharge Score:   Goal Score: 5%        Treatment     Radha received the treatments listed below:      Radha participated in neuromuscular re-education activities to improve balance, coordination, proprioception, motor control and/or posture for 15 minutes. The following activities were included:    Bird dog (alternating) x5  Bird dog position row 7.5# KB 5x2  Pallof press x15 GTB stagger stance on foam  Cat camel x10  NP- 1/2 kneeling chop GTB x10 (outside and inside knee up) bilat  +Dead lift with cue for hip hinge 15# 2x10     Radha participated in therapeutic exercises to develop  for 33 minutes including:    Exercises in bold completed:    NP- Treadmill 6'   Rocking madiha pose x10  1/2 kneeling open book 10  Extension in Lying x10   Bridge with kick 5x2 - NP  +Forearm plank 3x20"  +Side forearm plank 2x20"          9/15/2023    10:51 AM   [facility]   Visit Number 16   VAS Pain Rating 0   Extension in Lying 10   Lumbar Weight 55 lbs   Repetitions 15   Rating of Perceived Exertion 5   Ice - Z Lie (in min.) 5           Not performed 9/15/2023 :  Prone on elbows 1 min  Prone hip extension 2x10  Extension in standing x10  TrA isometric with ball x10  Side lying clams RTB x 10  Supine hip flexor stretch 1 min with strap  LTR with ball for Oblique activation       Completed Peripheral muscle strengthening which included one set of 15-20 repetitions at a slow and controlled 10-13 second per rep pace focused on strengthening supporting musculature in order to improve body mechanics and functional mobility. Patient and therapist focused on proper form during treatment to ensure optimal strengthening of each targeted muscle group.  Machines utilized include torso rotation, leg extension, leg curl, chest press, upright row. Tricep " extension, bicep curl, leg press, and hip abduction added visit 3    Radha participated in dynamic functional therapeutic activities to improve functional performance and simulate household and community activities for 00  minutes. The following activities were included:    NP-Squat with unilateral lift 15# 5x2 bilat  NP-[place] carry 15#    Radha received manual therapy techniques for  0 minutes. The following activities were included:    Pt given cold pack for 0 minutes to LB in Z-lie    Patient Education and Home Exercises     Home exercises include:    Extension in Lying  Cat camel  Bridges  Pallof press  Half kneeling chop    Cardio program (V5): -  Lifting education (V11): -  Posture/Lumbar roll: Has no yet obtained  [employer] Discharge handout (date given): -  Equipment at home/gym membership:     Education provided:   - PT role and POC  - HEP      Written Home Exercises Provided: Patient instructed to cont prior HEP.  Exercises were reviewed and Radha was able to demonstrate them prior to the end of the session.  Radha demonstrated  understanding of the education provided.     See EMR under  for exercises provided     Assessment     Radha presents to therapy with reports of feeling fatigued, resumed stretches and strengthening, added dead lifts and she reports being able to feel it in the back like she feels it when on the medx. Increased resistance on the lumbar medx and she was able to complete 15 repetitions at 5/20 RPE, plan to progress as tolerated.    Patient is making good progress towards established goals.  Pt will continue to benefit from skilled outpatient physical therapy to address the deficits stated in the impairment chart, provide pt/family education and to maximize pt's level of independence in the home and community environment.     Pt prognosis is Excellent    Anticipated Barriers for therapy: None  Pt's spiritual, cultural and educational needs considered and pt agreeable to plan of care  and goals as stated below:     Goals:    Pt is in agreement with the following goals.     Short term goals:  6 weeks or 10 visits   - Pt will demonstrate increased lumbar ROM by at least 3 degrees from the initial ROM value with improvements noted in functional ROM and ability to perform ADLs. MET 8/17  - Pt will demonstrate increased MedX average isometric strength value by 15% from initial test resulting in improved ability to perform bending, lifting, and carrying activities safely, confidently. Partially progressed and Ongoing  - Pt will report a reduction in worst pain score by 1-2 points for improved tolerance for transitional movements. MET 8/17  - Pt able to perform HEP correctly with minimal cueing or supervision from therapist to encourage independent management of symptoms. MET 8/17     Long term goals: 10 weeks or 20 visits   - Pt will demonstrate increased lumbar ROM by at least 6 degrees from initial ROM value, resulting in improved ability to perform functional forward bending while standing and sitting. MET 8/17  - Pt will demonstrate increased MedX average isometric strength value by 30% from initial test resulting in improved ability to perform bending, lifting, and carrying activities safely and confidently. Appropriate and Ongoing  - Pt to demonstrate ability to independently control and reduce their pain through posture positioning and mechanical movements throughout a typical day. Appropriate and Ongoing  - Pt will demonstrate reduced pain and improved functional outcomes as reported on the FOTO by reaching a limitation score of < or = 5% or less in order to demonstrate subjective improvement in pt's condition.   Appropriate and Ongoing  - Pt will demonstrate independence with the HEP at discharge. Appropriate and Ongoing  - Pt will be able to turn over in bed without increase in low back pain (patient goal) Appropriate and Ongoing  - Pt will be able to transfer from sit to stand without  increase in pain (patient goal) Appropriate and Ongoing       Plan   Outpatient physical therapy 2x week for 10 weeks or 20 visits to include the following:   - Patient education  - Therapeutic exercise  - Manual therapy  - Performance testing   - Neuromuscular Re-education  - Therapeutic activity   - Modalities     Pt may be seen by PTA as part of the rehabilitation team.     Therapist: Gagan Oliver, PT  9/15/2023

## 2023-09-25 ENCOUNTER — CLINICAL SUPPORT (OUTPATIENT)
Dept: REHABILITATION | Facility: OTHER | Age: 48
End: 2023-09-25
Payer: COMMERCIAL

## 2023-09-25 DIAGNOSIS — Z78.9 ALTERATION IN MOBILITY ASSOCIATED WITH PAIN: ICD-10-CM

## 2023-09-25 DIAGNOSIS — R52 ALTERATION IN MOBILITY ASSOCIATED WITH PAIN: ICD-10-CM

## 2023-09-25 DIAGNOSIS — R29.898 DECREASED STRENGTH OF TRUNK AND BACK: Primary | ICD-10-CM

## 2023-09-25 PROCEDURE — 97110 THERAPEUTIC EXERCISES: CPT

## 2023-09-25 PROCEDURE — 97112 NEUROMUSCULAR REEDUCATION: CPT

## 2023-09-25 NOTE — PROGRESS NOTES
[facility]  Physical Therapy Treatment Note     Name: Radha BERNAL [facility] Number: [id number]    Therapy Diagnosis:   Encounter Diagnoses   Name Primary?    Decreased strength of trunk and back Yes    Alteration in mobility associated with pain          Physician: Mariya Cheema NP    Visit Date: 2023    Physician Orders: PT Eval and Treat  Medical Diagnosis from Referral:   M54.9 (ICD-10-CM) - Dorsalgia, unspecified    M47.817 (ICD-10-CM) - Spondylosis without myelopathy or radiculopathy, lumbosacral region    M79.18 (ICD-10-CM) - Myofascial pain  Evaluation Date: 2023  Authorization Period Expiration: 2023  Plan of Care Expiration: 2023  Reassessment Due: 2023  Visit # / Visits authorized:      Time In: 3:06  Time Out: 3:53  Total Billable Time: 48 minutes  INSURANCE and OUTCOMES: Value Based Insurance with FOTO Outcomes 1/3     Precautions: standard     Pattern of pain determined: 1 PEP    Subjective     Radha denies pain or discomfort in the back and reports being able to jog a mile without stopping    Patient reports tolerating previous visit without adverse effects.   Patient reports their pain to be 0/10 on a 0-10 scale with 0 being no pain and 10 being the worst pain imaginable.  Pain Location: [facility]     Occupation:  Does administrative desk work  Leisure:  Likes [employer] and [employer] movies    Pts goals: get stronger  Objective    Baseline Isometric Testing on Med X equipment: Testing administered by PT     Date of testin2023  ROM 0-54 deg   Max Peak Torque 85    Min Peak Torque 39   Flex/Ext Ratio 2.17   % below normative data 54      Midpoint Isometric Testing on Med X equipment: Testing administered by PT  Date of testin2023  ROM 0-60   Max Peak Torque 91   Min Peak Torque 33   Flex/Ext Ratio 2.77:1   % below normative data -46   % increase in strength 14%        Limitation/Restriction for FOTO Lumbar  "Survey     Therapist reviewed FOTO scores for Radha Tabor on 5/24/2023.   FOTO documents entered into [employer] - see Media section.     Visit 1 Score: 14%  Visit 5 Score: 5%  Visit 10 Score: 16%  Discharge Score:   Goal Score: 5%        Treatment     Radha received the treatments listed below:      Radha participated in neuromuscular re-education activities to improve balance, coordination, proprioception, motor control and/or posture for 10 minutes. The following activities were included:      Bird dog position row 7.5# KB 15x  Cat camel x10  NP - Dead lift with cue for hip hinge 15# 2x10  +Dead bug holding 10# weight  +Stir the pot 10x, CW/CCW on knees     Radha participated in therapeutic exercises to develop  for 33 minutes including:    Exercises in bold completed:    Treadmill 6'   Rocking madiha pose x10  1/2 kneeling open book 5  Extension in Lying x10   +Forearm plank 3x20"  +Side forearm plank 2x20"          9/25/2023     3:38 PM   [facility]   Visit Number 17   VAS Pain Rating 0   Lumbar Weight 55 lbs   Repetitions 18   Rating of Perceived Exertion 5   Ice - Z Lie (in min.) 0         Not performed 9/25/2023 :  Prone on elbows 1 min  Prone hip extension 2x10  Extension in standing x10  TrA isometric with ball x10  Side lying clams RTB x 10  Supine hip flexor stretch 1 min with strap  LTR with ball for Oblique activation   Bird dog  Bridge with kick 5x2 - NP  Pallof press x15 GTB stagger stance on foam  1/2 kneeling chop GTB x10 (outside and inside knee up) bilat  Bridge with kick 5x2     Completed Peripheral muscle strengthening which included one set of 15-20 repetitions at a slow and controlled 10-13 second per rep pace focused on strengthening supporting musculature in order to improve body mechanics and functional mobility. Patient and therapist focused on proper form during treatment to ensure optimal strengthening of each targeted muscle group.  Machines utilized include torso rotation, leg " "extension, leg curl, chest press, upright row. Tricep extension, bicep curl, leg press, and hip abduction added visit 3    Radha participated in dynamic functional therapeutic activities to improve functional performance and simulate household and community activities for 00  minutes. The following activities were included:    NP-Squat with unilateral lift 15# 5x2 bilat  NP-P[place] carry 15#    Radha received manual therapy techniques for  0 minutes. The following activities were included:    Pt given cold pack for 0 minutes to LB in Z-lie    Patient Education and Home Exercises     Home exercises include:    Extension in Lying  Cat camel  Bridges  Pallof press  Half kneeling chop    Cardio program (V5): -  Lifting education (V11): -  Posture/Lumbar roll: Has no yet obtained  [employer] Discharge handout (date given): -  Equipment at home/gym membership:     Education provided:   - PT role and POC  - HEP      Written Home Exercises Provided: Patient instructed to cont prior HEP.  Exercises were reviewed and Radha was able to demonstrate them prior to the end of the session.  Radha demonstrated  understanding of the education provided.     See EMR under  for exercises provided     Assessment     Radha presents to therapy with reports of feeling stronger and more aware of the things she needs to do to stay healthy. She is more active, discussed keeping up with the stretches as well as her cardio and strength exercises. Resumed stabilization exercises progressing static holds for side plank and adding "stir the pot" plank exercise in modified plank position. She was able to complete exercises with fatigue. Maintained resistance on the lumbar medx completing 18 repetitions at 5/10 RPE.    Patient is making good progress towards established goals.  Pt will continue to benefit from skilled outpatient physical therapy to address the deficits stated in the impairment chart, provide pt/family education and to maximize pt's " level of independence in the home and community environment.     Pt prognosis is Excellent    Anticipated Barriers for therapy: None  Pt's spiritual, cultural and educational needs considered and pt agreeable to plan of care and goals as stated below:     Goals:    Pt is in agreement with the following goals.     Short term goals:  6 weeks or 10 visits   - Pt will demonstrate increased lumbar ROM by at least 3 degrees from the initial ROM value with improvements noted in functional ROM and ability to perform ADLs. MET 8/17  - Pt will demonstrate increased MedX average isometric strength value by 15% from initial test resulting in improved ability to perform bending, lifting, and carrying activities safely, confidently. Partially progressed and Ongoing  - Pt will report a reduction in worst pain score by 1-2 points for improved tolerance for transitional movements. MET 8/17  - Pt able to perform HEP correctly with minimal cueing or supervision from therapist to encourage independent management of symptoms. MET 8/17     Long term goals: 10 weeks or 20 visits   - Pt will demonstrate increased lumbar ROM by at least 6 degrees from initial ROM value, resulting in improved ability to perform functional forward bending while standing and sitting. MET 8/17  - Pt will demonstrate increased MedX average isometric strength value by 30% from initial test resulting in improved ability to perform bending, lifting, and carrying activities safely and confidently. Appropriate and Ongoing  - Pt to demonstrate ability to independently control and reduce their pain through posture positioning and mechanical movements throughout a typical day. Appropriate and Ongoing  - Pt will demonstrate reduced pain and improved functional outcomes as reported on the FOTO by reaching a limitation score of < or = 5% or less in order to demonstrate subjective improvement in pt's condition.   Appropriate and Ongoing  - Pt will demonstrate independence  with the HEP at discharge. Appropriate and Ongoing  - Pt will be able to turn over in bed without increase in low back pain (patient goal) Appropriate and Ongoing  - Pt will be able to transfer from sit to stand without increase in pain (patient goal) Appropriate and Ongoing       Plan   Outpatient physical therapy 2x week for 10 weeks or 20 visits to include the following:   - Patient education  - Therapeutic exercise  - Manual therapy  - Performance testing   - Neuromuscular Re-education  - Therapeutic activity   - Modalities     Pt may be seen by PTA as part of the rehabilitation team.     Therapist: Gagan Oliver, PT  9/25/2023

## 2023-09-27 ENCOUNTER — CLINICAL SUPPORT (OUTPATIENT)
Dept: REHABILITATION | Facility: OTHER | Age: 48
End: 2023-09-27
Payer: COMMERCIAL

## 2023-09-27 DIAGNOSIS — R29.898 DECREASED STRENGTH OF TRUNK AND BACK: Primary | ICD-10-CM

## 2023-09-27 DIAGNOSIS — R52 ALTERATION IN MOBILITY ASSOCIATED WITH PAIN: ICD-10-CM

## 2023-09-27 DIAGNOSIS — Z78.9 ALTERATION IN MOBILITY ASSOCIATED WITH PAIN: ICD-10-CM

## 2023-09-27 PROCEDURE — 97110 THERAPEUTIC EXERCISES: CPT

## 2023-09-27 PROCEDURE — 97112 NEUROMUSCULAR REEDUCATION: CPT

## 2023-09-27 NOTE — PROGRESS NOTES
[facility]  Physical Therapy Treatment Note     Name: Radha BERNAL Tabor  [facility] Number: [id number]    Therapy Diagnosis:   Encounter Diagnoses   Name Primary?    Decreased strength of trunk and back Yes    Alteration in mobility associated with pain          Physician: Mariya Cheema NP    Visit Date: 2023    Physician Orders: PT Eval and Treat  Medical Diagnosis from Referral:   M54.9 (ICD-10-CM) - Dorsalgia, unspecified    M47.817 (ICD-10-CM) - Spondylosis without myelopathy or radiculopathy, lumbosacral region    M79.18 (ICD-10-CM) - Myofascial pain  Evaluation Date: 2023  Authorization Period Expiration: 2023  Plan of Care Expiration: 2023  Reassessment Due: 2023  Visit # / Visits authorized:      Time In: 11:40  Time Out: 12:30  Total Billable Time: 50 minutes  INSURANCE and OUTCOMES: Value Based Insurance with FOTO Outcomes 1/3     Precautions: standard     Pattern of pain determined: 1 PEP    Subjective     Radha reports feeling good was able to run 2 miles this morning    Patient reports tolerating previous visit without adverse effects.   Patient reports their pain to be 0/10 on a 0-10 scale with 0 being no pain and 10 being the worst pain imaginable.  Pain Location: [facility]     Occupation:  Does administrative desk work  Leisure:  Likes [employer] and [employer] movies    Pts goals: get stronger  Objective    Baseline Isometric Testing on Med X equipment: Testing administered by PT     Date of testin2023  ROM 0-54 deg   Max Peak Torque 85    Min Peak Torque 39   Flex/Ext Ratio 2.17   % below normative data 54      Midpoint Isometric Testing on Med X equipment: Testing administered by PT  Date of testin2023  ROM 0-60   Max Peak Torque 91   Min Peak Torque 33   Flex/Ext Ratio 2.77:1   % below normative data -46   % increase in strength 14%        Limitation/Restriction for FOTO Lumbar Survey     Therapist reviewed FOTO  scores for Radha Tabor on 5/24/2023.   FOTO documents entered into [employer] - see Media section.     Visit 1 Score: 14%  Visit 5 Score: 5%  Visit 10 Score: 16%  Discharge Score:   Goal Score: 5%        Treatment     Radha received the treatments listed below:      Radha participated in neuromuscular re-education activities to improve balance, coordination, proprioception, motor control and/or posture for 10 minutes. The following activities were included:      Bird dog position row 7.5# KB 15x  Cat camel x10  Dead bug holding 10# weight x15  Stir the pot 10x2, CW/CCW on knees  +Standing marching pull down x10  [place] carry 20# around the gym       Radha participated in therapeutic exercises to develop  for 33 minutes including:    Exercises in bold completed:    Treadmill 5'   Rocking madiha pose x10  1/2 kneeling open book 10  Extension in Lying x10             9/27/2023    12:13 PM   [facility]   Visit Number 18   VAS Pain Rating 0   Treadmill Time (in min.) 5 min   Extension in Lying 10   Lumbar Weight 55 lbs   Repetitions 20   Rating of Perceived Exertion 4           Not performed 9/27/2023 :  Prone on elbows 1 min  Prone hip extension 2x10  Extension in standing x10  TrA isometric with ball x10  Side lying clams RTB x 10  Supine hip flexor stretch 1 min with strap  LTR with ball for Oblique activation   Bird dog  Bridge with kick 5x2 - NP  Pallof press x15 GTB stagger stance on foam  1/2 kneeling chop GTB x10 (outside and inside knee up) bilat  Bridge with kick 5x2     Completed Peripheral muscle strengthening which included one set of 15-20 repetitions at a slow and controlled 10-13 second per rep pace focused on strengthening supporting musculature in order to improve body mechanics and functional mobility. Patient and therapist focused on proper form during treatment to ensure optimal strengthening of each targeted muscle group.  Machines utilized include torso rotation, leg extension, leg curl, chest  press, upright row. Tricep extension, bicep curl, leg press, and hip abduction added visit 3    Radha participated in dynamic functional therapeutic activities to improve functional performance and simulate household and community activities for 00  minutes. The following activities were included:    NP-Squat with unilateral lift 15# 5x2 bilat  NP-[place] carry 15#    Radha received manual therapy techniques for  0 minutes. The following activities were included:    Pt given cold pack for 0 minutes to LB in Z-lie    Patient Education and Home Exercises     Home exercises include:    Extension in Lying  Cat camel  Bridges  Pallof press  Half kneeling chop    Cardio program (V5): -  Lifting education (V11): -  Posture/Lumbar roll: Has no yet obtained  [employer] Discharge handout (date given): -  Equipment at home/gym membership:     Education provided:   - PT role and POC  - HEP      Written Home Exercises Provided: Patient instructed to cont prior HEP.  Exercises were reviewed and Radha was able to demonstrate them prior to the end of the session.  Radha demonstrated  understanding of the education provided.     See EMR under  for exercises provided     Assessment     Radha presents to therapy with reports being more active and able to do more without increase in low back pain.  Resumed stabilization exercises progressing standing marching with pull down and added back farmers carry. She was able to complete exercises with fatigue. Maintained resistance on the lumbar medx completing 20 repetitions at 4/10 RPE.    Patient is making good progress towards established goals.  Pt will continue to benefit from skilled outpatient physical therapy to address the deficits stated in the impairment chart, provide pt/family education and to maximize pt's level of independence in the home and community environment.     Pt prognosis is Excellent    Anticipated Barriers for therapy: None  Pt's spiritual, cultural and educational  needs considered and pt agreeable to plan of care and goals as stated below:     Goals:    Pt is in agreement with the following goals.     Short term goals:  6 weeks or 10 visits   - Pt will demonstrate increased lumbar ROM by at least 3 degrees from the initial ROM value with improvements noted in functional ROM and ability to perform ADLs. MET 8/17  - Pt will demonstrate increased MedX average isometric strength value by 15% from initial test resulting in improved ability to perform bending, lifting, and carrying activities safely, confidently. Partially progressed and Ongoing  - Pt will report a reduction in worst pain score by 1-2 points for improved tolerance for transitional movements. MET 8/17  - Pt able to perform HEP correctly with minimal cueing or supervision from therapist to encourage independent management of symptoms. MET 8/17     Long term goals: 10 weeks or 20 visits   - Pt will demonstrate increased lumbar ROM by at least 6 degrees from initial ROM value, resulting in improved ability to perform functional forward bending while standing and sitting. MET 8/17  - Pt will demonstrate increased MedX average isometric strength value by 30% from initial test resulting in improved ability to perform bending, lifting, and carrying activities safely and confidently. Appropriate and Ongoing  - Pt to demonstrate ability to independently control and reduce their pain through posture positioning and mechanical movements throughout a typical day. Appropriate and Ongoing  - Pt will demonstrate reduced pain and improved functional outcomes as reported on the FOTO by reaching a limitation score of < or = 5% or less in order to demonstrate subjective improvement in pt's condition.   Appropriate and Ongoing  - Pt will demonstrate independence with the HEP at discharge. Appropriate and Ongoing  - Pt will be able to turn over in bed without increase in low back pain (patient goal) Appropriate and Ongoing  - Pt will be  able to transfer from sit to stand without increase in pain (patient goal) Appropriate and Ongoing       Plan   Outpatient physical therapy 2x week for 10 weeks or 20 visits to include the following:   - Patient education  - Therapeutic exercise  - Manual therapy  - Performance testing   - Neuromuscular Re-education  - Therapeutic activity   - Modalities     Pt may be seen by PTA as part of the rehabilitation team.     Therapist: Gagan Oliver, PT  9/27/2023

## 2023-10-12 NOTE — PROGRESS NOTES
Outpatient Therapy Updated Plan of Care     Visit Date: 10/13/2023    Name: Radha BERNAL [facility] Number: [id number]    Therapy Diagnosis: No diagnosis found.  Physician: Mariya Cheema, NP    Physician Orders: PT Eval and Treat  Medical Diagnosis from Referral:   M54.9 (ICD-10-CM) - Dorsalgia, unspecified    M47.817 (ICD-10-CM) - Spondylosis without myelopathy or radiculopathy, lumbosacral region    M79.18 (ICD-10-CM) - Myofascial pain  Evaluation Date: 2023  Authorization Period Expiration: 2023  Plan of Care Expiration: 2023  Reassessment Due: 2023  Visit # / Visits authorized:     INSURANCE and OUTCOMES: Value Based Insurance with FOTO Outcomes 1/3     Precautions: standard     Pattern of pain determined: 1 PEP    Subjective     Radha reports feeling good was able to run 2 miles this morning     Patient reports tolerating previous visit without adverse effects.   Patient reports their pain to be 0/10 on a 0-10 scale with 0 being no pain and 10 being the worst pain imaginable.  Pain Location: [facility]     Occupation:  Does administrative desk work  Leisure:  Likes [employer] and [employer] movies     Pts goals: get stronger    Objective     MOVEMENT LOSS - Lumbar    Norms ROM Loss Initial   Flexion Fingers touch toes, sacral angle >/= 70 deg, uniform spinal curvature, posterior weight shift  ***  moderate loss   Extension ASIS surpasses toes, spine of scapulae surpasses heels, uniform spinal curve ***  within functional limits   Side glide Right   within functional limits   Side glide Left   within functional limits   Rotation Right PT observes contralateral shoulder within functional limits   Rotation Left PT observes contralateral shoulder within functional limits       Baseline Isometric Testing on Med X equipment: Testing administered by PT     Date of testin2023  ROM 0-54 deg   Max Peak Torque 85    Min Peak Torque 39   Flex/Ext Ratio 2.17   % below normative data 54       Midpoint Isometric Testing on Med X equipment: Testing administered by PT  Date of testin2023  ROM 0-60   Max Peak Torque 91   Min Peak Torque 33   Flex/Ext Ratio 2.77:1   % below normative data -46   % increase in strength 14%         Limitation/Restriction for FOTO Lumbar Survey     Therapist reviewed FOTO scores for Radha Tabor on 2023.   FOTO documents entered into [employer] - see Media section.     Visit 1 Score: 14%  Visit 5 Score: 5%  Visit 10 Score: 16%  Discharge Score:   Goal Score: 5%     Treatment      Radha received the treatments listed below:       Radha participated in neuromuscular re-education activities to improve balance, coordination, proprioception, motor control and/or posture for *** minutes. The following activities were included:        Bird dog position row 7.5# KB 15x  Cat camel x10  Dead bug holding 10# weight x15  Stir the pot 10x2, CW/CCW on knees  +Standing marching pull down x10  [place] carry 20# around the gym     Radha participated in therapeutic exercises to develop  for *** minutes including:     Exercises in bold completed:     Treadmill 5'   Rocking madiha pose x10  1/2 kneeling open book 10  Extension in Lying x10      ***     Not performed 2023 :  Prone on elbows 1 min  Prone hip extension 2x10  Extension in standing x10  TrA isometric with ball x10  Side lying clams RTB x 10  Supine hip flexor stretch 1 min with strap  LTR with ball for Oblique activation   Bird dog  Bridge with kick 5x2 - NP  Pallof press x15 GTB stagger stance on foam  1/2 kneeling chop GTB x10 (outside and inside knee up) bilat  Bridge with kick 5x2      Completed Peripheral muscle strengthening which included one set of 15-20 repetitions at a slow and controlled 10-13 second per rep pace focused on strengthening supporting musculature in order to improve body mechanics and functional mobility. Patient and therapist focused on proper form during treatment to ensure optimal strengthening of  each targeted muscle group.  Machines utilized include torso rotation, leg extension, leg curl, chest press, upright row. Tricep extension, bicep curl, leg press, and hip abduction added visit 3     Radha participated in dynamic functional therapeutic activities to improve functional performance and simulate household and community activities for ***minutes. The following activities were included:     NP-Squat with unilateral lift 15# 5x2 bilat  NP-[place] carry 15#     Radha received manual therapy techniques for  0 minutes. The following activities were included:     Pt given cold pack for *** minutes to LB in Z-lie     Patient Education and Home Exercises      Home exercises include:     Extension in Lying  Cat camel  Bridges  Pallof press  Half kneeling chop     Cardio program (V5): -  Lifting education (V11): -  Posture/Lumbar roll: Has no yet obtained  [employer] Discharge handout (date given): -  Equipment at home/gym membership:      Education provided:   - PT role and POC  - HEP        Written Home Exercises Provided: Patient instructed to cont prior HEP.  Exercises were reviewed and Radha was able to demonstrate them prior to the end of the session.  Radha demonstrated  understanding of the education provided.      See EMR under  for exercises provided      Assessment     Update: ***    Long Term Goal Status:   {DESC LONG TERM GOAL:15629}  Reasons for Recertification of Therapy:  In order to complete [facility] program to completion per patient's necessity.    Plan     Updated Certification Period: 10/13/2023 to 12/13/2023  Recommended Treatment Plan: 1-2 times per week for 4 weeks: Cervical/Lumbar Traction, Manual Therapy, Neuromuscular Re-ed, Patient Education, Therapeutic Activities, and Therapeutic Exercise  Other Recommendations: ***    Outpatient physical therapy 2x week for 13 weeks or 20 visits to include the following:   - Patient education  - Therapeutic exercise  - Manual therapy  - Performance  "testing   - Neuromuscular Re-education  - Therapeutic activity   - Modalities    Pt may be seen by PTA as part of the rehabilitation team.     Therapist: Tamiko Garcia, PT  10/12/2023    "I certify the need for these services furnished under this plan of treatment and while under my care."    ____________________________________  Physician/Referring Practitioner    _______________  Date of Signature    Tamiko Garcia, PT  10/13/2023      I CERTIFY THE NEED FOR THESE SERVICES FURNISHED UNDER THIS PLAN OF TREATMENT AND WHILE UNDER MY CARE    Physician's comments:        Physician's Signature: ___________________________________________________     "

## 2023-10-13 ENCOUNTER — CLINICAL SUPPORT (OUTPATIENT)
Dept: REHABILITATION | Facility: OTHER | Age: 48
End: 2023-10-13
Payer: COMMERCIAL

## 2023-10-13 DIAGNOSIS — R52 ALTERATION IN MOBILITY ASSOCIATED WITH PAIN: ICD-10-CM

## 2023-10-13 DIAGNOSIS — R29.898 DECREASED STRENGTH OF TRUNK AND BACK: Primary | ICD-10-CM

## 2023-10-13 DIAGNOSIS — Z78.9 ALTERATION IN MOBILITY ASSOCIATED WITH PAIN: ICD-10-CM

## 2023-10-13 PROCEDURE — 97112 NEUROMUSCULAR REEDUCATION: CPT

## 2023-10-13 PROCEDURE — 97110 THERAPEUTIC EXERCISES: CPT

## 2023-10-13 NOTE — PROGRESS NOTES
[facility]  Physical Therapy Treatment Note     Name: Radha BERNAL [facility] Number: [id number]    Therapy Diagnosis:   Encounter Diagnoses   Name Primary?    Decreased strength of trunk and back Yes    Alteration in mobility associated with pain          Physician: Mariya Cheema NP    Visit Date: 10/13/2023    Physician Orders: PT Eval and Treat  Medical Diagnosis from Referral:   M54.9 (ICD-10-CM) - Dorsalgia, unspecified    M47.817 (ICD-10-CM) - Spondylosis without myelopathy or radiculopathy, lumbosacral region    M79.18 (ICD-10-CM) - Myofascial pain  Evaluation Date: 2023  Authorization Period Expiration: 2023  Plan of Care Expiration: 10/30/2023  Reassessment Due: 10/30/2023  Visit # / Visits authorized:      Time In: 09:40  Time Out: 10:30  Total Billable Time: 50 minutes  INSURANCE and OUTCOMES: Value Based Insurance with FOTO Outcomes 1/3     Precautions: standard     Pattern of pain determined: 1 PEP    Subjective     Radha reports she is feeling tired but the back is feeling good    Patient reports tolerating previous visit without adverse effects.   Patient reports their pain to be 0/10 on a 0-10 scale with 0 being no pain and 10 being the worst pain imaginable.  Pain Location: [facility]     Occupation:  Does administrative desk work  Leisure:  Likes [employer] and [employer]    Pts goals: get stronger  Objective    Baseline Isometric Testing on Med X equipment: Testing administered by PT     Date of testin2023  ROM 0-54 deg   Max Peak Torque 85    Min Peak Torque 39   Flex/Ext Ratio 2.17   % below normative data 54      Midpoint Isometric Testing on Med X equipment: Testing administered by PT  Date of testin2023  ROM 0-60   Max Peak Torque 91   Min Peak Torque 33   Flex/Ext Ratio 2.77:1   % below normative data -46   % increase in strength 14%        Limitation/Restriction for FOTO Lumbar Survey     Therapist reviewed  FOTO scores for Radha Tabor on 5/24/2023.   FOTO documents entered into [facility] - see Media section.     Visit 1 Score: 14%  Visit 5 Score: 5%  Visit 10 Score: 16%  Discharge Score:   Goal Score: 5%        Treatment     Radha received the treatments listed below:      Radha participated in neuromuscular re-education activities to improve balance, coordination, proprioception, motor control and/or posture for 20  minutes. The following activities were included:      Bird dog  Bridge x10  Bridge with kick 5x2   Cat camel x10  Dead bug holding 10# weight x15      Not performed 10/13/2023 :    Stir the pot 10x2, CW/CCW on knees  Standing marching pull down x10  [place] carry 20# around the gym       Radha participated in therapeutic exercises to develop  for 33 minutes including:    Exercises in bold completed:    Rocking madiha pose x10  1/2 kneeling open book 5  Extension in Lying x10         10/13/2023    11:17 AM   [facility]   Visit Number 19   VAS Pain Rating 0   Extension in Lying 10   Lumbar Weight 58 lbs   Repetitions 15   Rating of Perceived Exertion 3   Ice - Z Lie (in min.) 0         Not performed 10/13/2023 :  Prone on elbows 1 min  Prone hip extension 2x10  Extension in standing x10  TrA isometric with ball x10  Side lying clams RTB x 10  Supine hip flexor stretch 1 min with strap  LTR with ball for Oblique activation   Bird dog  Bridge with kick 5x2 - NP  Pallof press x15 GTB stagger stance on foam  1/2 kneeling chop GTB x10 (outside and inside knee up) bilat  Bridge with kick 5x2     Completed Peripheral muscle strengthening which included one set of 15-20 repetitions at a slow and controlled 10-13 second per rep pace focused on strengthening supporting musculature in order to improve body mechanics and functional mobility. Patient and therapist focused on proper form during treatment to ensure optimal strengthening of each targeted muscle group.  Machines utilized include torso rotation, leg  extension, leg curl, chest press, upright row. Tricep extension, bicep curl, leg press, and hip abduction added visit 3    Radha participated in dynamic functional therapeutic activities to improve functional performance and simulate household and community activities for 00  minutes. The following activities were included:    NP-Squat with unilateral lift 15# 5x2 bilat  NP-[place] carry 15#    Radha received manual therapy techniques for  0 minutes. The following activities were included:    Pt given cold pack for 0 minutes to LB in Z-lie    Patient Education and Home Exercises     Home exercises include:    Extension in Lying  Cat camel  Bridges  Pallof press  Half kneeling chop    Cardio program (V5): -  Lifting education (V11): -  Posture/Lumbar roll: Has no yet obtained  [employer] Discharge handout (date given): -  Equipment at home/gym membership:     Education provided:   - PT role and POC  - HEP      Written Home Exercises Provided: Patient instructed to cont prior HEP.  Exercises were reviewed and Radha was able to demonstrate them prior to the end of the session.  Radha demonstrated  understanding of the education provided.     See EMR under  for exercises provided     Assessment     Radha presents to therapy with some general fatigue but the back is feeling good overall, she is feeling stronger and is more active. Completed exercise review of exercises and stretches she is able to complete at home and what consistency. Radha has good recall of exercises.  Increased resistance on the lumbar medx completing 15 repetitions at 3/10 RPE, plan to d/c next visit    Patient is making good progress towards established goals.  Pt will continue to benefit from skilled outpatient physical therapy to address the deficits stated in the impairment chart, provide pt/family education and to maximize pt's level of independence in the home and community environment.     Pt prognosis is Excellent    Anticipated Barriers for  therapy: None  Pt's spiritual, cultural and educational needs considered and pt agreeable to plan of care and goals as stated below:     Goals:    Pt is in agreement with the following goals.     Short term goals:  6 weeks or 10 visits   - Pt will demonstrate increased lumbar ROM by at least 3 degrees from the initial ROM value with improvements noted in functional ROM and ability to perform ADLs. MET 8/17  - Pt will demonstrate increased MedX average isometric strength value by 15% from initial test resulting in improved ability to perform bending, lifting, and carrying activities safely, confidently. Partially progressed and Ongoing  - Pt will report a reduction in worst pain score by 1-2 points for improved tolerance for transitional movements. MET 8/17  - Pt able to perform HEP correctly with minimal cueing or supervision from therapist to encourage independent management of symptoms. MET 8/17     Long term goals: 10 weeks or 20 visits   - Pt will demonstrate increased lumbar ROM by at least 6 degrees from initial ROM value, resulting in improved ability to perform functional forward bending while standing and sitting. MET 8/17  - Pt will demonstrate increased MedX average isometric strength value by 30% from initial test resulting in improved ability to perform bending, lifting, and carrying activities safely and confidently. Appropriate and Ongoing  - Pt to demonstrate ability to independently control and reduce their pain through posture positioning and mechanical movements throughout a typical day. Appropriate and Ongoing  - Pt will demonstrate reduced pain and improved functional outcomes as reported on the FOTO by reaching a limitation score of < or = 5% or less in order to demonstrate subjective improvement in pt's condition.   Appropriate and Ongoing  - Pt will demonstrate independence with the HEP at discharge. Appropriate and Ongoing  - Pt will be able to turn over in bed without increase in low back  pain (patient goal) Appropriate and Ongoing  - Pt will be able to transfer from sit to stand without increase in pain (patient goal) Appropriate and Ongoing       Plan   Outpatient physical therapy 2x week for 10 weeks or 20 visits to include the following:   - Patient education  - Therapeutic exercise  - Manual therapy  - Performance testing   - Neuromuscular Re-education  - Therapeutic activity   - Modalities     Pt may be seen by PTA as part of the rehabilitation team.     Therapist: Gagan Oliver, PT  10/13/2023

## 2023-10-13 NOTE — PATIENT INSTRUCTIONS
"HEALTHY BACK TOOLS        KEEP YOUR SPINE FEELING FINE   HEALTHY HABITS   Do your "GO TO" stretches 2/day   Get a good night's REST   Watch your POSTURE in sitting/standing Drink PLENTY of water   Use a lumbar roll Eat LOTS of fruits & vegetables   GET UP often (walk and/or stretch) Manage your STRESS   Make your workplace IDEAL FOR YOU  Don't smoke   Lift correctly EXERCISE   Practice positive self talk-talk to yourself kindly like you would your best friend                         WHAT TO DO WHEN SYMPTOMS FLARE UP  Back and neck pain may occasionally flare up.  If you experience a flare   up, remember your tools. Be encouraged, by remembering that flare-ups will   usually pass.   My Tools:    ~Use your "Go To" Stretches/Positions   ~Keep Moving-pain usually gets better if you move  ~Z lie (with or without ice)  10 min several times a day until symptoms reduce  ~Slowly resume normal activities   ~Practice Deep Breathing and Relaxation techniques                                                 MY EXERCISE PLAN    "

## 2023-10-16 ENCOUNTER — CLINICAL SUPPORT (OUTPATIENT)
Dept: REHABILITATION | Facility: OTHER | Age: 48
End: 2023-10-16
Payer: COMMERCIAL

## 2023-10-16 DIAGNOSIS — Z78.9 ALTERATION IN MOBILITY ASSOCIATED WITH PAIN: ICD-10-CM

## 2023-10-16 DIAGNOSIS — R29.898 DECREASED STRENGTH OF TRUNK AND BACK: Primary | ICD-10-CM

## 2023-10-16 DIAGNOSIS — R52 ALTERATION IN MOBILITY ASSOCIATED WITH PAIN: ICD-10-CM

## 2023-10-16 PROCEDURE — 97110 THERAPEUTIC EXERCISES: CPT

## 2023-10-16 PROCEDURE — 97112 NEUROMUSCULAR REEDUCATION: CPT

## 2023-10-16 NOTE — PROGRESS NOTES
[facility]  Physical Therapy Treatment Note     Name: Radha BERNAL [facility] Number: [id number]    Therapy Diagnosis:   Encounter Diagnoses   Name Primary?    Decreased strength of trunk and back Yes    Alteration in mobility associated with pain             Physician: Mariya Cheema NP    Visit Date: 10/16/2023    Physician Orders: PT Eval and Treat  Medical Diagnosis from Referral:   M54.9 (ICD-10-CM) - Dorsalgia, unspecified    M47.817 (ICD-10-CM) - Spondylosis without myelopathy or radiculopathy, lumbosacral region    M79.18 (ICD-10-CM) - Myofascial pain  Evaluation Date: 2023  Authorization Period Expiration: 2023  Plan of Care Expiration: 10/30/2023  Reassessment Due: 10/30/2023  Visit # / Visits authorized:      Time In: 3:10  Time Out: 3:00  Total Billable Time: 50 minutes  INSURANCE and OUTCOMES: Value Based Insurance with FOTO Outcomes 1/3     Precautions: standard     Pattern of pain determined: 1 PEP    Subjective     Radha reports she is feeling tired but the back is feeling good    Patient reports tolerating previous visit without adverse effects.   Patient reports their pain to be 0/10 on a 0-10 scale with 0 being no pain and 10 being the worst pain imaginable.  Pain Location: [facility]     Occupation:  Does administrative desk work  Leisure:  Likes [employer] and [employer]    Pts goals: get stronger  Objective    Baseline Isometric Testing on Med X equipment: Testing administered by PT     Date of testin2023  ROM 0-54 deg   Max Peak Torque 85    Min Peak Torque 39   Flex/Ext Ratio 2.17   % below normative data 54      Midpoint Isometric Testing on Med X equipment: Testing administered by PT  Date of testin2023  ROM 0-60   Max Peak Torque 91   Min Peak Torque 33   Flex/Ext Ratio 2.77:1   % below normative data -46   % increase in strength 14%      Final Isometric Testing on Med X equipment: Testing administered by  PT  Date of testing: 10/16/2023  ROM 0-60   Max Peak Torque 109   Min Peak Torque 42   Flex/Ext Ratio 2.5:1   % below normative data 37   % increase in strength 33       Limitation/Restriction for FOTO Lumbar Survey     Therapist reviewed FOTO scores for Radha Tabor on 5/24/2023.   FOTO documents entered into [employer] - see Media section.     Visit 1 Score: 14%  Visit 5 Score: 5%  Visit 10 Score: 16%  Discharge Score:   Goal Score: 5%        Treatment     Radha received the treatments listed below:      Radha participated in neuromuscular re-education activities to improve balance, coordination, proprioception, motor control and/or posture for 25  minutes. The following activities were included:      Bird dog  Bridge x10  Bridge with kick 5x2   Cat camel x10  Dead bug holding 10# weight x15  Stir the pot 10x2, CW/CCW on knees  Standing marching pull down x10  [place] carry 20# around the gym       Radha participated in therapeutic exercises to develop  for 33 minutes including:    Exercises in bold completed:    Rocking madiha pose x10  1/2 kneeling open book 5  Extension in Lying x10         10/16/2023     3:53 PM   [facility]   Visit Number 20   VAS Pain Rating 0   Extension in Lying 10   Lumbar Flexion 60   Lumbar Extension 0   Lumbar Peak Torque 109 ft. lbs.   Min Torque 42   Test Percent Below Normative Data 37 %   Test Percent Gain in Strength from Initial  33 %   Ice - Z Lie (in min.) 0           Not performed 10/16/2023 :  Prone on elbows 1 min  Prone hip extension 2x10  Extension in standing x10  TrA isometric with ball x10  Side lying clams RTB x 10  Supine hip flexor stretch 1 min with strap  LTR with ball for Oblique activation   Bird dog  Bridge with kick 5x2 - NP  Pallof press x15 GTB stagger stance on foam  1/2 kneeling chop GTB x10 (outside and inside knee up) bilat  Bridge with kick 5x2     Completed Peripheral muscle strengthening which included one set of 15-20 repetitions at a slow and  controlled 10-13 second per rep pace focused on strengthening supporting musculature in order to improve body mechanics and functional mobility. Patient and therapist focused on proper form during treatment to ensure optimal strengthening of each targeted muscle group.  Machines utilized include torso rotation, leg extension, leg curl, chest press, upright row. Tricep extension, bicep curl, leg press, and hip abduction added visit 3    Radha participated in dynamic functional therapeutic activities to improve functional performance and simulate household and community activities for 00  minutes. The following activities were included:    NP-Squat with unilateral lift 15# 5x2 bilat  NP-[place] carry 15#    Radha received manual therapy techniques for  0 minutes. The following activities were included:    Pt given cold pack for 0 minutes to LB in Z-lie    Patient Education and Home Exercises     Home exercises include:    Extension in Lying  Cat camel  Bridges  Pallof press  Half kneeling chop    Cardio program (V5): -  Lifting education (V11): -  Posture/Lumbar roll: Has no yet obtained  [employer] Discharge handout (date given): -  Equipment at home/gym membership:     Education provided:   - PT role and POC  - HEP      Written Home Exercises Provided: Patient instructed to cont prior HEP.  Exercises were reviewed and Radha was able to demonstrate them prior to the end of the session.  Radha demonstrated  understanding of the education provided.     See EMR under  for exercises provided     Assessment     Patient has attended 20 visits of the [facility] program focusing aerobic training, isometric testing with dynamic strengthening on MedX machine for spine, whole body strengthening on peripheral strengthening equipment, HEP, and patient education. Patient has completed the Healthy Back Program and is ready to be transitioned into wellness program. Educated on the importance of wellness program and attending weekly  in order to maintain strength. Stressed the importance of continuing exercise and maintaining proper body mechanics and ergonomics in order to fully participate in activities of daily living, work, and leisure activities. At this time, patient demonstrates improvement in ability to reduce symptoms, improved posture, improved spinal ROM and improved strength. Discharge handout with HEP given and reviewed all information given. Patient able to demonstrate and verbalize understanding. Patient does not plan to attend wellness and is appropriate for transition.     -Improved posture and is using lumbar roll.  -Improved lumbar ROM, initially on MedX test 0-54 and currently 0-60.  -Improved strength at each test point on lumbar med ex IM test with 33% average improvement with reduced pain noted by patient.  -Initial outcome tool score 14 and current outcome tool score 2 indicating reduced pain and improved function.      Pt prognosis is Excellent    Anticipated Barriers for therapy: None  Pt's spiritual, cultural and educational needs considered and pt agreeable to plan of care and goals as stated below:     Goals:    Pt is in agreement with the following goals.     Short term goals:  6 weeks or 10 visits   - Pt will demonstrate increased lumbar ROM by at least 3 degrees from the initial ROM value with improvements noted in functional ROM and ability to perform ADLs. MET 8/17  - Pt will demonstrate increased MedX average isometric strength value by 15% from initial test resulting in improved ability to perform bending, lifting, and carrying activities safely, confidently. Met 10/16/2023  - Pt will report a reduction in worst pain score by 1-2 points for improved tolerance for transitional movements. MET 8/17  - Pt able to perform HEP correctly with minimal cueing or supervision from therapist to encourage independent management of symptoms. MET 8/17     Long term goals: 10 weeks or 20 visits   - Pt will demonstrate  increased lumbar ROM by at least 6 degrees from initial ROM value, resulting in improved ability to perform functional forward bending while standing and sitting. MET 8/17  - Pt will demonstrate increased MedX average isometric strength value by 30% from initial test resulting in improved ability to perform bending, lifting, and carrying activities safely and confidently. Met 10/16/2023  - Pt to demonstrate ability to independently control and reduce their pain through posture positioning and mechanical movements throughout a typical day. Met 10/16/2023  - Pt will demonstrate reduced pain and improved functional outcomes as reported on the FOTO by reaching a limitation score of < or = 5% or less in order to demonstrate subjective improvement in pt's condition.   Met 10/16/2023  - Pt will demonstrate independence with the HEP at discharge. Met 10/16/2023  - Pt will be able to turn over in bed without increase in low back pain (patient goal) Met 10/16/2023  - Pt will be able to transfer from sit to stand without increase in pain (patient goal) Met 10/16/2023       Plan     D/C to HEP    Therapist: Gagan Oliver, PT  10/16/2023

## 2023-10-18 ENCOUNTER — TELEPHONE (OUTPATIENT)
Dept: REHABILITATION | Facility: OTHER | Age: 48
End: 2023-10-18
Payer: COMMERCIAL

## 2023-10-23 ENCOUNTER — OFFICE VISIT (OUTPATIENT)
Dept: INTERNAL MEDICINE | Facility: CLINIC | Age: 48
End: 2023-10-23
Payer: COMMERCIAL

## 2023-10-23 DIAGNOSIS — J06.9 VIRAL URI: Primary | ICD-10-CM

## 2023-10-23 PROCEDURE — 99214 PR OFFICE/OUTPT VISIT, EST, LEVL IV, 30-39 MIN: ICD-10-PCS | Mod: 95,,, | Performed by: PHYSICIAN ASSISTANT

## 2023-10-23 PROCEDURE — 1159F MED LIST DOCD IN RCRD: CPT | Mod: CPTII,95,, | Performed by: PHYSICIAN ASSISTANT

## 2023-10-23 PROCEDURE — 99214 OFFICE O/P EST MOD 30 MIN: CPT | Mod: 95,,, | Performed by: PHYSICIAN ASSISTANT

## 2023-10-23 PROCEDURE — 1159F PR MEDICATION LIST DOCUMENTED IN MEDICAL RECORD: ICD-10-PCS | Mod: CPTII,95,, | Performed by: PHYSICIAN ASSISTANT

## 2023-10-23 RX ORDER — IBUPROFEN 600 MG/1
600 TABLET ORAL 3 TIMES DAILY
Qty: 20 TABLET | Refills: 0 | Status: SHIPPED | OUTPATIENT
Start: 2023-10-23

## 2023-10-23 RX ORDER — BENZONATATE 100 MG/1
100 CAPSULE ORAL 3 TIMES DAILY PRN
Qty: 30 CAPSULE | Refills: 0 | Status: SHIPPED | OUTPATIENT
Start: 2023-10-23 | End: 2023-11-22

## 2023-10-23 NOTE — PROGRESS NOTES
INTERNAL MEDICINE URGENT VISIT NOTE    CHIEF COMPLAINT     Chief Complaint   Patient presents with    Sinusitis       HPI     Radha Tabor is a 48 y.o. female who presents for an urgent visit today.    PCP is Landon Felix MD, patient is new to me.     Covid test negative   Congestion yellow and green mucous -from nose, mild productive cough in the AMs only.   Symptoms started Thursday   No fever  Used Affrin and saline rinse and mucinex   No known sick contacts  No recent travel  Not using any sx mgmt meds at home    Pt is at home during this VV  Face Time is 15 mins     Past Medical History:  Past Medical History:   Diagnosis Date    Acute hepatitis C without mention of hepatic coma(070.51)     Endometriosis, site unspecified     Viral hepatitis C 6/18/2016    Viral hepatitis C 6/18/2016       Home Medications:  Prior to Admission medications    Medication Sig Start Date End Date Taking? Authorizing Provider   azelastine (ASTELIN) 137 mcg (0.1 %) nasal spray 2 sprays (274 mcg total) by Nasal route 2 (two) times daily. 4/11/23   Radha Morales PA-C   fexofenadine (ALLEGRA) 180 MG tablet Take 1 tablet (180 mg total) by mouth once daily. 4/11/23   Radha Morales PA-C   fluticasone propionate (FLONASE) 50 mcg/actuation nasal spray 2 sprays (100 mcg total) by Each Nostril route 2 (two) times a day. 7/25/23   Radha Morales PA-C   ipratropium (ATROVENT) 21 mcg (0.03 %) nasal spray 2 sprays by Each Nostril route 2 (two) times daily as needed for Rhinitis. 4/11/23   Radha Morales PA-C   meloxicam (MOBIC) 15 MG tablet TAKE 1 TABLET(15 MG) BY MOUTH EVERY DAY  Patient not taking: Reported on 4/11/2023 9/15/21   Manan Nguyen,    mupirocin (BACTROBAN) 2 % ointment Apply topically 3 (three) times daily. 4/11/23   Radha Morales PA-C   olopatadine (PATANOL) 0.1 % ophthalmic solution Place 1 drop into both eyes 2 (two) times daily as needed for Allergies (itchy watery eyes). 4/11/23 4/10/24  Radha Morales, RADHA    valACYclovir (VALTREX) 1000 MG tablet Take 1 tablet (1,000 mg total) by mouth every 12 (twelve) hours. for 10 days 12/6/21 12/16/21  Staci Saini NP       Review of Systems:  Review of Systems   Constitutional:  Negative for activity change, chills, fever and unexpected weight change.   HENT:  Positive for rhinorrhea. Negative for hearing loss, sore throat and trouble swallowing.    Eyes:  Negative for discharge and visual disturbance.   Respiratory:  Negative for cough, chest tightness, shortness of breath and wheezing.    Cardiovascular:  Negative for chest pain and palpitations.   Gastrointestinal:  Negative for abdominal pain, blood in stool, constipation, diarrhea, nausea and vomiting.   Endocrine: Negative for polydipsia and polyuria.   Genitourinary:  Negative for difficulty urinating, dysuria, flank pain, hematuria and menstrual problem.   Musculoskeletal:  Negative for arthralgias, back pain, joint swelling, neck pain and neck stiffness.   Skin:  Negative for rash.   Neurological:  Positive for headaches. Negative for dizziness, syncope and weakness.   Psychiatric/Behavioral:  Negative for confusion and dysphoric mood.        Health Maintainence:   Immunizations:  Health Maintenance         Date Due Completion Date    Pneumococcal Vaccines (Age 0-64) (1 - PCV) Never done ---    TETANUS VACCINE Never done ---    Hemoglobin A1c (Diabetic Prevention Screening) 08/01/2018 8/1/2015    Influenza Vaccine (1) 09/01/2023 10/22/2021    COVID-19 Vaccine (4 - 2023-24 season) 09/01/2023 10/24/2021    Mammogram 05/04/2024 5/4/2023    Colorectal Cancer Screening 01/17/2025 1/17/2022    Cervical Cancer Screening 12/06/2026 12/6/2021    Lipid Panel 01/20/2027 1/20/2022             PHYSICAL EXAM     LMP 10/19/2023 (Approximate)  VV    Physical Exam  Constitutional:       Comments: Healthy appearing female in NAD or apparent pain. She makes good eye contact, speaks in clear full sentences and does not ambulate on  this exam. No coughing or nasal congestion on exam. Appears to be feeling well. Smiles and laughs on exam.            LABS     Lab Results   Component Value Date    HGBA1C 4.8 08/01/2015     CMP  Sodium   Date Value Ref Range Status   06/20/2020 137 136 - 145 mmol/L Final     Potassium   Date Value Ref Range Status   06/20/2020 3.9 3.5 - 5.1 mmol/L Final     Chloride   Date Value Ref Range Status   06/20/2020 107 95 - 110 mmol/L Final     CO2   Date Value Ref Range Status   06/20/2020 22 (L) 23 - 29 mmol/L Final     Glucose   Date Value Ref Range Status   06/20/2020 82 70 - 110 mg/dL Final     BUN   Date Value Ref Range Status   06/20/2020 9 6 - 20 mg/dL Final     Creatinine   Date Value Ref Range Status   06/20/2020 0.8 0.5 - 1.4 mg/dL Final     Calcium   Date Value Ref Range Status   06/20/2020 9.1 8.7 - 10.5 mg/dL Final     Total Protein   Date Value Ref Range Status   09/25/2019 7.7 6.0 - 8.4 g/dL Final     Albumin   Date Value Ref Range Status   09/25/2019 3.9 3.5 - 5.2 g/dL Final     Total Bilirubin   Date Value Ref Range Status   09/25/2019 0.7 0.1 - 1.0 mg/dL Final     Comment:     For infants and newborns, interpretation of results should be based  on gestational age, weight and in agreement with clinical  observations.  Premature Infant recommended reference ranges:  Up to 24 hours.............<8.0 mg/dL  Up to 48 hours............<12.0 mg/dL  3-5 days..................<15.0 mg/dL  6-29 days.................<15.0 mg/dL       Alkaline Phosphatase   Date Value Ref Range Status   09/25/2019 41 (L) 55 - 135 U/L Final     AST   Date Value Ref Range Status   09/25/2019 39 10 - 40 U/L Final     ALT   Date Value Ref Range Status   09/25/2019 37 10 - 44 U/L Final     Anion Gap   Date Value Ref Range Status   06/20/2020 8 8 - 16 mmol/L Final     eGFR if    Date Value Ref Range Status   06/20/2020 >60 >60 mL/min/1.73 m^2 Final     eGFR if non    Date Value Ref Range Status   06/20/2020  >60 >60 mL/min/1.73 m^2 Final     Comment:     Calculation used to obtain the estimated glomerular filtration  rate (eGFR) is the CKD-EPI equation.        Lab Results   Component Value Date    WBC 4.41 01/04/2022    HGB 12.1 01/04/2022    HCT 37.1 01/04/2022    MCV 90 01/04/2022     01/04/2022     Lab Results   Component Value Date    CHOL 200 (H) 09/25/2019    CHOL 168 08/01/2015    CHOL 207 (H) 04/01/2014     Lab Results   Component Value Date    HDL 61 09/25/2019    HDL 52 08/01/2015    HDL 61 04/01/2014     Lab Results   Component Value Date    LDLCALC 123.6 09/25/2019    LDLCALC 104.6 08/01/2015    LDLCALC 132.4 04/01/2014     Lab Results   Component Value Date    TRIG 77 09/25/2019    TRIG 57 08/01/2015    TRIG 68 04/01/2014     Lab Results   Component Value Date    CHOLHDL 30.5 09/25/2019    CHOLHDL 31.0 08/01/2015    CHOLHDL 29.5 04/01/2014     Lab Results   Component Value Date    TSH 2.047 08/01/2015       ASSESSMENT/PLAN     Radha Tabor is a 48 y.o. female     Radha was seen today for sinusitis.    Diagnoses and all orders for this visit:    Viral URI  -     benzonatate (TESSALON) 100 MG capsule; Take 1 capsule (100 mg total) by mouth 3 (three) times daily as needed for Cough.  -     ibuprofen (ADVIL,MOTRIN) 600 MG tablet; Take 1 tablet (600 mg total) by mouth 3 (three) times daily.    Other orders           Patient was counseled on when and how to seek emergent care.       Josefa Centeno PA-C   Department of Internal Medicine - [facility]   12:37 PM

## 2023-10-31 ENCOUNTER — OFFICE VISIT (OUTPATIENT)
Dept: SPINE | Facility: CLINIC | Age: 48
End: 2023-10-31
Payer: COMMERCIAL

## 2023-10-31 VITALS
OXYGEN SATURATION: 100 % | DIASTOLIC BLOOD PRESSURE: 65 MMHG | BODY MASS INDEX: 26.46 KG/M2 | HEART RATE: 75 BPM | TEMPERATURE: 99 F | RESPIRATION RATE: 18 BRPM | WEIGHT: 174 LBS | SYSTOLIC BLOOD PRESSURE: 115 MMHG

## 2023-10-31 DIAGNOSIS — M47.817 SPONDYLOSIS WITHOUT MYELOPATHY OR RADICULOPATHY, LUMBOSACRAL REGION: ICD-10-CM

## 2023-10-31 DIAGNOSIS — M54.9 DORSALGIA, UNSPECIFIED: Primary | ICD-10-CM

## 2023-10-31 DIAGNOSIS — M79.18 MYOFASCIAL PAIN: ICD-10-CM

## 2023-10-31 PROCEDURE — 1159F MED LIST DOCD IN RCRD: CPT | Mod: CPTII,S$GLB,, | Performed by: NURSE PRACTITIONER

## 2023-10-31 PROCEDURE — 99999 PR PBB SHADOW E&M-EST. PATIENT-LVL IV: ICD-10-PCS | Mod: PBBFAC,,, | Performed by: NURSE PRACTITIONER

## 2023-10-31 PROCEDURE — 3008F PR BODY MASS INDEX (BMI) DOCUMENTED: ICD-10-PCS | Mod: CPTII,S$GLB,, | Performed by: NURSE PRACTITIONER

## 2023-10-31 PROCEDURE — 1160F RVW MEDS BY RX/DR IN RCRD: CPT | Mod: CPTII,S$GLB,, | Performed by: NURSE PRACTITIONER

## 2023-10-31 PROCEDURE — 3008F BODY MASS INDEX DOCD: CPT | Mod: CPTII,S$GLB,, | Performed by: NURSE PRACTITIONER

## 2023-10-31 PROCEDURE — 3078F DIAST BP <80 MM HG: CPT | Mod: CPTII,S$GLB,, | Performed by: NURSE PRACTITIONER

## 2023-10-31 PROCEDURE — 3078F PR MOST RECENT DIASTOLIC BLOOD PRESSURE < 80 MM HG: ICD-10-PCS | Mod: CPTII,S$GLB,, | Performed by: NURSE PRACTITIONER

## 2023-10-31 PROCEDURE — 99999 PR PBB SHADOW E&M-EST. PATIENT-LVL IV: CPT | Mod: PBBFAC,,, | Performed by: NURSE PRACTITIONER

## 2023-10-31 PROCEDURE — 99214 PR OFFICE/OUTPT VISIT, EST, LEVL IV, 30-39 MIN: ICD-10-PCS | Mod: S$GLB,,, | Performed by: NURSE PRACTITIONER

## 2023-10-31 PROCEDURE — 3074F PR MOST RECENT SYSTOLIC BLOOD PRESSURE < 130 MM HG: ICD-10-PCS | Mod: CPTII,S$GLB,, | Performed by: NURSE PRACTITIONER

## 2023-10-31 PROCEDURE — 1159F PR MEDICATION LIST DOCUMENTED IN MEDICAL RECORD: ICD-10-PCS | Mod: CPTII,S$GLB,, | Performed by: NURSE PRACTITIONER

## 2023-10-31 PROCEDURE — 99214 OFFICE O/P EST MOD 30 MIN: CPT | Mod: S$GLB,,, | Performed by: NURSE PRACTITIONER

## 2023-10-31 PROCEDURE — 1160F PR REVIEW ALL MEDS BY PRESCRIBER/CLIN PHARMACIST DOCUMENTED: ICD-10-PCS | Mod: CPTII,S$GLB,, | Performed by: NURSE PRACTITIONER

## 2023-10-31 PROCEDURE — 3074F SYST BP LT 130 MM HG: CPT | Mod: CPTII,S$GLB,, | Performed by: NURSE PRACTITIONER

## 2023-10-31 NOTE — PROGRESS NOTES
Subjective:     Patient ID: Radha Tabor is a 48 y.o. female.    Chief Complaint: Low-back Pain    Interval History 10/31/2023:  Ms. Tabor presents today for followup of low back pain. She completed PT at [facility] with significant improvement in her back pain.     Interval History 8/2/2023:  Ms. Tabor presents today for follow-up of low back pain.  She is currently doing [facility] program with improvement in her symptoms.  She did have a minor setback with increased pain while attending a conference, but she was doing increased activities including walking and standing.  She has since returned to baseline.    HPI Ms. Tabor is a 48 year old female here for evaluation of low back pain    C/o bandlike low back pain for the past 4 years  Denies leg pain or numbness/tingling  Worse with turning in bed and sit to stand  No PT or injections in the past  Not interested in medications    Lumbar xray 2021    FINDINGS:  No significant alignment abnormality.  Vertebral body heights are normally maintained, without compression deformity at any level.  No definite spondylitic defects.  No disc narrowing.  Vertebral endplates are well defined.  No osseous destructive process.  SI joints appear unremarkable.     Impression:     No significant abnormality identified.    Past Medical History:   Diagnosis Date    Acute hepatitis C without mention of hepatic coma(070.51)     Endometriosis, site unspecified     Viral hepatitis C 6/18/2016    Viral hepatitis C 6/18/2016       Past Surgical History:   Procedure Laterality Date    Dx scope  10/16/2012    TDP hysteroscope    HYSTEROSCOPY      endometriosis    LASER LAPAROSCOPY      NASAL SEPTUM SURGERY         Family History   Problem Relation Age of Onset    Hypertension Mother     Heart disease Mother         murmur    Hypertension Father     Heart disease Paternal Grandmother     Breast cancer Neg Hx     Colon cancer Neg Hx     Ovarian cancer Neg Hx        Social History      Socioeconomic History    Marital status:    Tobacco Use    Smoking status: Never    Smokeless tobacco: Never   Substance and Sexual Activity    Alcohol use: No     Alcohol/week: 0.0 standard drinks of alcohol    Drug use: No    Sexual activity: Yes     Partners: Male     Birth control/protection: None   Social History Narrative     at [place],  (HTN), 2 step kids, 22, 21.     Social Determinants of Health     Financial Resource Strain: Low Risk  (10/23/2023)    Overall Financial Resource Strain (CARDIA)     Difficulty of Paying Living Expenses: Not hard at all   Food Insecurity: No Food Insecurity (10/23/2023)    Hunger Vital Sign     Worried About Running Out of Food in the Last Year: Never true     Ran Out of Food in the Last Year: Never true   Transportation Needs: No Transportation Needs (10/23/2023)    PRAPARE - Transportation     Lack of Transportation (Medical): No     Lack of Transportation (Non-Medical): No   Physical Activity: Sufficiently Active (10/23/2023)    Exercise Vital Sign     Days of Exercise per Week: 5 days     Minutes of Exercise per Session: 40 min   Stress: Stress Concern Present (10/23/2023)    [employer] - Occupational Stress Questionnaire     Feeling of Stress : To some extent   Social Connections: Unknown (10/23/2023)    Social Connection and Isolation Panel [NHANES]     Frequency of Communication with Friends and Family: More than three times a week     Frequency of Social Gatherings with Friends and Family: Once a week     Active Member of Clubs or Organizations: Yes     Attends Club or Organization Meetings: More than 4 times per year     Marital Status:    Housing Stability: Low Risk  (10/23/2023)    Housing Stability Vital Sign     Unable to Pay for Housing in the Last Year: No     Number of Places Lived in the Last Year: 2     Unstable Housing in the Last Year: No       Current Outpatient Medications    Medication Sig Dispense Refill    azelastine (ASTELIN) 137 mcg (0.1 %) nasal spray 2 sprays (274 mcg total) by Nasal route 2 (two) times daily. 30 mL 11    benzonatate (TESSALON) 100 MG capsule Take 1 capsule (100 mg total) by mouth 3 (three) times daily as needed for Cough. 30 capsule 0    fexofenadine (ALLEGRA) 180 MG tablet Take 1 tablet (180 mg total) by mouth once daily. 90 tablet 3    fluticasone propionate (FLONASE) 50 mcg/actuation nasal spray 2 sprays (100 mcg total) by Each Nostril route 2 (two) times a day. 16 g 11    ibuprofen (ADVIL,MOTRIN) 600 MG tablet Take 1 tablet (600 mg total) by mouth 3 (three) times daily. 20 tablet 0    ipratropium (ATROVENT) 21 mcg (0.03 %) nasal spray 2 sprays by Each Nostril route 2 (two) times daily as needed for Rhinitis. 30 mL 11    mupirocin (BACTROBAN) 2 % ointment Apply topically 3 (three) times daily. 15 g 0    olopatadine (PATANOL) 0.1 % ophthalmic solution Place 1 drop into both eyes 2 (two) times daily as needed for Allergies (itchy watery eyes). 5 mL 11    meloxicam (MOBIC) 15 MG tablet TAKE 1 TABLET(15 MG) BY MOUTH EVERY DAY (Patient not taking: Reported on 4/11/2023) 90 tablet 0    valACYclovir (VALTREX) 1000 MG tablet Take 1 tablet (1,000 mg total) by mouth every 12 (twelve) hours. for 10 days 20 tablet 6     No current facility-administered medications for this visit.       Review of patient's allergies indicates:   Allergen Reactions    Hay fever and allergy relief Hives and Itching         Review of Systems   Constitutional: Negative for fever.   Cardiovascular:  Negative for chest pain.   Respiratory:  Negative for shortness of breath.    Musculoskeletal:  Negative for back pain and falls.   Gastrointestinal:  Negative for abdominal pain, bowel incontinence, nausea and vomiting.   Genitourinary:  Negative for bladder incontinence.   Neurological:  Negative for numbness and paresthesias.        Objective:     General: Radha is well-developed,  well-nourished, appears stated age, in no acute distress, alert and oriented to time, place and person.     General    Vitals reviewed.  Constitutional: She is oriented to person, place, and time. She appears well-developed and well-nourished.   HENT:   Head: Atraumatic.   Nose: Nose normal.   Eyes: Conjunctivae are normal.   Cardiovascular:  Normal rate.            Pulmonary/Chest: Effort normal.   Neurological: She is alert and oriented to person, place, and time.   Psychiatric: She has a normal mood and affect. Her behavior is normal. Judgment and thought content normal.     General Musculoskeletal Exam   Gait: normal     Back (L-Spine & T-Spine) / Neck (C-Spine) Exam     Back (L-Spine & T-Spine) Range of Motion   Extension:  20   Flexion:  90   Lateral bend right:  10   Lateral bend left:  10     Spinal Sensation   Right Side Sensation  L-Spine Level: normal  S-Spine Level: normal  Left Side Sensation  L-Spine Level: normal  S-Spine Level: normal    Other   She has no scoliosis .  Spinal Kyphosis:  Absent      Muscle Strength   Right Upper Extremity   Biceps: 5/5   Deltoid:  5/5  Triceps:  5/5  Left Upper Extremity  Biceps: 5/5   Deltoid:  5/5  Triceps:  5/5  Right Lower Extremity   Hip Flexion: 5/5   Quadriceps:  5/5   Ankle Dorsiflexion:  5/5   Anterior tibial:  5/5   EHL:  5/5  Left Lower Extremity   Hip Flexion: 5/5   Quadriceps:  5/5   Ankle Dorsiflexion:  5/5   Anterior tibial:  5/5   EHL:  5/5    Reflexes     Left Side  Biceps:  2+  Brachioradialis:  2+  Achilles:  2+  Left Vanessa's Sign:  Absent  Babinski Sign:  absent    Right Side   Biceps:  2+  Brachioradialis:  2+  Achilles:  2+  Right Vanessa's Sign:  absent  Babinski Sign:  absent    Vascular Exam     Right Pulses        Carotid:                  2+    Left Pulses        Carotid:                  2+          Assessment:     1. Dorsalgia, unspecified    2. Spondylosis without myelopathy or radiculopathy, lumbosacral region    3. Myofascial pain              Plan:        Prior records reviewed today  Continue HEP  We discussed posture sitting and the importance of trying to sit better.    We discussed the benefits of therapy and exercise and continuing to move.   RTC for followup as needed    Follow-up: No follow-ups on file. If there are any questions prior to this, the patient was instructed to contact the office.

## 2023-12-02 DIAGNOSIS — J32.9 CHRONIC SINUSITIS, UNSPECIFIED LOCATION: ICD-10-CM

## 2023-12-02 DIAGNOSIS — J31.0 CHRONIC RHINITIS: ICD-10-CM

## 2023-12-04 RX ORDER — AZELASTINE 1 MG/ML
2 SPRAY, METERED NASAL 2 TIMES DAILY
Qty: 30 ML | Refills: 0 | Status: SHIPPED | OUTPATIENT
Start: 2023-12-04

## 2023-12-04 RX ORDER — IPRATROPIUM BROMIDE 21 UG/1
2 SPRAY, METERED NASAL 2 TIMES DAILY PRN
Qty: 30 ML | Refills: 0 | Status: SHIPPED | OUTPATIENT
Start: 2023-12-04

## 2024-06-01 ENCOUNTER — PATIENT MESSAGE (OUTPATIENT)
Dept: ADMINISTRATIVE | Facility: HOSPITAL | Age: 49
End: 2024-06-01
Payer: COMMERCIAL

## 2024-06-03 ENCOUNTER — PATIENT OUTREACH (OUTPATIENT)
Dept: ADMINISTRATIVE | Facility: HOSPITAL | Age: 49
End: 2024-06-03
Payer: COMMERCIAL

## 2024-06-03 NOTE — PROGRESS NOTES
Health Maintenance Due   Topic Date Due    Pneumococcal Vaccines (Age 0-64) (1 of 2 - PCV) Never done    TETANUS VACCINE  Never done    Hemoglobin A1c (Diabetic Prevention Screening)  08/01/2018    COVID-19 Vaccine (4 - 2023-24 season) 09/01/2023    Mammogram  05/04/2024     Triggered LINKS and reconciled immunizations. Updated Care Everywhere. Pt responded to campaigns outreach asking to schedule mammogram- pt has mammogram appt scheduled for 6/4/2024. Chart review completed.

## 2024-06-04 ENCOUNTER — HOSPITAL ENCOUNTER (OUTPATIENT)
Dept: RADIOLOGY | Facility: HOSPITAL | Age: 49
Discharge: HOME | End: 2024-06-04
Attending: INTERNAL MEDICINE
Payer: COMMERCIAL

## 2024-06-04 ENCOUNTER — PATIENT MESSAGE (OUTPATIENT)
Dept: INTERNAL MEDICINE | Facility: CLINIC | Age: 49
End: 2024-06-04
Payer: COMMERCIAL

## 2024-06-04 DIAGNOSIS — Z12.31 ENCOUNTER FOR SCREENING MAMMOGRAM FOR BREAST CANCER: ICD-10-CM

## 2024-06-04 PROCEDURE — 77063 BREAST TOMOSYNTHESIS BI: CPT | Mod: 26,,, | Performed by: RADIOLOGY

## 2024-06-04 PROCEDURE — 77067 SCR MAMMO BI INCL CAD: CPT | Mod: TC

## 2024-06-04 PROCEDURE — 77067 SCR MAMMO BI INCL CAD: CPT | Mod: 26,,, | Performed by: RADIOLOGY

## 2024-06-04 PROCEDURE — 77063 BREAST TOMOSYNTHESIS BI: CPT | Mod: TC

## 2024-06-26 ENCOUNTER — HOSPITAL ENCOUNTER (OUTPATIENT)
Dept: RADIOLOGY | Facility: HOSPITAL | Age: 49
Discharge: HOME | End: 2024-06-26
Attending: INTERNAL MEDICINE
Payer: COMMERCIAL

## 2024-06-26 DIAGNOSIS — R92.8 ABNORMAL MAMMOGRAM OF LEFT BREAST: ICD-10-CM

## 2024-06-26 PROCEDURE — 77065 DX MAMMO INCL CAD UNI: CPT | Mod: 26,LT,, | Performed by: RADIOLOGY

## 2024-06-26 PROCEDURE — 77061 BREAST TOMOSYNTHESIS UNI: CPT | Mod: 26,LT,, | Performed by: RADIOLOGY

## 2024-06-26 PROCEDURE — 76642 ULTRASOUND BREAST LIMITED: CPT | Mod: 26,LT,, | Performed by: RADIOLOGY

## 2024-06-26 PROCEDURE — 77061 BREAST TOMOSYNTHESIS UNI: CPT | Mod: TC,LT

## 2024-06-26 PROCEDURE — 76642 ULTRASOUND BREAST LIMITED: CPT | Mod: TC,LT

## 2024-07-02 ENCOUNTER — PATIENT MESSAGE (OUTPATIENT)
Dept: INTERNAL MEDICINE | Facility: CLINIC | Age: 49
End: 2024-07-02

## 2024-07-02 ENCOUNTER — OFFICE VISIT (OUTPATIENT)
Dept: INTERNAL MEDICINE | Facility: CLINIC | Age: 49
End: 2024-07-02
Payer: COMMERCIAL

## 2024-07-02 ENCOUNTER — LAB VISIT (OUTPATIENT)
Dept: LAB | Facility: HOSPITAL | Age: 49
End: 2024-07-02
Attending: INTERNAL MEDICINE
Payer: COMMERCIAL

## 2024-07-02 VITALS
BODY MASS INDEX: 26.43 KG/M2 | SYSTOLIC BLOOD PRESSURE: 120 MMHG | DIASTOLIC BLOOD PRESSURE: 70 MMHG | WEIGHT: 174.38 LBS | OXYGEN SATURATION: 98 % | HEART RATE: 76 BPM | HEIGHT: 68 IN

## 2024-07-02 DIAGNOSIS — Z00.00 ROUTINE PHYSICAL EXAMINATION: Primary | ICD-10-CM

## 2024-07-02 DIAGNOSIS — B19.20 HEPATITIS C VIRUS INFECTION WITHOUT HEPATIC COMA, UNSPECIFIED CHRONICITY: ICD-10-CM

## 2024-07-02 DIAGNOSIS — R53.83 FATIGUE, UNSPECIFIED TYPE: ICD-10-CM

## 2024-07-02 DIAGNOSIS — J31.0 CHRONIC RHINITIS: ICD-10-CM

## 2024-07-02 DIAGNOSIS — J32.9 CHRONIC SINUSITIS, UNSPECIFIED LOCATION: ICD-10-CM

## 2024-07-02 DIAGNOSIS — Z00.00 ROUTINE PHYSICAL EXAMINATION: ICD-10-CM

## 2024-07-02 LAB
ALBUMIN SERPL BCP-MCNC: 3.9 G/DL (ref 3.5–5.2)
ALP SERPL-CCNC: 43 U/L (ref 55–135)
ALT SERPL W/O P-5'-P-CCNC: 10 U/L (ref 10–44)
ANION GAP SERPL CALC-SCNC: 10 MMOL/L (ref 8–16)
AST SERPL-CCNC: 25 U/L (ref 10–40)
BASOPHILS # BLD AUTO: 0.03 K/UL (ref 0–0.2)
BASOPHILS NFR BLD: 0.8 % (ref 0–1.9)
BILIRUB SERPL-MCNC: 1 MG/DL (ref 0.1–1)
BUN SERPL-MCNC: 10 MG/DL (ref 6–20)
CALCIUM SERPL-MCNC: 9.5 MG/DL (ref 8.7–10.5)
CHLORIDE SERPL-SCNC: 106 MMOL/L (ref 95–110)
CHOLEST SERPL-MCNC: 226 MG/DL (ref 120–199)
CHOLEST/HDLC SERPL: 3.8 {RATIO} (ref 2–5)
CO2 SERPL-SCNC: 21 MMOL/L (ref 23–29)
CREAT SERPL-MCNC: 0.8 MG/DL (ref 0.5–1.4)
DIFFERENTIAL METHOD BLD: ABNORMAL
EOSINOPHIL # BLD AUTO: 0.1 K/UL (ref 0–0.5)
EOSINOPHIL NFR BLD: 3.2 % (ref 0–8)
ERYTHROCYTE [DISTWIDTH] IN BLOOD BY AUTOMATED COUNT: 13.5 % (ref 11.5–14.5)
EST. GFR  (NO RACE VARIABLE): >60 ML/MIN/1.73 M^2
ESTIMATED AVG GLUCOSE: 94 MG/DL (ref 68–131)
GLUCOSE SERPL-MCNC: 78 MG/DL (ref 70–110)
HBA1C MFR BLD: 4.9 % (ref 4–5.6)
HCT VFR BLD AUTO: 37.1 % (ref 37–48.5)
HDLC SERPL-MCNC: 59 MG/DL (ref 40–75)
HDLC SERPL: 26.1 % (ref 20–50)
HGB BLD-MCNC: 11.5 G/DL (ref 12–16)
IMM GRANULOCYTES # BLD AUTO: 0 K/UL (ref 0–0.04)
IMM GRANULOCYTES NFR BLD AUTO: 0 % (ref 0–0.5)
LDLC SERPL CALC-MCNC: 153 MG/DL (ref 63–159)
LYMPHOCYTES # BLD AUTO: 1.6 K/UL (ref 1–4.8)
LYMPHOCYTES NFR BLD: 41.7 % (ref 18–48)
MCH RBC QN AUTO: 28.6 PG (ref 27–31)
MCHC RBC AUTO-ENTMCNC: 31 G/DL (ref 32–36)
MCV RBC AUTO: 92 FL (ref 82–98)
MONOCYTES # BLD AUTO: 0.3 K/UL (ref 0.3–1)
MONOCYTES NFR BLD: 8 % (ref 4–15)
NEUTROPHILS # BLD AUTO: 1.7 K/UL (ref 1.8–7.7)
NEUTROPHILS NFR BLD: 46.3 % (ref 38–73)
NONHDLC SERPL-MCNC: 167 MG/DL
NRBC BLD-RTO: 0 /100 WBC
PLATELET # BLD AUTO: 258 K/UL (ref 150–450)
PMV BLD AUTO: 10.3 FL (ref 9.2–12.9)
POTASSIUM SERPL-SCNC: 3.6 MMOL/L (ref 3.5–5.1)
PROT SERPL-MCNC: 7.8 G/DL (ref 6–8.4)
RBC # BLD AUTO: 4.02 M/UL (ref 4–5.4)
SODIUM SERPL-SCNC: 137 MMOL/L (ref 136–145)
TRIGL SERPL-MCNC: 70 MG/DL (ref 30–150)
TSH SERPL DL<=0.005 MIU/L-ACNC: 2.84 UIU/ML (ref 0.4–4)
WBC # BLD AUTO: 3.74 K/UL (ref 3.9–12.7)

## 2024-07-02 PROCEDURE — 36415 COLL VENOUS BLD VENIPUNCTURE: CPT | Performed by: INTERNAL MEDICINE

## 2024-07-02 PROCEDURE — 99999 PR PBB SHADOW E&M-EST. PATIENT-LVL III: CPT | Mod: PBBFAC,,, | Performed by: INTERNAL MEDICINE

## 2024-07-02 PROCEDURE — 85025 COMPLETE CBC W/AUTO DIFF WBC: CPT | Performed by: INTERNAL MEDICINE

## 2024-07-02 PROCEDURE — 99396 PREV VISIT EST AGE 40-64: CPT | Mod: S$GLB,,, | Performed by: INTERNAL MEDICINE

## 2024-07-02 PROCEDURE — 80053 COMPREHEN METABOLIC PANEL: CPT | Performed by: INTERNAL MEDICINE

## 2024-07-02 PROCEDURE — 1160F RVW MEDS BY RX/DR IN RCRD: CPT | Mod: CPTII,S$GLB,, | Performed by: INTERNAL MEDICINE

## 2024-07-02 PROCEDURE — 83036 HEMOGLOBIN GLYCOSYLATED A1C: CPT | Performed by: INTERNAL MEDICINE

## 2024-07-02 PROCEDURE — 80061 LIPID PANEL: CPT | Performed by: INTERNAL MEDICINE

## 2024-07-02 PROCEDURE — 3008F BODY MASS INDEX DOCD: CPT | Mod: CPTII,S$GLB,, | Performed by: INTERNAL MEDICINE

## 2024-07-02 PROCEDURE — 3074F SYST BP LT 130 MM HG: CPT | Mod: CPTII,S$GLB,, | Performed by: INTERNAL MEDICINE

## 2024-07-02 PROCEDURE — 3078F DIAST BP <80 MM HG: CPT | Mod: CPTII,S$GLB,, | Performed by: INTERNAL MEDICINE

## 2024-07-02 PROCEDURE — 87522 HEPATITIS C REVRS TRNSCRPJ: CPT | Performed by: INTERNAL MEDICINE

## 2024-07-02 PROCEDURE — 1159F MED LIST DOCD IN RCRD: CPT | Mod: CPTII,S$GLB,, | Performed by: INTERNAL MEDICINE

## 2024-07-02 PROCEDURE — 84443 ASSAY THYROID STIM HORMONE: CPT | Performed by: INTERNAL MEDICINE

## 2024-07-02 RX ORDER — AZELASTINE 1 MG/ML
2 SPRAY, METERED NASAL 2 TIMES DAILY
Qty: 30 ML | Refills: 6 | Status: SHIPPED | OUTPATIENT
Start: 2024-07-02

## 2024-07-02 RX ORDER — FLUTICASONE PROPIONATE 50 MCG
2 SPRAY, SUSPENSION (ML) NASAL 2 TIMES DAILY
Qty: 16 G | Refills: 11 | Status: SHIPPED | OUTPATIENT
Start: 2024-07-02

## 2024-07-02 RX ORDER — IPRATROPIUM BROMIDE 21 UG/1
2 SPRAY, METERED NASAL 2 TIMES DAILY PRN
Qty: 30 ML | Refills: 6 | Status: SHIPPED | OUTPATIENT
Start: 2024-07-02

## 2024-07-02 NOTE — PROGRESS NOTES
Subjective:       Patient ID: Radha Tabor is a 49 y.o. female.    Chief Complaint: Annual Exam and Establish Care    HPI:  Very friendly 49-year-old female, new to me, here to establish care and for annual exam.  I have seen her  for a few years.  She has been through a number of things both medically and personally/professionally over the last several years.  She was a  than a  and now is the  of the former [place] [employer] soon to be [employer].  She does quite a bit of traveling, has found it difficult to exercise because of travels and at times the job is very stressful but she is going to [place] in a week and hopes to recharge.  She has had some fatigue lately and difficulty sleeping.  Her mom is going through final treatment for breast cancer.  Our patient was going through fertility evaluation and treatment a few years ago and was discovered to have hepatitis-C so underwent extensive at the time experimental treatment.  She was somewhat depressed at that time as well.  Incidentally she does snore quite a bit so we discussed sleep apnea briefly but for now we will focus on labs and an exercise routine.  She is not postmenopausal.  She did have a colonoscopy      Review of Systems   Constitutional:  Positive for fatigue. Negative for appetite change, chills and fever.   HENT:  Negative for nosebleeds and sore throat.    Eyes:  Negative for pain and visual disturbance.   Respiratory:  Negative for cough, shortness of breath and wheezing.    Cardiovascular:  Negative for chest pain and leg swelling.   Gastrointestinal:  Negative for abdominal pain, constipation and diarrhea.   Endocrine: Negative for polyuria.   Genitourinary:  Negative for difficulty urinating, hematuria and vaginal bleeding.   Musculoskeletal:  Negative for arthralgias, back pain, gait problem and neck pain.   Integumentary:  Negative for pallor and rash.    Neurological:  Negative for tremors, seizures and headaches.   Hematological:  Does not bruise/bleed easily.   Psychiatric/Behavioral:  Positive for sleep disturbance (occasional snoring and occasional difficulty sleeping). Negative for dysphoric mood. The patient is nervous/anxious (at times work can be stressful).            Past Medical History:   Diagnosis Date    Acute hepatitis C without mention of hepatic coma(070.51)     Endometriosis, site unspecified     Viral hepatitis C 6/18/2016    Viral hepatitis C 6/18/2016     Past Surgical History:   Procedure Laterality Date    Dx scope  10/16/2012    TDP hysteroscope    HYSTEROSCOPY      endometriosis    LASER LAPAROSCOPY      NASAL SEPTUM SURGERY        Patient Active Problem List   Diagnosis    Dysmenorrhea    Endometriosis of pelvis    Breast mass    Perennial allergic rhinitis    Viral hepatitis C    Primary osteoarthritis of left knee    Decreased strength of trunk and back    Alteration in mobility associated with pain        Objective:      Physical Exam  Constitutional:       General: She is not in acute distress.     Appearance: She is well-developed.   HENT:      Head: Normocephalic and atraumatic.      Right Ear: Tympanic membrane, ear canal and external ear normal.      Left Ear: Tympanic membrane, ear canal and external ear normal.      Mouth/Throat:      Pharynx: No oropharyngeal exudate or posterior oropharyngeal erythema.   Eyes:      General: No scleral icterus.     Conjunctiva/sclera: Conjunctivae normal.      Pupils: Pupils are equal, round, and reactive to light.   Neck:      Thyroid: No thyromegaly.   Cardiovascular:      Rate and Rhythm: Normal rate and regular rhythm.      Pulses: Normal pulses.      Heart sounds: No murmur heard.  Pulmonary:      Effort: Pulmonary effort is normal.      Breath sounds: Normal breath sounds. No wheezing.   Abdominal:      General: Bowel sounds are normal. There is no distension.      Palpations: Abdomen is  soft.      Tenderness: There is no abdominal tenderness.   Musculoskeletal:         General: No tenderness.      Cervical back: Normal range of motion and neck supple.      Right lower leg: No edema.      Left lower leg: No edema.   Lymphadenopathy:      Cervical: No cervical adenopathy.   Skin:     Coloration: Skin is not jaundiced.      Findings: No rash.   Neurological:      General: No focal deficit present.      Mental Status: She is alert and oriented to person, place, and time.   Psychiatric:         Mood and Affect: Mood normal.         Behavior: Behavior normal.         Assessment:       Problem List Items Addressed This Visit          GI    Viral hepatitis C    Relevant Orders    Hepatitis C RNA, Quantitative, PCR     Other Visit Diagnoses       Routine physical examination    -  Primary    Relevant Orders    Lipid Panel    CBC Auto Differential    Comprehensive Metabolic Panel    Hemoglobin A1C    TSH    Hepatitis C RNA, Quantitative, PCR    Chronic sinusitis, unspecified location        Relevant Medications    azelastine (ASTELIN) 137 mcg (0.1 %) nasal spray    fluticasone propionate (FLONASE) 50 mcg/actuation nasal spray    ipratropium (ATROVENT) 21 mcg (0.03 %) nasal spray    Chronic rhinitis        Relevant Medications    azelastine (ASTELIN) 137 mcg (0.1 %) nasal spray    fluticasone propionate (FLONASE) 50 mcg/actuation nasal spray    ipratropium (ATROVENT) 21 mcg (0.03 %) nasal spray    Fatigue, unspecified type                Plan:         Radha was seen today for annual exam and establish care.    Diagnoses and all orders for this visit:    Routine physical examination  -     Lipid Panel; Future  -     CBC Auto Differential; Future  -     Comprehensive Metabolic Panel; Future  -     Hemoglobin A1C; Future  -     TSH; Future  -     Hepatitis C RNA, Quantitative, PCR; Future    Chronic sinusitis, unspecified location  -     azelastine (ASTELIN) 137 mcg (0.1 %) nasal spray; 2 sprays (274 mcg total)  "by Nasal route 2 (two) times daily.  -     fluticasone propionate (FLONASE) 50 mcg/actuation nasal spray; 2 sprays (100 mcg total) by Each Nostril route 2 (two) times a day.  -     ipratropium (ATROVENT) 21 mcg (0.03 %) nasal spray; 2 sprays by Each Nostril route 2 (two) times daily as needed for Rhinitis.    Chronic rhinitis  -     azelastine (ASTELIN) 137 mcg (0.1 %) nasal spray; 2 sprays (274 mcg total) by Nasal route 2 (two) times daily.  -     fluticasone propionate (FLONASE) 50 mcg/actuation nasal spray; 2 sprays (100 mcg total) by Each Nostril route 2 (two) times a day.  -     ipratropium (ATROVENT) 21 mcg (0.03 %) nasal spray; 2 sprays by Each Nostril route 2 (two) times daily as needed for Rhinitis.    Hepatitis C virus infection without hepatic coma, unspecified chronicity  -     Hepatitis C RNA, Quantitative, PCR; Future    Fatigue, unspecified type       Review all studies.  Try to get at least 2 exercise sessions per week  Monitor weight   If all studies unremarkable consider sleep evaluation.  Follow-up in a few months sooner as needed              Portions of this note may have been created with voice recognition software. Occasional "wrong-word" or "sound-a-like" substitutions may have occurred due to the inherent limitations of voice recognition software. Please, read the note carefully and recognize, using context, where substitutions have occurred.  "

## 2024-07-03 ENCOUNTER — PATIENT MESSAGE (OUTPATIENT)
Dept: INTERNAL MEDICINE | Facility: CLINIC | Age: 49
End: 2024-07-03
Payer: COMMERCIAL

## 2024-07-03 LAB
HCV RNA SERPL QL NAA+PROBE: NOT DETECTED
HCV RNA SPEC NAA+PROBE-ACNC: NOT DETECTED IU/ML

## 2024-11-11 NOTE — PROGRESS NOTES
CC: low back pain    45 y.o. Female presents today for evaluation of her low back pain. Patient is the  at [employer]. She admits to constant and diffuse low back pain for the past 4-5 months after bending forward to pick something up. She indicates her pain increased in severity as she returned to standing. She denies numbness/tingling, radiating pain, bowel/bladder incontinence. When asked where she hurts she gestures across her low back. She denies recent falls or trauma.   How long: Patient has been experiencing pain for the past 4-5 months.  What makes it better: Patient has been unable to find anything to improve her pain at this time.   What makes it worse: Patient admits to increased pain with sitting and standing.   Does it radiate: Denies radiating pain.   Numbness/tingling down legs: Denies numbness/tingling down legs.   Attempted treatments: Patient states she purchased a new mattress but has not appreciated improvement in her symptoms with this. She denies taking medications for this problem. Denies heat or ice. Denies physical therapy and denies history of back injection/surgery.   Pain score: 2/10  Any mechanical symptoms: Denies mechanical symptoms.   Feelings of instability: Denies feelings of instability.   Problems with ADLs: Admits to this problem affecting her ability to perform ADLs.     REVIEW OF SYSTEMS:   Constitution: Patient denies fever, chills, night sweats, and weight changes.  Eyes: Patient denies eye pain or vision changes.  HENT: Patient denies headache, ear pain, sore throat, or nasal discharge.  CVS: Patient denies chest pain.  Lungs: Patient denies shortness of breath or cough.  Abd: Patient denies stomach pain, nausea, or vomiting.  Skin: Patient denies skin rash or itching.    Hematologic/Lymphatic: Patient denies easy bruising.   Musculoskeletal: Patient denies recent falls. See HPI.  Psych: Patient denies any current anxiety or nervousness.    PAST  MEDICAL HISTORY:   Past Medical History:   Diagnosis Date    Acute hepatitis C without mention of hepatic coma(070.51)     Endometriosis, site unspecified     Viral hepatitis C 6/18/2016    Viral hepatitis C 6/18/2016       PAST SURGICAL HISTORY:   Past Surgical History:   Procedure Laterality Date    Dx scope  10/16/2012    TDP hysteroscope    HYSTEROSCOPY      endometriosis    LASER LAPAROSCOPY      NASAL SEPTUM SURGERY         FAMILY HISTORY:   Family History   Problem Relation Age of Onset    Hypertension Mother     Heart disease Mother         murmur    Hypertension Father     Heart disease Paternal Grandmother     Breast cancer Neg Hx     Colon cancer Neg Hx     Ovarian cancer Neg Hx        SOCIAL HISTORY:   Social History     Socioeconomic History    Marital status:      Spouse name: Not on file    Number of children: Not on file    Years of education: Not on file    Highest education level: Not on file   Occupational History    Not on file   Social Needs    Financial resource strain: Not hard at all    Food insecurity     Worry: Never true     Inability: Never true    Transportation needs     Medical: No     Non-medical: No   Tobacco Use    Smoking status: Never Smoker    Smokeless tobacco: Never Used   Substance and Sexual Activity    Alcohol use: No     Alcohol/week: 0.0 standard drinks     Frequency: Never     Drinks per session: Patient refused     Binge frequency: Never    Drug use: No    Sexual activity: Yes     Partners: Male     Birth control/protection: None   Lifestyle    Physical activity     Days per week: 1 day     Minutes per session: Not on file    Stress: Only a little   Relationships    Social connections     Talks on phone: More than three times a week     Gets together: Once a week     Attends Faith service: Not on file     Active member of club or organization: Yes     Attends meetings of clubs or organizations: More than 4 times per year      "Relationship status:    Other Topics Concern    Not on file   Social History Narrative     at [employer],  (HTN), 2 step kids, 20, 18.       MEDICATIONS:     Current Outpatient Medications:     fluticasone propionate (FLONASE) 50 mcg/actuation nasal spray, 2 sprays (100 mcg total) by Each Nostril route once daily., Disp: 16 g, Rfl: 6    albuterol 90 mcg/actuation inhaler, Inhale 1-2 puffs into the lungs every 6 (six) hours as needed for Wheezing., Disp: 1 Inhaler, Rfl: 0    fexofenadine (ALLEGRA) 180 MG tablet, Take 1 tablet (180 mg total) by mouth once daily., Disp: 30 tablet, Rfl: 6    flu vacc [id number] 6mos up,PF, (FLUARIX QUAD [id number], PF,) 60 mcg (15 mcg x 4)/0.5 mL Syrg, Inject 0.5 mLs into the muscle. (Patient not taking: Reported on 12/11/2019), Disp: 0.5 mL, Rfl: 0    meloxicam (MOBIC) 15 MG tablet, Take 1 tablet (15 mg total) by mouth once daily. (Patient not taking: Reported on 12/11/2019), Disp: 30 tablet, Rfl: 2    mupirocin (BACTROBAN) 2 % ointment, Apply to affected area 3 times daily, Disp: 22 g, Rfl: 1    ofloxacin (FLOXIN) 0.3 % otic solution, Place 5 drops into the left ear once daily., Disp: 5 mL, Rfl: 0    ALLERGIES:   Review of patient's allergies indicates:   Allergen Reactions    Hay fever and allergy relief Hives and Itching        PHYSICAL EXAMINATION:  /78   Pulse 89   Ht 5' 8" (1.727 m)   Wt 73.5 kg (162 lb)   BMI 24.63 kg/m²   Vitals signs and nursing note have been reviewed.  General: In no acute distress, well developed, well nourished, no diaphoresis  Eyes: EOM full and smooth, no eye redness or discharge  HENT: normocephalic and atraumatic, neck supple, trachea midline, no nasal discharge, no external ear redness or discharge  Cardiovascular: no LE edema  Lungs: respirations non-labored, no conversational dyspnea   Abd: non-distended, no rigidity  MSK: no amputation or deformity, no swelling of extremities  Neuro: AAOx3, CN2-12 " accepted grossly intact  Skin: No rashes, warm and dry  Psychiatric: cooperative, pleasant, mood and affect appropriate for age    Lumbar Spine: lumbar region    Observation:    Normal cervicothoracolumbar curves.    No obvious pelvic obliquity while standing.    No edema, erythema, or ecchymosis noted in lumbosacral region.    No midline skin abnormalities.    No atrophy of lower limb musculature.    Tenderness:  + tenderness throughout the LEFT lumbar spine, iliolumbar region, posterior pelvis.  + tenderness over the left sacral base  No tenderness over the piriformis, greater/lesser trochanters.  No bony deformities or step-offs palpated.     Range of Motion:  Active flexion to 60°.   Active extension to 25°.   Active rotation to 30° on left and 30° on right.    Active sidebending to 25° on left and 25° on right.  Passive hip flexion to 135° on left and 135° on right.    Passive hip internal rotation to 45° on left and 45° on right.    Passive hip external rotation to 45° on left and 45° on right.    Pain present with forward flexion at the waist and extension at the waist, improved after OMT    Strength Testing:  Hip flexion - 5/5 on left and 5/5 on right  Hip extension - 5/5 on left and 5/5 on right  Knee flexion - 5/5 on left and 5/5 on right  Knee extension - 5/5 on left and 5/5 on right  Dorsiflexion - 5/5 on left and 5/5 on right  Plantarflexion - 5/5 on left and 5/5 on right  Great toe extension - 5/5 on left and 5/5 on right    Special Tests:  Standing Aleman's test - negative on left and negative on right  Stork test - negative on left and negative on right    Seated straight leg raise - negative on left and negative on right  Supine straight leg raise - negative on left and negative on right  Slump test - negative on left and negative on right  Provocation maneuvers exhibit no worsening of symptoms on left or on right.    MANNY test - negative  FADIR test - negative  Log roll test - negative    Posture:   Upright  Gait: Non-antalgic with Neutral ankle mechanics     TART (Tissue texture abnormality, Asymmetry,  Restriction of motion and/or Tenderness) changes:     Cervical Spine  Thoracic Spine  Lumbar Spine   C1  T1 Neutral L1 FRSR   C2  T2 Neutral L2 Neutral   C3  T3 Neutral L3 Neutral   C4  T4 Neutral L4 ERSL   C5  T5 Neutral L5 ERSL   C6  T6 NSRRL     C7  T7 NSRRL       T8 NSRRL       T9 NSRRL       T10 Neutral       T11 Neutral       T12 Neutral       Ribs:    Upper Extremity:    Abdomen:    Pelvis:  · Innominate:Left superior shear  · Pubic bone:Left superior pubic shear    Sacrum:Left unilateral extension  Fascial herniated trigger point at the left sacral base    Lower Extremity:  Internal tibial torsion on the left    Key   F= Flexed   E = Extended   R = Rotated   S = Sidebent   TTA = tissue texture abnormality       Neurovascular Exam:  Reflexes +2/4 and symmetric at the L4 and S1 dermatomes.    Sensation intact to light touch in the L2-S2 dermatomes bilaterally.   No pretibial edema or abnormal hair pattern of the shin.    Intact and symmetric DP and PT pulses bilaterally.  Normal gait without trendelenberg, heel walking, toe walking, and tandem walking.      ASSESSMENT:      ICD-10-CM ICD-9-CM   1. Sacral back pain  M53.3 724.6   2. Somatic dysfunction of lumbar region  M99.03 739.3   3. Somatic dysfunction of pelvis region  M99.05 739.5   4. Somatic dysfunction of lower extremity  M99.06 739.6   5. Somatic dysfunction of sacral region  M99.04 739.4   6. Somatic dysfunction of thoracic region  M99.02 739.2       PLAN:  1. 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 She indicates she has had diffuse, contant low back pain since this event. She denies radicular symptoms.     - Based on her description of pain/body language and somatic dysfunction identified on exam, I discussed osteopathic manipulation as a treatment option  today. She consents to evaluation and treatment. See below.     - HEP for basic core prescribed today. Handout provided, explained, and exercises were demonstrated as needed. Encouraged to do daily.  01328 HOME EXERCISE PROGRAM (HEP):  The patient was taught a homegoing physical therapy regimen as described above. The patient demonstrated understanding of the exercises and proper technique of their execution. This interaction took 15 minutes.       2-6.  Somatic dysfunction of lumbar, pelvis, lower extremity, sacral, thoracic regions -     - OMT 5-6 regions. Oral consent obtained. Reviewed benefits and potential side effects. OMT indicated today due to signs and symptoms as well as local and remote somatic dysfunction findings and their related neurokinetic, lymphatic, fascial and/or arteriovenous body connections. OMT techniques used: Soft Tissue, Myofascial Release, Muscle Energy, High Velocity Low Amplitude and Fascial Distortion Model. Treatment was tolerated well. Improvement noted in segmental mobility post-treatment in dysfunctional regions. There were no adverse events and no complications immediately following treatment. Advised plenty of water to help alleviate soreness.        Future planning includes - possibly more OMT if helpful and if indicated    All questions were answered to the best of my ability and all concerns were addressed at this time.    Follow up in 1 week for above, or sooner if needed.      This note is dictated using the M*Modal Fluency Direct word recognition program. There are word recognition mistakes that are occasionally missed on review.

## 2025-06-11 ENCOUNTER — OFFICE VISIT (OUTPATIENT)
Dept: URGENT CARE | Facility: CLINIC | Age: 50
End: 2025-06-11
Payer: COMMERCIAL

## 2025-06-11 VITALS
BODY MASS INDEX: 26.43 KG/M2 | SYSTOLIC BLOOD PRESSURE: 120 MMHG | TEMPERATURE: 98 F | RESPIRATION RATE: 18 BRPM | HEART RATE: 75 BPM | DIASTOLIC BLOOD PRESSURE: 81 MMHG | WEIGHT: 174.38 LBS | HEIGHT: 68 IN | OXYGEN SATURATION: 98 %

## 2025-06-11 DIAGNOSIS — J30.2 SEASONAL ALLERGIES: ICD-10-CM

## 2025-06-11 DIAGNOSIS — K12.1 STOMATITIS: ICD-10-CM

## 2025-06-11 DIAGNOSIS — R53.83 FATIGUE, UNSPECIFIED TYPE: Primary | ICD-10-CM

## 2025-06-11 DIAGNOSIS — J02.9 PHARYNGITIS, UNSPECIFIED ETIOLOGY: ICD-10-CM

## 2025-06-11 DIAGNOSIS — B34.9 VIRAL ILLNESS: ICD-10-CM

## 2025-06-11 LAB
CTP QC/QA: YES
SARS-COV+SARS-COV-2 AG RESP QL IA.RAPID: NEGATIVE

## 2025-06-11 PROCEDURE — 87811 SARS-COV-2 COVID19 W/OPTIC: CPT | Mod: QW,S$GLB,,

## 2025-06-11 PROCEDURE — 99214 OFFICE O/P EST MOD 30 MIN: CPT | Mod: S$GLB,,,

## 2025-06-11 RX ORDER — FLUTICASONE PROPIONATE 50 MCG
2 SPRAY, SUSPENSION (ML) NASAL 2 TIMES DAILY
Qty: 9.9 ML | Refills: 0 | Status: SHIPPED | OUTPATIENT
Start: 2025-06-11 | End: 2025-06-18

## 2025-06-11 RX ORDER — MONTELUKAST SODIUM 10 MG/1
10 TABLET ORAL
COMMUNITY

## 2025-06-11 RX ORDER — MONTELUKAST SODIUM 10 MG/1
10 TABLET ORAL NIGHTLY
Qty: 30 TABLET | Refills: 0 | Status: SHIPPED | OUTPATIENT
Start: 2025-06-11 | End: 2025-07-11

## 2025-06-11 RX ORDER — PREDNISONE 20 MG/1
40 TABLET ORAL DAILY
Qty: 6 TABLET | Refills: 0 | Status: SHIPPED | OUTPATIENT
Start: 2025-06-11 | End: 2025-06-14

## 2025-06-11 NOTE — PROGRESS NOTES
"Subjective:      Patient ID: Radha Tabor is a 50 y.o. female.    Vitals:  height is 5' 8" (1.727 m) and weight is 79.1 kg (174 lb 6.1 oz). Her oral temperature is 97.9 °F (36.6 °C). Her blood pressure is 120/81 and her pulse is 75. Her respiration is 18 and oxygen saturation is 98%.     Chief Complaint: Sore Throat    50 year old female presenting with complaint of sore throat, fatigue, and oral blisters x 3 days. No meds taken.  Patient states she has been traveling for the past 2 weeks, just arrived back in town last night.  She does have chronic seasonal allergies, takes Singulair and Flonase, both of which she is out of, as well as azelastine nasal spray.  She denies any fever, shortness for breath, chest pain, rash, or other complaints.    Mouth Lesions   The current episode started 3 to 5 days ago. The onset was sudden. Associated symptoms include mouth sores and sore throat. Pertinent negatives include no fever, no abdominal pain, no ear pain, no headaches, no muscle aches, no neck pain, no neck stiffness and no cough.     Constitution: Positive for fatigue. Negative for fever and generalized weakness.   HENT:  Positive for mouth sores and sore throat. Negative for ear pain and sinus pain.    Neck: Negative for neck pain.   Cardiovascular:  Negative for chest pain.   Respiratory:  Negative for cough and shortness of breath.    Gastrointestinal:  Negative for abdominal pain.   Allergic/Immunologic: Positive for seasonal allergies.   Neurological:  Negative for headaches.      Objective:     Physical Exam   Constitutional: She is oriented to person, place, and time. She appears well-developed. She is cooperative.  Non-toxic appearance. She does not appear ill. No distress.      Comments:Well-appearing, sitting comfortably in exam chair     HENT:   Head: Normocephalic and atraumatic.   Ears:   Right Ear: Hearing, tympanic membrane, external ear and ear canal normal.   Left Ear: Hearing, tympanic membrane, " external ear and ear canal normal.   Nose: No mucosal edema, rhinorrhea or nasal deformity. No epistaxis. Right sinus exhibits no maxillary sinus tenderness and no frontal sinus tenderness. Left sinus exhibits no maxillary sinus tenderness and no frontal sinus tenderness.   Mouth/Throat: Uvula is midline and mucous membranes are normal. No oral lesions (No lesions on exam). No trismus in the jaw. Normal dentition. No uvula swelling. Posterior oropharyngeal erythema (Mild) present. No oropharyngeal exudate or posterior oropharyngeal edema.   Eyes: Conjunctivae and lids are normal. No scleral icterus.   Neck: Trachea normal and phonation normal. Neck supple. No edema present. No erythema present. No neck rigidity present.   Cardiovascular: Normal rate, regular rhythm, normal heart sounds and normal pulses.   Pulmonary/Chest: Effort normal and breath sounds normal. No respiratory distress. She has no decreased breath sounds. She has no rhonchi.   Clear bilaterally         Comments: Clear bilaterally    Abdominal: Normal appearance.   Musculoskeletal: Normal range of motion.         General: No deformity. Normal range of motion.   Neurological: She is alert and oriented to person, place, and time. She exhibits normal muscle tone. Coordination normal.   Skin: Skin is warm, dry, intact, not diaphoretic and not pale.   Psychiatric: Her speech is normal and behavior is normal. Judgment and thought content normal.   Nursing note and vitals reviewed.      Assessment:     1. Fatigue, unspecified type    2. Seasonal allergies    3. Stomatitis    4. Viral illness    5. Pharyngitis, unspecified etiology        Plan:   Patient does have history of seasonal allergies, however is out of her regularly prescribed medications.  Suspect her symptoms are caused by an exacerbation of allergies to to traveling.  We will refill Singulair and Flonase, we will also give a short course of oral antibiotics for 3 days to help with pharyngitis  and inflammation caused by allergies.  Her COVID testing was negative.  Stomatitis may have been viral in nature, however resolved at this time.  Discussed with patient medication compliance and follow up with PCP, she acknowledges understanding.    Results for orders placed or performed in visit on 06/11/25   SARS Coronavirus 2 Antigen, POCT Manual Read    Collection Time: 06/11/25 11:29 AM   Result Value Ref Range    SARS Coronavirus 2 Antigen Negative Negative, Presumptive Negative     Acceptable Yes          Fatigue, unspecified type  -     SARS Coronavirus 2 Antigen, POCT Manual Read    Seasonal allergies  -     predniSONE (DELTASONE) 20 MG tablet; Take 2 tablets (40 mg total) by mouth once daily. for 3 days  Dispense: 6 tablet; Refill: 0  -     montelukast (SINGULAIR) 10 mg tablet; Take 1 tablet (10 mg total) by mouth every evening.  Dispense: 30 tablet; Refill: 0  -     fluticasone propionate (FLONASE) 50 mcg/actuation nasal spray; 2 sprays (100 mcg total) by Each Nostril route 2 (two) times daily. for 7 days  Dispense: 9.9 mL; Refill: 0    Stomatitis    Viral illness    Pharyngitis, unspecified etiology  -     predniSONE (DELTASONE) 20 MG tablet; Take 2 tablets (40 mg total) by mouth once daily. for 3 days  Dispense: 6 tablet; Refill: 0

## 2025-07-03 ENCOUNTER — HOSPITAL ENCOUNTER (OUTPATIENT)
Dept: RADIOLOGY | Facility: HOSPITAL | Age: 50
Discharge: HOME | End: 2025-07-03
Attending: INTERNAL MEDICINE
Payer: COMMERCIAL

## 2025-07-03 DIAGNOSIS — Z12.31 ENCOUNTER FOR SCREENING MAMMOGRAM FOR BREAST CANCER: ICD-10-CM

## 2025-07-03 PROCEDURE — 77063 BREAST TOMOSYNTHESIS BI: CPT | Mod: TC

## 2025-07-10 ENCOUNTER — PATIENT MESSAGE (OUTPATIENT)
Dept: INTERNAL MEDICINE | Facility: CLINIC | Age: 50
End: 2025-07-10
Payer: COMMERCIAL